# Patient Record
Sex: FEMALE | Race: WHITE | Employment: OTHER | URBAN - NONMETROPOLITAN AREA
[De-identification: names, ages, dates, MRNs, and addresses within clinical notes are randomized per-mention and may not be internally consistent; named-entity substitution may affect disease eponyms.]

---

## 2022-06-09 RX ORDER — LISINOPRIL 20 MG/1
20 TABLET ORAL DAILY
COMMUNITY

## 2022-06-09 RX ORDER — VITAMIN E 268 MG
400 CAPSULE ORAL DAILY
COMMUNITY

## 2022-06-09 RX ORDER — MULTIVIT WITH MINERALS/LUTEIN
1000 TABLET ORAL DAILY
COMMUNITY

## 2022-06-09 RX ORDER — ACETAMINOPHEN 160 MG
TABLET,DISINTEGRATING ORAL DAILY
COMMUNITY

## 2022-06-09 RX ORDER — UREA 10 %
1 LOTION (ML) TOPICAL NIGHTLY PRN
COMMUNITY

## 2022-06-09 RX ORDER — PRASTERONE (DHEA) 50 MG
CAPSULE ORAL EVERY OTHER DAY
COMMUNITY

## 2022-06-09 RX ORDER — DEANOL BITARTRATE 100 %
POWDER (GRAM) MISCELLANEOUS EVERY OTHER DAY
COMMUNITY

## 2022-06-09 RX ORDER — HYDROCHLOROTHIAZIDE 25 MG/1
25 TABLET ORAL DAILY
COMMUNITY

## 2022-06-09 RX ORDER — ALPHA LIPOIC ACID 300 MG
CAPSULE ORAL EVERY OTHER DAY
COMMUNITY

## 2022-06-09 NOTE — PROGRESS NOTES
States she will arrange for someone to be with her after surgery  NPO after midnight  [employer] and drivers license  Wear comfortable clean clothing  Do not bring jewelry  Shower night before and morning of surgery with a liquid antibacterial soap  Bring list of medications with dosage and how often taken  Follow all instructions given by your physician   needed at discharge  Please limit to 2 visitors for surgery  You must have a responsible adult with you day of surgery and for 24 hours after surgery  Please bring and wear mask  Call [phone] for any questions

## 2022-06-09 NOTE — PROGRESS NOTES
In preparation for their surgical procedure above patient was screened for Obstructive Sleep Apnea (GREY) using the STOP-Bang Questionnaire by the Pre-Admission Testing department. This is a pre-surgical screening tool for patient safety and serves as a recommendation, this WILL NOT cause cancellation of surgery. STOP-Bang Questionnaire  * Do you currently see a pulmonologist?  No     If yes STOP, do not complete. Patient follows with Dr.     1.  Do you snore loudly (able to be heard in the next room)? No    2. Do you often feel tired or sleepy during the daytime? No       3. Has anyone ever told you that you stop breathing during your sleep? No    4. Do you have or are you being treated for high blood pressure? Yes      5. BMI more than 35? BMI (Calculated): 26.7        No    6. Age over 48 years? 80 y.o. Yes    7. Neck Circumference greater than 17 inches for male or 16 inches for female? Measured           (visits only)            Not Applicable    8. Gender Male? No      TOTAL SCORE: 2    GREY - Low Risk : Yes to 0 - 2 questions  GREY - Intermediate Risk : Yes to 3 - 4 questions  GREY - High Risk : Yes to 5 - 8 questions    Adapted from:   STOP Questionnaire: A Tool to Screen Patients for Obstructive Sleep Apnea   TRISTAN AquinoC.P.C., Deny Storm M.B.B.S., Leslie Monroy M.D., Matthias Garcia. Katharina Acevedo, Ph.D., MARYANNE Multani.B.B.S., Maryjo Moreno M.Sc., Ignacia Jenkins M.D., Elana Beasley. LAURO Tai.P.C.    Anesthesiology 2008; 659:779-20 Copyright 2008, the [address] of Anesthesi[employer], [facility] 37.   ----------------------------------------------------------------------------------------------------------------

## 2022-06-16 ENCOUNTER — ANESTHESIA (OUTPATIENT)
Dept: OPERATING ROOM | Age: 82
End: 2022-06-16
Payer: MEDICARE

## 2022-06-16 ENCOUNTER — HOSPITAL ENCOUNTER (OUTPATIENT)
Age: 82
Setting detail: OUTPATIENT SURGERY
Discharge: HOME | End: 2022-06-16
Attending: SPECIALIST | Admitting: SPECIALIST
Payer: MEDICARE

## 2022-06-16 ENCOUNTER — ANESTHESIA EVENT (OUTPATIENT)
Dept: OPERATING ROOM | Age: 82
End: 2022-06-16
Payer: MEDICARE

## 2022-06-16 VITALS
BODY MASS INDEX: 25.66 KG/M2 | HEIGHT: 65 IN | SYSTOLIC BLOOD PRESSURE: 115 MMHG | DIASTOLIC BLOOD PRESSURE: 58 MMHG | WEIGHT: 154 LBS | RESPIRATION RATE: 16 BRPM | TEMPERATURE: 96.9 F | HEART RATE: 87 BPM | OXYGEN SATURATION: 95 %

## 2022-06-16 PROCEDURE — 2709999900 HC NON-CHARGEABLE SUPPLY: Performed by: SPECIALIST

## 2022-06-16 PROCEDURE — 2500000003 HC RX 250 WO HCPCS: Performed by: NURSE ANESTHETIST, CERTIFIED REGISTERED

## 2022-06-16 PROCEDURE — 2500000003 HC RX 250 WO HCPCS: Performed by: SPECIALIST

## 2022-06-16 PROCEDURE — 3600000002 HC SURGERY LEVEL 2 BASE: Performed by: SPECIALIST

## 2022-06-16 PROCEDURE — 6360000002 HC RX W HCPCS: Performed by: NURSE ANESTHETIST, CERTIFIED REGISTERED

## 2022-06-16 PROCEDURE — 3700000000 HC ANESTHESIA ATTENDED CARE: Performed by: SPECIALIST

## 2022-06-16 PROCEDURE — 3600000012 HC SURGERY LEVEL 2 ADDTL 15MIN: Performed by: SPECIALIST

## 2022-06-16 PROCEDURE — 7100000010 HC PHASE II RECOVERY - FIRST 15 MIN: Performed by: SPECIALIST

## 2022-06-16 PROCEDURE — 7100000011 HC PHASE II RECOVERY - ADDTL 15 MIN: Performed by: SPECIALIST

## 2022-06-16 PROCEDURE — 3700000001 HC ADD 15 MINUTES (ANESTHESIA): Performed by: SPECIALIST

## 2022-06-16 PROCEDURE — 2580000003 HC RX 258: Performed by: NURSE ANESTHETIST, CERTIFIED REGISTERED

## 2022-06-16 RX ORDER — DEXAMETHASONE SODIUM PHOSPHATE 10 MG/ML
INJECTION, EMULSION INTRAMUSCULAR; INTRAVENOUS PRN
Status: DISCONTINUED | OUTPATIENT
Start: 2022-06-16 | End: 2022-06-16 | Stop reason: SDUPTHER

## 2022-06-16 RX ORDER — LIDOCAINE HYDROCHLORIDE 20 MG/ML
INJECTION, SOLUTION EPIDURAL; INFILTRATION; INTRACAUDAL; PERINEURAL PRN
Status: DISCONTINUED | OUTPATIENT
Start: 2022-06-16 | End: 2022-06-16 | Stop reason: SDUPTHER

## 2022-06-16 RX ORDER — PROPOFOL 10 MG/ML
INJECTION, EMULSION INTRAVENOUS PRN
Status: DISCONTINUED | OUTPATIENT
Start: 2022-06-16 | End: 2022-06-16 | Stop reason: SDUPTHER

## 2022-06-16 RX ORDER — ONDANSETRON 2 MG/ML
INJECTION INTRAMUSCULAR; INTRAVENOUS PRN
Status: DISCONTINUED | OUTPATIENT
Start: 2022-06-16 | End: 2022-06-16 | Stop reason: SDUPTHER

## 2022-06-16 RX ORDER — SODIUM CHLORIDE 9 MG/ML
INJECTION, SOLUTION INTRAVENOUS CONTINUOUS PRN
Status: DISCONTINUED | OUTPATIENT
Start: 2022-06-16 | End: 2022-06-16 | Stop reason: SDUPTHER

## 2022-06-16 RX ORDER — LIDOCAINE HYDROCHLORIDE AND EPINEPHRINE 10; 10 MG/ML; UG/ML
INJECTION, SOLUTION INFILTRATION; PERINEURAL PRN
Status: DISCONTINUED | OUTPATIENT
Start: 2022-06-16 | End: 2022-06-16 | Stop reason: ALTCHOICE

## 2022-06-16 RX ORDER — CEFAZOLIN SODIUM 1 G/3ML
INJECTION, POWDER, FOR SOLUTION INTRAMUSCULAR; INTRAVENOUS PRN
Status: DISCONTINUED | OUTPATIENT
Start: 2022-06-16 | End: 2022-06-16 | Stop reason: SDUPTHER

## 2022-06-16 RX ORDER — FENTANYL CITRATE 50 UG/ML
INJECTION, SOLUTION INTRAMUSCULAR; INTRAVENOUS PRN
Status: DISCONTINUED | OUTPATIENT
Start: 2022-06-16 | End: 2022-06-16 | Stop reason: SDUPTHER

## 2022-06-16 RX ADMIN — PROPOFOL 50 MG: 10 INJECTION, EMULSION INTRAVENOUS at 12:41

## 2022-06-16 RX ADMIN — DEXAMETHASONE SODIUM PHOSPHATE 8 MG: 10 INJECTION, EMULSION INTRAMUSCULAR; INTRAVENOUS at 12:12

## 2022-06-16 RX ADMIN — PROPOFOL 30 MG: 10 INJECTION, EMULSION INTRAVENOUS at 12:25

## 2022-06-16 RX ADMIN — SODIUM CHLORIDE: 9 INJECTION, SOLUTION INTRAVENOUS at 12:08

## 2022-06-16 RX ADMIN — PROPOFOL 30 MG: 10 INJECTION, EMULSION INTRAVENOUS at 12:35

## 2022-06-16 RX ADMIN — CEFAZOLIN 2000 MG: 1 INJECTION, POWDER, FOR SOLUTION INTRAMUSCULAR; INTRAVENOUS at 12:13

## 2022-06-16 RX ADMIN — FENTANYL CITRATE 100 MCG: 50 INJECTION, SOLUTION INTRAMUSCULAR; INTRAVENOUS at 12:10

## 2022-06-16 RX ADMIN — PROPOFOL 30 MG: 10 INJECTION, EMULSION INTRAVENOUS at 12:30

## 2022-06-16 RX ADMIN — PROPOFOL 50 MG: 10 INJECTION, EMULSION INTRAVENOUS at 12:49

## 2022-06-16 RX ADMIN — PROPOFOL 60 MG: 10 INJECTION, EMULSION INTRAVENOUS at 12:53

## 2022-06-16 RX ADMIN — PROPOFOL 50 MG: 10 INJECTION, EMULSION INTRAVENOUS at 12:16

## 2022-06-16 RX ADMIN — LIDOCAINE HYDROCHLORIDE 40 MG: 20 INJECTION, SOLUTION EPIDURAL; INFILTRATION; INTRACAUDAL; PERINEURAL at 12:15

## 2022-06-16 RX ADMIN — PROPOFOL 50 MG: 10 INJECTION, EMULSION INTRAVENOUS at 12:45

## 2022-06-16 RX ADMIN — PROPOFOL 50 MG: 10 INJECTION, EMULSION INTRAVENOUS at 12:58

## 2022-06-16 RX ADMIN — ONDANSETRON 4 MG: 2 INJECTION INTRAMUSCULAR; INTRAVENOUS at 12:21

## 2022-06-16 ASSESSMENT — PAIN - FUNCTIONAL ASSESSMENT: PAIN_FUNCTIONAL_ASSESSMENT: 0-10

## 2022-06-16 NOTE — ANESTHESIA PRE PROCEDURE
Department of Anesthesiology  Preprocedure Note       Name:  Alejandro Call   Age:  80 y.o.  :  1940                                          MRN:  [id number]         Date:  2022      Surgeon: Cornelius Baptiste):  Bailee Torres MD    Procedure: Procedure(s):  MOHS DEFECT REPAIR [facility]    Medications prior to admission:   Prior to Admission medications    Medication Sig Start Date End Date Taking?  Authorizing Provider   lisinopril (PRINIVIL;ZESTRIL) 20 MG tablet Take 20 mg by mouth daily   Yes Historical Provider, MD   hydroCHLOROthiazide (HYDRODIURIL) 25 MG tablet Take 25 mg by mouth daily   Yes Historical Provider, MD   Ascorbic Acid (VITAMIN C) 1000 MG tablet Take 1,000 mg by mouth daily   Yes Historical Provider, MD   vitamin E 400 UNIT capsule Take 400 Units by mouth daily   Yes Historical Provider, MD   Cholecalciferol (VITAMIN D3) 50 MCG ( UT) CAPS Take by mouth daily   Yes Historical Provider, MD   Multiple Vitamin (MULTIVITAMIN PO) Take by mouth daily   Yes Historical Provider, MD   milk thistle 175 MG tablet Take 175 mg by mouth daily   Yes Historical Provider, MD   Misc Natural Products (SARSAPARILLA ROOT PO) Take 450 mg by mouth daily   Yes Historical Provider, MD   Digestive Enzymes (MULTI-ENZYME PO) Take by mouth daily   Yes Historical Provider, MD   Cinnamon 500 MG TABS Take by mouth daily   Yes Historical Provider, MD   Turmeric 450 MG CAPS Take by mouth daily   Yes Historical Provider, MD   Multiple Vitamins-Minerals (LUTEIN-ZEAXANTHIN PO) Take by mouth daily Lutein 10 mg Zeaxanthin 4 mg   Yes Historical Provider, MD   Omega-3 Fatty Acids (OMEGA 3 PO) Take 1,360 mg by mouth 2 times daily   Yes Historical Provider, MD   CALCIUM-MAGNESIUM PO Take by mouth 2 times daily   Yes Historical Provider, MD   ECHINACEA PO Take 1,200 mg by mouth once a week   Yes Historical Provider, MD   melatonin 1 MG tablet Take 1 mg by mouth nightly as needed for Sleep   Yes Historical Provider, MD Carnitine-B5-B6 (L-CARNITINE 500 PO) Take by mouth every other day   Yes Historical Provider, MD   L-ARGININE-500 PO Take by mouth every other day   Yes Historical Provider, MD   Linoleic Acid Conjugated 1000 MG CAPS Take by mouth every other day   Yes Historical Provider, MD   Deanol Bitartrate (DMAE BITARTRATE) POWD Take by mouth every other day   Yes Historical Provider, MD   Alpha-Lipoic Acid 300 MG CAPS Take by mouth every other day   Yes Historical Provider, MD   Lycopene 5 MG CAPS Take by mouth every other day   Yes Historical Provider, MD   Ginkgo Biloba (GINKOBA PO) Take 60 mg by mouth every other day   Yes Historical Provider, MD   Liliya 500 MG CAPS Take by mouth every other day   Yes Historical Provider, MD       Current medications:    No current facility-administered medications for this encounter. Allergies:  No Known Allergies    Problem List:  There is no problem list on file for this patient.       Past Medical History:        Diagnosis Date    Cancer ([facility] [address]     skin    Hypertension        Past Surgical History:        Procedure Laterality Date    APPENDECTOMY      CYST REMOVAL      hip    SKIN CANCER EXCISION         Social History:    Social History     Tobacco Use    Smoking status: Current Every Day Smoker     Packs/day: 0.50     Types: Cigarettes    Smokeless tobacco: Never Used   Substance Use Topics    Alcohol use: Yes     Comment: occasional 1-2 drinks                                Ready to quit: Not Answered  Counseling given: Not Answered      Vital Signs (Current):   Vitals:    06/09/22 1049 06/16/22 1137   BP:  136/60   Pulse:  98   Resp:  16   Temp:  97.4 °F (36.3 °C)   TempSrc:  Temporal   SpO2:  94%   Weight: 155 lb (70.3 kg) 154 lb (69.9 kg)   Height: 5' 4\" (1.626 m) 5' 5\" (1.651 m)                                              BP Readings from Last 3 Encounters:   06/16/22 136/60       NPO Status: Time of last liquid consumption: 0000                        Time of last solid consumption: 2200                        Date of last liquid consumption: 06/16/22                        Date of last solid food consumption: 06/15/22    BMI:   Wt Readings from Last 3 Encounters:   06/16/22 154 lb (69.9 kg)     Body mass index is 25.63 kg/m². CBC: No results found for: WBC, RBC, HGB, HCT, MCV, RDW, PLT    CMP: No results found for: NA, K, CL, CO2, BUN, CREATININE, GFRAA, AGRATIO, LABGLOM, GLUCOSE, GLU, PROT, CALCIUM, BILITOT, ALKPHOS, AST, ALT    POC Tests: No results for input(s): POCGLU, POCNA, POCK, POCCL, POCBUN, POCHEMO, POCHCT in the last 72 hours. Coags: No results found for: PROTIME, INR, APTT    HCG (If Applicable): No results found for: PREGTESTUR, PREGSERUM, HCG, HCGQUANT     ABGs: No results found for: PHART, PO2ART, WOO6FVP, NEZ3NPA, BEART, Q2QJNJTY     Type & Screen (If Applicable):  No results found for: LABABO, LABRH    Drug/Infectious Status (If Applicable):  No results found for: HIV, HEPCAB    COVID-19 Screening (If Applicable): No results found for: COVID19        Anesthesia Evaluation    Airway: Mallampati: II          Dental:          Pulmonary:                              Cardiovascular:    (+) hypertension:,                   Neuro/Psych:               GI/Hepatic/Renal:             Endo/Other:                     Abdominal:             Vascular: Other Findings:           Anesthesia Plan      MAC     ASA 4             Anesthetic plan and risks discussed with patient.                         Calixto Hammonds MD   6/16/2022

## 2022-06-16 NOTE — PROGRESS NOTES
1302 To recovery via chair. Spont resp. VSS. Report received from surgical rn. IV infusing Rosario Coronel. Incision on upper right lip area clean and intact; open to air. Pt denies pain or nausea. Snack and drink given. Call bell in reach. Family member in room  [phone] Discharge instructions given to pt and cousin with each voicing understanding. IV discontinued. Up to dress self  [phone] to go directly to Dr Julia Mayen office to  pain medication prescription.    2847 Discharge to home in stable ambulatory condition with cousin

## 2022-06-16 NOTE — PROGRESS NOTES
Pt. Admitted in stable condition. Consent signed. VS obtained and WNL. INT inserted without complications. Pt. Denies all other needs. Warm blanket provided. Call light left within reach.

## 2022-06-16 NOTE — DISCHARGE INSTRUCTIONS
POST OPERATIVE INSTRUCTION SHEET  SKIN TUMOR/LESION REMOVAL          Activity:        COLD COMPRESSES. 20 minutes on at a time. Do not apply ice directly to the skin  · No strenuous activity for 48 hours  · No activity that stresses the suture closure/incision  · Regular diet; Unless operation of lip- then clear liquids for 48 hours (Sip from a cup: do not use a straw)  · ABSOLUTELY NO NICOTINE OF ANY TYPE    Wound Care:  · If the incision is open to air, you should gently wash with soap and water using fingertips   · If Dermabond used, you may shower 24 hours post operation, but do not scrub, rub or pick at adhesive glue  · Keep all incisions clean. Limitations:  · No swimming, hot tub, sauna or soaking in a bathtub    Prescriptions:  · Take exactly as prescribed    Follow-Up:  Call office for an appointment     Notify our office if you experience any of the following:   Develop a fever (temperature is greater than 100.5F)   Develop redness greater than 1 cm around incision or red streaks up         extremity   Have any excess bleeding/ increased drainage or swelling at the             incision site    *Note:  Your pathology results will be reviewed with you at your scheduled follow-up appointment.

## 2022-06-17 NOTE — H&P
[facility]  History and Physical Update    Pt Name: Gurdeep Maurer  MRN: [id number]  YOB: 1940  Date of evaluation: 6/17/2022    I have examined the patient and reviewed the H&P/Consult and there are no changes to the patient or plans.       Jhonatan Moreno MD  Electronically signed 6/17/2022 at 6:59 AM

## 2022-06-17 NOTE — OP NOTE
Operative Note    Patient name: Gurdeep Maurer             Medical Record Number: [id number]    Primary Care Physician: Natasha Colon     1940    Date of Procedure: 2022    Pre-operative Diagnosis: 3cm2 complex defect of right upper lip s/p MOHS for basal cell carcinoma    Post-operative Diagnosis: Same    Procedure Performed: Repair of 3cm2 complex defect of right upper lip with Cheiloplasty (complex/greater than 1/2 vertical height) (CPT 74314)    Surgeons/Assistants: MD Sameera Fonseca DPMARYANNE    Estimated Blood Loss: 7ml     Complications: none immediately appreciated    Procedure: With the patient lying in the supine position and under adequate anesthesia per the anesthesia team, the area was anesthetized with a total of 7 ml of 1% Lidocaine 1:100,000 with epinephrine solution. The area was then prepped and draped in the standard surgical fashion. The complex 3cm2 defect that involves the right upper lips skin, wet and dry mucosa. This could not be closed primarily, therefore a full thickness resection was performed of the entire height of the lip. The labial artery branches were ligated. The full thickness complex cheiloplasty repair was performed with 4-0 Monocryl suture placed in interrupted buried fashion. The intraoral sutures were 4-0 chromic with final closure was completed using 5-0 fast absorbing & Histoacryl. The patient tolerated the procedure quite well and remained hemodynamically stable throughout the procedure and was quite comfortable throughout the operative course.     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]    Post-Op Diagnosis: Same       Procedure(s):  MOHS DEFECT REPAIR BCC RIGHT VERMILLION    Surgeon(s):  Teresa Ponce MD    Assistant:   * No surgical staff found *    Anesthesia: Monitor Anesthesia Care    Estimated Blood Loss (mL): Minimal    Complications: None    Specimens:   * No specimens in log *    Implants:  * No implants in log *      Drains: * No LDAs found *    Findings: 3cm2 complex defect of right upper lip s/p MOHS for basal cell carcinoma    Detailed Description of Procedure:   Repair of 3cm2 complex defect of right upper lip with Cheiloplasty (complex/greater than 1/2 vertical height) (CPT 30479)    Electronically signed by Teresa Ponce MD on 6/17/2022 at 11:04 AM

## 2022-06-18 NOTE — ANESTHESIA POSTPROCEDURE EVALUATION
Department of Anesthesiology  Postprocedure Note    Patient: Stella Koehler  MRN: [id number]  YOB: 1940  Date of evaluation: 6/18/2022  Time:  12:35 AM     Procedure Summary     Date: 06/16/22 Room / Location: [address] [place] [address] 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 Nazanin Phase I: Nazanin Score: 10    Nazanin Phase II: Nazanin Score: 10    Last vitals: Reviewed and per EMR flowsheets.        Anesthesia Post Evaluation    Complications: no

## 2022-06-20 NOTE — H&P
[address] [address]  History and Physical Update    Pt Name: Florence Hays  MRN: [id number]  YOB: 1940  Date of evaluation: 6/20/2022    I have examined the patient and reviewed the H&P/Consult and there are no changes to the patient or plans.       Aguilar Oscar MD  Electronically signed 6/20/2022 at 6:54 AM

## 2025-03-11 ENCOUNTER — TELEPHONE (OUTPATIENT)
Dept: CARDIOLOGY CLINIC | Age: 85
End: 2025-03-11

## 2025-03-11 NOTE — TELEPHONE ENCOUNTER
Received NP appt request from Dr. Way's office for TAVR workup.   She is currently admitted to [facility] and stated she would like to be discharged and finish her follow up appts and then will CB to make appt with Dr. Rodriguez.   Referral packet attached to encounter.

## 2025-03-14 NOTE — TELEPHONE ENCOUNTER
Spoke to patient, appointment scheduled.   Called Dr Michael's office, requesting full echo report.

## 2025-04-03 ENCOUNTER — TELEPHONE (OUTPATIENT)
Dept: CARDIOLOGY CLINIC | Age: 85
End: 2025-04-03

## 2025-04-03 NOTE — TELEPHONE ENCOUNTER
Patient see's Dr Rodriguez on: 4/10/25    Referring Provider: Mark Waterman MD    Primary Cardiologist: Mark Waterman MD    WSDF send to:     History: CAD, HTN, CHF, severe AS, GIB, Bilateral carotid artery stenosis, COPD, anemia, dysphagia, hypercholesteremia    Cath: *needs performed*    Echo:      Labs: 3/11/25        OAC vs antiplatelet inhibitor:    Do you have trouble swallowing?    Any issues with anesthesia or sedation?    Marlene Ray is being referred for Left Atrial Appendage Closure with WATCHMAN device for management of stroke risk resulting from non-valvular atrial fibrillation. Based on their past history, it has been determined that they are poor candidates for long-term oral-anticoagulation, however may be tolerant of short term treatment with anti-thrombotic therapy as necessary.      Patient is high risk for stroke or systemic thromboembolism. Patient [id number]  is calculated:      Risk   Factors  Component Value   C CHF Yes 1   H HTN Yes 1   A2 Age >= 75 Yes,  (85 y.o.) 2   D DM No 0   S2 Prior Stroke/TIA No 0   V Vascular Disease Yes 1   A Age 65-74 No,  (85 y.o.) 0   Sc Sex female 1    [id number]  Score  6     Vascular: CAD    Specifically regarding risk of anticoagulation they have demonstrated:  History of bleeding (eg. Intracerebral, subdural, GI, retro-peritoneal)  Intolerance oral anticoagulation  Increased bleeding risk (e.g. Thrombocytopenia, anemia)  High risk of recurrent falls  Occupation related high bleeding risk  Need for prolonged dual anti platelet therapy  Severe renal failure  Poor compliance with anticoagulant therapy

## 2025-04-10 ENCOUNTER — TELEPHONE (OUTPATIENT)
Dept: CARDIOLOGY CLINIC | Age: 85
End: 2025-04-10

## 2025-04-10 ENCOUNTER — OFFICE VISIT (OUTPATIENT)
Dept: CARDIOLOGY CLINIC | Age: 85
End: 2025-04-10
Payer: MEDICARE

## 2025-04-10 VITALS
HEIGHT: 64 IN | HEART RATE: 65 BPM | BODY MASS INDEX: 22.2 KG/M2 | SYSTOLIC BLOOD PRESSURE: 144 MMHG | DIASTOLIC BLOOD PRESSURE: 72 MMHG | WEIGHT: 130 LBS

## 2025-04-10 DIAGNOSIS — I25.10 CORONARY ARTERY DISEASE INVOLVING NATIVE CORONARY ARTERY OF NATIVE HEART, UNSPECIFIED WHETHER ANGINA PRESENT: ICD-10-CM

## 2025-04-10 DIAGNOSIS — I65.23 BILATERAL CAROTID ARTERY STENOSIS: ICD-10-CM

## 2025-04-10 DIAGNOSIS — I35.0 SEVERE AORTIC STENOSIS: Primary | ICD-10-CM

## 2025-04-10 DIAGNOSIS — I48.0 PAROXYSMAL ATRIAL FIBRILLATION (HCC): ICD-10-CM

## 2025-04-10 PROCEDURE — 99205 OFFICE O/P NEW HI 60 MIN: CPT | Performed by: INTERNAL MEDICINE

## 2025-04-10 PROCEDURE — 1159F MED LIST DOCD IN RCRD: CPT | Performed by: INTERNAL MEDICINE

## 2025-04-10 PROCEDURE — 1123F ACP DISCUSS/DSCN MKR DOCD: CPT | Performed by: INTERNAL MEDICINE

## 2025-04-10 RX ORDER — ASPIRIN 81 MG/1
81 TABLET, CHEWABLE ORAL DAILY
COMMUNITY
Start: 2025-03-06

## 2025-04-10 RX ORDER — LACTOBACILLUS ACIDOPHILUS 500MM CELL
1 CAPSULE ORAL DAILY
COMMUNITY
Start: 2025-03-11 | End: 2025-03-25

## 2025-04-10 RX ORDER — MAGNESIUM GLYCINATE 100 MG
2 CAPSULE ORAL 2 TIMES DAILY
COMMUNITY
Start: 2025-03-13

## 2025-04-10 RX ORDER — METOPROLOL SUCCINATE 50 MG/1
50 TABLET, EXTENDED RELEASE ORAL DAILY
COMMUNITY
Start: 2025-03-06 | End: 2025-04-11

## 2025-04-10 RX ORDER — POTASSIUM CITRATE 1080 MG/1
TABLET, EXTENDED RELEASE ORAL
COMMUNITY

## 2025-04-10 RX ORDER — AMLODIPINE BESYLATE 2.5 MG/1
2.5 TABLET ORAL DAILY
COMMUNITY

## 2025-04-10 RX ORDER — FERROUS SULFATE 325(65) MG
325 TABLET ORAL DAILY
COMMUNITY
Start: 2025-03-27

## 2025-04-10 RX ORDER — FUROSEMIDE 40 MG/1
40 TABLET ORAL DAILY
COMMUNITY

## 2025-04-10 RX ORDER — NICOTINE 21 MG/24HR
1 PATCH, TRANSDERMAL 24 HOURS TRANSDERMAL DAILY
Qty: 42 PATCH | Refills: 0 | Status: SHIPPED | OUTPATIENT
Start: 2025-04-10 | End: 2025-05-22

## 2025-04-10 RX ORDER — ATORVASTATIN CALCIUM 80 MG/1
80 TABLET, FILM COATED ORAL EVERY EVENING
COMMUNITY
Start: 2025-03-06

## 2025-04-10 NOTE — PROGRESS NOTES
and Watchman procedures with the patient.       Discussed symptoms needing emergency care or those which require further medical attention.   Discussed diet/exercise/BP/weight loss/health lifestyle choices/lipids; the patient understands the goals and will try to comply.        Disposition:  TAVR then WATCHMAN             The patient (or guardian, if applicable) and other individuals in attendance with the patient were advised that Artificial Intelligence will be utilized during this visit to record, process the conversation to generate a clinical note, and support improvement of the AI technology. The patient (or guardian, if applicable) and other individuals in attendance at the appointment consented to the use of AI, including the recording.      Electronically signed by Raffi Rodriguez MD   4/10/2025 at 8:12 AM EDT

## 2025-04-10 NOTE — TELEPHONE ENCOUNTER
Orders received from Dr. Rodriguez for CBC and BMP.    Orders received from Dr. Rodriguez to obtain a CV/CT Surgery appointment, CTAs of the neck, chest and abdomen and pelvis with TAVr protocol, Cardiac Catheterization, Dental Clearance and CHF appointment for optimization.    CT/CV Surgery appointment scheduled on 4/15/25  CTA scheduled on ___ at ___.   Cardiac Catheterization scheduled with Dr. Rodriguez on ___ at ___ with an arrival time of ___.    Patient has had cath back in 2022.  Per Dr. Rodriguez image review shows multivessel CAD, pt will likely need staged PCI.  Will schedule R/L HC as first case on Monday interventional week.  Will schedule when cleared by dentist.      CHF appointment scheduled on ___ at ___  Dental Appointment scheduled on 4/15 with Dr[facility] Lu at [facility].  Dental clearance has been faxed to his office.     PreTAVR [id number] completed and scanned into chart.     Labs pending.    Dr. Rodriguez, please agree.     [place], can you please schedule CTAs?

## 2025-04-10 NOTE — PATIENT INSTRUCTIONS
Your staff today were Sharona  Your provider today was Dr. Rodriguez  Phone number: [phone]      You may receive a survey regarding the care you received during your visit.  Your input is valuable to us.  We encourage you to complete and return your survey.  We hope you will choose us in the future for your healthcare needs.

## 2025-04-15 ENCOUNTER — OFFICE VISIT (OUTPATIENT)
Dept: CARDIOTHORACIC SURGERY | Age: 85
End: 2025-04-15
Payer: MEDICARE

## 2025-04-15 VITALS
HEIGHT: 64 IN | HEART RATE: 68 BPM | BODY MASS INDEX: 22.02 KG/M2 | SYSTOLIC BLOOD PRESSURE: 133 MMHG | DIASTOLIC BLOOD PRESSURE: 69 MMHG | WEIGHT: 129 LBS | OXYGEN SATURATION: 98 %

## 2025-04-15 DIAGNOSIS — I35.0 AORTIC VALVE STENOSIS, ETIOLOGY OF CARDIAC VALVE DISEASE UNSPECIFIED: Primary | ICD-10-CM

## 2025-04-15 PROCEDURE — 1159F MED LIST DOCD IN RCRD: CPT | Performed by: PHYSICIAN ASSISTANT

## 2025-04-15 PROCEDURE — 1123F ACP DISCUSS/DSCN MKR DOCD: CPT | Performed by: PHYSICIAN ASSISTANT

## 2025-04-15 PROCEDURE — 99205 OFFICE O/P NEW HI 60 MIN: CPT | Performed by: PHYSICIAN ASSISTANT

## 2025-04-15 PROCEDURE — 1160F RVW MEDS BY RX/DR IN RCRD: CPT | Performed by: PHYSICIAN ASSISTANT

## 2025-04-15 NOTE — TELEPHONE ENCOUNTER
Dental clearance obtained and scanned into chart.     [place], can you please schedule R/L HC with Dr. Rodriguez on 5/12?  Scheduled already blocked.    Pt will need a CHF appt to follow scheduled as well.

## 2025-04-15 NOTE — PROGRESS NOTES
NEW PATIENT OFFICE VISIT CT/CV SURGERY    Patient's Name/Date of Birth: Marlene Ray / 1940 (85 y.o.)    PCP: Emanuel Michael DO    Date: April 15, 2025     CC:     Chief Complaint   Patient presents with    Consultation     TAVR Eval     HPI:    Ms. Ray is a 85 year old with hx of severe AS who presents to office with complaints of fatigue and WHITNEY with mild exertion for the past several months with increased progression over the past few weeks.  She recently saw Dr. Rodriguez for her leaky valve and was recommended for TAVR. She has recently experienced extreme fatigue and one instance of syncope. She had a hospitalization in 3/25 for fluid overload and swelling in legs and hands. She has a history of cigarette use, smoking 4 cigarettes daily. She's also had prior hospitalization for GI bleed.     Vital Signs: /69   Pulse 68   Ht 1.626 m (5' 4\")   Wt 58.5 kg (129 lb)   SpO2 98%   BMI 22.14 kg/m²    No data recorded.    Labs:   Labs: 3/11/25         Imaging:  [facility] 9/13/22:  - multivessel CAD  = LMCA mid-segment 50% stenosis  = LAD ostial 50% stenosis, mid-segment 70%, small caliber distally  = D1 ostial 90% stenosis   = LCX proximal 75% stenosis  = RCA ostial 80-90% stenosis  - moderate aortic valve stenosis    Echo:          Expand All Collapse All          [facility]  [facility]  [address] [place] [address]  Dept: [phone]  Dept Fax: [phone]  Loc: [phone]     Visit Date: 4/10/2025     Ms. Ray is a 85 y.o. female  who presented for:      Chief Complaint   Patient presents with    New Patient    Valvular Heart Disease         HPI:      History of Present Illness  The patient is an 85-year-old female who presents for a leaky valve. She is accompanied by her daughter.     A diseased valve has been diagnosed and monitored for several years. Recently, extreme fatigue and one instance of syncope were experienced.

## 2025-04-15 NOTE — TELEPHONE ENCOUNTER
L/R Heart Cath scheduled 5/12/2025 at 9:00 am.  Arrival time of 7:00 am.  Spoke to Rema.  Placed on provider's schedule.  Order faxed.

## 2025-04-15 NOTE — PATIENT INSTRUCTIONS
You may receive a survey regarding the care you received during your visit.  Your input is valuable to us.  We encourage you to complete and return your survey.  We hope you will choose us in the future for your healthcare needs.    Thank you for choosing [facility]!    Your Medical Assistant Today:  Lety JOINER   Your Provider for Today: Edward Cline PA-C   Your Structural Heart RN: Lala BELLO  Ph. [phone]    Have a Great Day!  Happy Easter!

## 2025-04-15 NOTE — TELEPHONE ENCOUNTER
Patient called back.  Reviewed date/time/instructions.  Advised her I will mail a copy of instructions to her and email a copy to her daughter.

## 2025-04-16 PROBLEM — I35.0 SEVERE AORTIC STENOSIS: Status: ACTIVE | Noted: 2025-04-10

## 2025-04-28 ENCOUNTER — HOSPITAL ENCOUNTER (OUTPATIENT)
Dept: CT IMAGING | Age: 85
Discharge: HOME | End: 2025-04-28
Payer: MEDICARE

## 2025-04-28 DIAGNOSIS — I35.0 SEVERE AORTIC STENOSIS: ICD-10-CM

## 2025-04-28 PROCEDURE — 70498 CT ANGIOGRAPHY NECK: CPT

## 2025-04-28 PROCEDURE — 74174 CTA ABD&PLVS W/CONTRAST: CPT

## 2025-04-28 PROCEDURE — 71275 CT ANGIOGRAPHY CHEST: CPT

## 2025-04-28 PROCEDURE — 6360000004 HC RX CONTRAST MEDICATION: Performed by: INTERNAL MEDICINE

## 2025-04-28 RX ORDER — IOPAMIDOL 755 MG/ML
125 INJECTION, SOLUTION INTRAVASCULAR
Status: COMPLETED | OUTPATIENT
Start: 2025-04-28 | End: 2025-04-28

## 2025-04-28 RX ADMIN — IOPAMIDOL 125 ML: 755 INJECTION, SOLUTION INTRAVENOUS at 12:55

## 2025-04-29 ENCOUNTER — TELEPHONE (OUTPATIENT)
Dept: CARDIOLOGY CLINIC | Age: 85
End: 2025-04-29

## 2025-05-05 NOTE — TELEPHONE ENCOUNTER
Patient has been seen by [facility] in the past.  Medical records requested 4/29 and received today.  Spoke with patient, she has not been seen by [facility] since 2022, last carotid US 2018.  She is agreeable to referral to [facility] for evaluation of bilateral carotid artery stenosis prior to TAVR.     Referral placed and faxed to [facility].

## 2025-05-09 ENCOUNTER — PREP FOR PROCEDURE (OUTPATIENT)
Dept: CARDIOLOGY | Age: 85
End: 2025-05-09

## 2025-05-09 RX ORDER — SODIUM CHLORIDE 9 MG/ML
INJECTION, SOLUTION INTRAVENOUS CONTINUOUS
Status: CANCELLED | OUTPATIENT
Start: 2025-05-09

## 2025-05-09 RX ORDER — ASPIRIN 325 MG
325 TABLET ORAL ONCE
Status: CANCELLED | OUTPATIENT
Start: 2025-05-09 | End: 2025-05-09

## 2025-05-09 RX ORDER — DIPHENHYDRAMINE HYDROCHLORIDE 50 MG/ML
50 INJECTION, SOLUTION INTRAMUSCULAR; INTRAVENOUS ONCE
Status: CANCELLED | OUTPATIENT
Start: 2025-05-09 | End: 2025-05-09

## 2025-05-09 RX ORDER — HYDROCORTISONE SODIUM SUCCINATE 100 MG/2ML
200 INJECTION INTRAMUSCULAR; INTRAVENOUS ONCE
Status: CANCELLED | OUTPATIENT
Start: 2025-05-09 | End: 2025-05-09

## 2025-05-09 RX ORDER — SODIUM CHLORIDE 0.9 % (FLUSH) 0.9 %
5-40 SYRINGE (ML) INJECTION PRN
Status: CANCELLED | OUTPATIENT
Start: 2025-05-09

## 2025-05-09 RX ORDER — SODIUM CHLORIDE 0.9 % (FLUSH) 0.9 %
5-40 SYRINGE (ML) INJECTION EVERY 12 HOURS SCHEDULED
Status: CANCELLED | OUTPATIENT
Start: 2025-05-09

## 2025-05-09 RX ORDER — NITROGLYCERIN 0.4 MG/1
0.4 TABLET SUBLINGUAL EVERY 5 MIN PRN
Status: CANCELLED | OUTPATIENT
Start: 2025-05-09

## 2025-05-12 ENCOUNTER — HOSPITAL ENCOUNTER (OUTPATIENT)
Age: 85
Discharge: HOME | End: 2025-05-13
Attending: INTERNAL MEDICINE | Admitting: INTERNAL MEDICINE
Payer: MEDICARE

## 2025-05-12 ENCOUNTER — TELEPHONE (OUTPATIENT)
Dept: CARDIOLOGY CLINIC | Age: 85
End: 2025-05-12

## 2025-05-12 DIAGNOSIS — I35.0 SEVERE AORTIC STENOSIS: ICD-10-CM

## 2025-05-12 PROBLEM — Z95.820 STATUS POST ANGIOPLASTY WITH STENT: Status: ACTIVE | Noted: 2025-05-12

## 2025-05-12 LAB
ABO GROUP BLD: NORMAL
ACTIVATED CLOTTING TIME: 233 SECONDS (ref 1–150)
ANION GAP SERPL CALC-SCNC: 12 MEQ/L (ref 8–16)
APTT PPP: 31.5 SECONDS (ref 22–38)
BDY SITE: ABNORMAL
BUN SERPL-MCNC: 22 MG/DL (ref 8–23)
CALCIUM SERPL-MCNC: 9.4 MG/DL (ref 8.8–10.2)
CHLORIDE SERPL-SCNC: 102 MEQ/L (ref 98–111)
CO2 SERPL-SCNC: 24 MEQ/L (ref 22–29)
COLLECTED BY:: ABNORMAL
CREAT SERPL-MCNC: 1 MG/DL (ref 0.5–0.9)
DEPRECATED RDW RBC AUTO: 59.5 FL (ref 35–45)
ECHO BSA: 1.63 M2
EKG ATRIAL RATE: 60 BPM
EKG ATRIAL RATE: 68 BPM
EKG P AXIS: 54 DEGREES
EKG P AXIS: 73 DEGREES
EKG P-R INTERVAL: 166 MS
EKG P-R INTERVAL: 184 MS
EKG Q-T INTERVAL: 392 MS
EKG Q-T INTERVAL: 410 MS
EKG QRS DURATION: 76 MS
EKG QRS DURATION: 86 MS
EKG QTC CALCULATION (BAZETT): 410 MS
EKG QTC CALCULATION (BAZETT): 416 MS
EKG R AXIS: -11 DEGREES
EKG R AXIS: -9 DEGREES
EKG T AXIS: 47 DEGREES
EKG T AXIS: 57 DEGREES
EKG VENTRICULAR RATE: 60 BPM
EKG VENTRICULAR RATE: 68 BPM
ERYTHROCYTE [DISTWIDTH] IN BLOOD BY AUTOMATED COUNT: 17.2 % (ref 11.5–14.5)
GFR SERPL CREATININE-BSD FRML MDRD: 55 ML/MIN/1.73M2
GLUCOSE SERPL-MCNC: 115 MG/DL (ref 74–109)
HCT VFR BLD AUTO: 37.9 % (ref 37–47)
HGB BLD-MCNC: 12.4 GM/DL (ref 12–16)
IAT IGG-SP REAG SERPL QL: NORMAL
INR PPP: 0.95 (ref 0.85–1.13)
MCH RBC QN AUTO: 30.7 PG (ref 26–33)
MCHC RBC AUTO-ENTMCNC: 32.7 GM/DL (ref 32.2–35.5)
MCV RBC AUTO: 93.8 FL (ref 81–99)
PLATELET # BLD AUTO: 253 THOU/MM3 (ref 130–400)
PMV BLD AUTO: 9.9 FL (ref 9.4–12.4)
POTASSIUM SERPL-SCNC: 4.1 MEQ/L (ref 3.5–5.2)
RBC # BLD AUTO: 4.04 MILL/MM3 (ref 4.2–5.4)
RH BLD: NORMAL
SAO2 % BLD: 68 % (ref 94–97)
SAO2 % BLD: 91 % (ref 94–97)
SAO2 % BLD: 92 % (ref 94–97)
SODIUM SERPL-SCNC: 138 MEQ/L (ref 135–145)
WBC # BLD AUTO: 7.8 THOU/MM3 (ref 4.8–10.8)

## 2025-05-12 PROCEDURE — C1874 STENT, COATED/COV W/DEL SYS: HCPCS | Performed by: INTERNAL MEDICINE

## 2025-05-12 PROCEDURE — C1760 CLOSURE DEV, VASC: HCPCS | Performed by: INTERNAL MEDICINE

## 2025-05-12 PROCEDURE — 93005 ELECTROCARDIOGRAM TRACING: CPT | Performed by: INTERNAL MEDICINE

## 2025-05-12 PROCEDURE — 82810 BLOOD GASES O2 SAT ONLY: CPT

## 2025-05-12 PROCEDURE — 92972 PERQ TRLUML CORONRY LITHOTRP: CPT | Performed by: INTERNAL MEDICINE

## 2025-05-12 PROCEDURE — 85610 PROTHROMBIN TIME: CPT

## 2025-05-12 PROCEDURE — 93454 CORONARY ARTERY ANGIO S&I: CPT | Performed by: INTERNAL MEDICINE

## 2025-05-12 PROCEDURE — 6360000004 HC RX CONTRAST MEDICATION: Performed by: INTERNAL MEDICINE

## 2025-05-12 PROCEDURE — 80048 BASIC METABOLIC PNL TOTAL CA: CPT

## 2025-05-12 PROCEDURE — 99152 MOD SED SAME PHYS/QHP 5/>YRS: CPT | Performed by: INTERNAL MEDICINE

## 2025-05-12 PROCEDURE — 99153 MOD SED SAME PHYS/QHP EA: CPT | Performed by: INTERNAL MEDICINE

## 2025-05-12 PROCEDURE — C9600 PERC DRUG-EL COR STENT SING: HCPCS | Performed by: INTERNAL MEDICINE

## 2025-05-12 PROCEDURE — 36415 COLL VENOUS BLD VENIPUNCTURE: CPT

## 2025-05-12 PROCEDURE — 6360000002 HC RX W HCPCS: Performed by: INTERNAL MEDICINE

## 2025-05-12 PROCEDURE — 2580000003 HC RX 258: Performed by: STUDENT IN AN ORGANIZED HEALTH CARE EDUCATION/TRAINING PROGRAM

## 2025-05-12 PROCEDURE — 7100000010 HC PHASE II RECOVERY - FIRST 15 MIN: Performed by: INTERNAL MEDICINE

## 2025-05-12 PROCEDURE — 6370000000 HC RX 637 (ALT 250 FOR IP): Performed by: INTERNAL MEDICINE

## 2025-05-12 PROCEDURE — C1894 INTRO/SHEATH, NON-LASER: HCPCS | Performed by: INTERNAL MEDICINE

## 2025-05-12 PROCEDURE — C1761 HC CATH TRANSLUM INTRAVASCULAR LITHOTRIPSY CORONARY: HCPCS | Performed by: INTERNAL MEDICINE

## 2025-05-12 PROCEDURE — 7100000011 HC PHASE II RECOVERY - ADDTL 15 MIN: Performed by: INTERNAL MEDICINE

## 2025-05-12 PROCEDURE — C1725 CATH, TRANSLUMIN NON-LASER: HCPCS | Performed by: INTERNAL MEDICINE

## 2025-05-12 PROCEDURE — 93010 ELECTROCARDIOGRAM REPORT: CPT | Performed by: INTERNAL MEDICINE

## 2025-05-12 PROCEDURE — 85347 COAGULATION TIME ACTIVATED: CPT

## 2025-05-12 PROCEDURE — 92928 PRQ TCAT PLMT NTRAC ST 1 LES: CPT | Performed by: INTERNAL MEDICINE

## 2025-05-12 PROCEDURE — G0269 OCCLUSIVE DEVICE IN VEIN ART: HCPCS | Performed by: INTERNAL MEDICINE

## 2025-05-12 PROCEDURE — 86901 BLOOD TYPING SEROLOGIC RH(D): CPT

## 2025-05-12 PROCEDURE — 2709999900 HC NON-CHARGEABLE SUPPLY: Performed by: INTERNAL MEDICINE

## 2025-05-12 PROCEDURE — 86900 BLOOD TYPING SEROLOGIC ABO: CPT

## 2025-05-12 PROCEDURE — C1887 CATHETER, GUIDING: HCPCS | Performed by: INTERNAL MEDICINE

## 2025-05-12 PROCEDURE — 2500000003 HC RX 250 WO HCPCS: Performed by: INTERNAL MEDICINE

## 2025-05-12 PROCEDURE — 85730 THROMBOPLASTIN TIME PARTIAL: CPT

## 2025-05-12 PROCEDURE — 85027 COMPLETE CBC AUTOMATED: CPT

## 2025-05-12 PROCEDURE — C1769 GUIDE WIRE: HCPCS | Performed by: INTERNAL MEDICINE

## 2025-05-12 PROCEDURE — 93005 ELECTROCARDIOGRAM TRACING: CPT | Performed by: STUDENT IN AN ORGANIZED HEALTH CARE EDUCATION/TRAINING PROGRAM

## 2025-05-12 PROCEDURE — 93456 R HRT CORONARY ARTERY ANGIO: CPT | Performed by: INTERNAL MEDICINE

## 2025-05-12 PROCEDURE — 86885 COOMBS TEST INDIRECT QUAL: CPT

## 2025-05-12 DEVICE — STENT ONYXNG35018UX ONYX 3.50X18RX
Type: IMPLANTABLE DEVICE | Status: FUNCTIONAL
Brand: ONYX FRONTIER™

## 2025-05-12 RX ORDER — ATORVASTATIN CALCIUM 80 MG/1
80 TABLET, FILM COATED ORAL NIGHTLY
Status: DISCONTINUED | OUTPATIENT
Start: 2025-05-12 | End: 2025-05-13 | Stop reason: HOSPADM

## 2025-05-12 RX ORDER — SODIUM CHLORIDE 0.9 % (FLUSH) 0.9 %
5-40 SYRINGE (ML) INJECTION EVERY 12 HOURS SCHEDULED
Status: DISCONTINUED | OUTPATIENT
Start: 2025-05-12 | End: 2025-05-13 | Stop reason: HOSPADM

## 2025-05-12 RX ORDER — AMLODIPINE BESYLATE 2.5 MG/1
2.5 TABLET ORAL DAILY
Status: DISCONTINUED | OUTPATIENT
Start: 2025-05-12 | End: 2025-05-13 | Stop reason: HOSPADM

## 2025-05-12 RX ORDER — ASPIRIN 81 MG/1
81 TABLET, CHEWABLE ORAL DAILY
Status: DISCONTINUED | OUTPATIENT
Start: 2025-05-13 | End: 2025-05-13 | Stop reason: HOSPADM

## 2025-05-12 RX ORDER — CLOPIDOGREL BISULFATE 75 MG/1
75 TABLET ORAL DAILY
Status: DISCONTINUED | OUTPATIENT
Start: 2025-05-13 | End: 2025-05-13 | Stop reason: HOSPADM

## 2025-05-12 RX ORDER — SODIUM CHLORIDE 0.9 % (FLUSH) 0.9 %
5-40 SYRINGE (ML) INJECTION PRN
Status: DISCONTINUED | OUTPATIENT
Start: 2025-05-12 | End: 2025-05-12 | Stop reason: HOSPADM

## 2025-05-12 RX ORDER — SODIUM CHLORIDE 0.9 % (FLUSH) 0.9 %
5-40 SYRINGE (ML) INJECTION PRN
Status: DISCONTINUED | OUTPATIENT
Start: 2025-05-12 | End: 2025-05-13 | Stop reason: HOSPADM

## 2025-05-12 RX ORDER — SODIUM CHLORIDE 9 MG/ML
INJECTION, SOLUTION INTRAVENOUS CONTINUOUS
Status: DISCONTINUED | OUTPATIENT
Start: 2025-05-12 | End: 2025-05-12 | Stop reason: HOSPADM

## 2025-05-12 RX ORDER — HEPARIN SODIUM 1000 [USP'U]/ML
INJECTION, SOLUTION INTRAVENOUS; SUBCUTANEOUS PRN
Status: DISCONTINUED | OUTPATIENT
Start: 2025-05-12 | End: 2025-05-12 | Stop reason: HOSPADM

## 2025-05-12 RX ORDER — LISINOPRIL 20 MG/1
20 TABLET ORAL DAILY
Status: DISCONTINUED | OUTPATIENT
Start: 2025-05-12 | End: 2025-05-13 | Stop reason: HOSPADM

## 2025-05-12 RX ORDER — HYDROCORTISONE SODIUM SUCCINATE 100 MG/2ML
200 INJECTION INTRAMUSCULAR; INTRAVENOUS ONCE
Status: DISCONTINUED | OUTPATIENT
Start: 2025-05-12 | End: 2025-05-12

## 2025-05-12 RX ORDER — DIPHENHYDRAMINE HYDROCHLORIDE 50 MG/ML
50 INJECTION, SOLUTION INTRAMUSCULAR; INTRAVENOUS ONCE
Status: DISCONTINUED | OUTPATIENT
Start: 2025-05-12 | End: 2025-05-12

## 2025-05-12 RX ORDER — ACETAMINOPHEN 325 MG/1
650 TABLET ORAL EVERY 4 HOURS PRN
Status: DISCONTINUED | OUTPATIENT
Start: 2025-05-12 | End: 2025-05-13 | Stop reason: HOSPADM

## 2025-05-12 RX ORDER — ATORVASTATIN CALCIUM 80 MG/1
80 TABLET, FILM COATED ORAL EVERY EVENING
Status: DISCONTINUED | OUTPATIENT
Start: 2025-05-12 | End: 2025-05-12 | Stop reason: SDUPTHER

## 2025-05-12 RX ORDER — VITAMIN E 268 MG
400 CAPSULE ORAL DAILY
Status: DISCONTINUED | OUTPATIENT
Start: 2025-05-12 | End: 2025-05-13 | Stop reason: HOSPADM

## 2025-05-12 RX ORDER — FUROSEMIDE 20 MG/1
20 TABLET ORAL 2 TIMES DAILY
Status: DISCONTINUED | OUTPATIENT
Start: 2025-05-12 | End: 2025-05-13 | Stop reason: HOSPADM

## 2025-05-12 RX ORDER — METOPROLOL SUCCINATE 50 MG/1
50 TABLET, EXTENDED RELEASE ORAL DAILY
Status: DISCONTINUED | OUTPATIENT
Start: 2025-05-12 | End: 2025-05-13 | Stop reason: HOSPADM

## 2025-05-12 RX ORDER — ASPIRIN 325 MG
325 TABLET ORAL ONCE
Status: DISCONTINUED | OUTPATIENT
Start: 2025-05-12 | End: 2025-05-12

## 2025-05-12 RX ORDER — MAGNESIUM GLYCINATE 100 MG
2 CAPSULE ORAL 2 TIMES DAILY
Status: DISCONTINUED | OUTPATIENT
Start: 2025-05-12 | End: 2025-05-12 | Stop reason: RX

## 2025-05-12 RX ORDER — NICOTINE 21 MG/24HR
1 PATCH, TRANSDERMAL 24 HOURS TRANSDERMAL DAILY
Status: DISCONTINUED | OUTPATIENT
Start: 2025-05-12 | End: 2025-05-13 | Stop reason: HOSPADM

## 2025-05-12 RX ORDER — ASCORBIC ACID 500 MG
1000 TABLET ORAL DAILY
Status: DISCONTINUED | OUTPATIENT
Start: 2025-05-12 | End: 2025-05-13 | Stop reason: HOSPADM

## 2025-05-12 RX ORDER — FENTANYL CITRATE 50 UG/ML
INJECTION, SOLUTION INTRAMUSCULAR; INTRAVENOUS PRN
Status: DISCONTINUED | OUTPATIENT
Start: 2025-05-12 | End: 2025-05-12 | Stop reason: HOSPADM

## 2025-05-12 RX ORDER — POTASSIUM CITRATE 1080 MG/1
10 TABLET, EXTENDED RELEASE ORAL 2 TIMES DAILY
Status: DISCONTINUED | OUTPATIENT
Start: 2025-05-12 | End: 2025-05-13 | Stop reason: HOSPADM

## 2025-05-12 RX ORDER — FERROUS SULFATE 325(65) MG
325 TABLET ORAL DAILY
Status: DISCONTINUED | OUTPATIENT
Start: 2025-05-12 | End: 2025-05-13 | Stop reason: HOSPADM

## 2025-05-12 RX ORDER — SODIUM CHLORIDE 0.9 % (FLUSH) 0.9 %
5-40 SYRINGE (ML) INJECTION EVERY 12 HOURS SCHEDULED
Status: DISCONTINUED | OUTPATIENT
Start: 2025-05-12 | End: 2025-05-12 | Stop reason: HOSPADM

## 2025-05-12 RX ORDER — IOPAMIDOL 755 MG/ML
INJECTION, SOLUTION INTRAVASCULAR PRN
Status: DISCONTINUED | OUTPATIENT
Start: 2025-05-12 | End: 2025-05-12 | Stop reason: HOSPADM

## 2025-05-12 RX ORDER — NITROGLYCERIN 0.4 MG/1
0.4 TABLET SUBLINGUAL EVERY 5 MIN PRN
Status: DISCONTINUED | OUTPATIENT
Start: 2025-05-12 | End: 2025-05-12 | Stop reason: HOSPADM

## 2025-05-12 RX ORDER — CLOPIDOGREL 300 MG/1
TABLET, FILM COATED ORAL PRN
Status: DISCONTINUED | OUTPATIENT
Start: 2025-05-12 | End: 2025-05-12 | Stop reason: HOSPADM

## 2025-05-12 RX ORDER — SODIUM CHLORIDE 9 MG/ML
INJECTION, SOLUTION INTRAVENOUS PRN
Status: DISCONTINUED | OUTPATIENT
Start: 2025-05-12 | End: 2025-05-13 | Stop reason: HOSPADM

## 2025-05-12 RX ORDER — MIDAZOLAM HYDROCHLORIDE 1 MG/ML
INJECTION, SOLUTION INTRAMUSCULAR; INTRAVENOUS PRN
Status: DISCONTINUED | OUTPATIENT
Start: 2025-05-12 | End: 2025-05-12 | Stop reason: HOSPADM

## 2025-05-12 RX ADMIN — ATORVASTATIN CALCIUM 80 MG: 80 TABLET, FILM COATED ORAL at 20:01

## 2025-05-12 RX ADMIN — SODIUM CHLORIDE, PRESERVATIVE FREE 10 ML: 5 INJECTION INTRAVENOUS at 20:02

## 2025-05-12 RX ADMIN — POTASSIUM CITRATE 10 MEQ: 10 TABLET ORAL at 20:01

## 2025-05-12 RX ADMIN — LISINOPRIL 20 MG: 20 TABLET ORAL at 13:20

## 2025-05-12 RX ADMIN — Medication 3 MG: at 20:01

## 2025-05-12 RX ADMIN — AMLODIPINE BESYLATE 2.5 MG: 2.5 TABLET ORAL at 13:20

## 2025-05-12 RX ADMIN — SODIUM CHLORIDE: 0.9 INJECTION, SOLUTION INTRAVENOUS at 07:44

## 2025-05-12 ASSESSMENT — PAIN SCALES - GENERAL: PAINLEVEL_OUTOF10: 0

## 2025-05-12 NOTE — PROGRESS NOTES
1100 Care taken over from cath lab, right groin stable . Patient reinstructed on bedrest, instructed to keep legs straight, not to cross legs, not to lift head off of pillow, not to laugh hard, if coughs to guard site with hand, voices understanding.

## 2025-05-12 NOTE — PLAN OF CARE
Problem: Cardiovascular - Adult  Goal: Maintains optimal cardiac output and hemodynamic stability  5/12/2025 1434 by Karen Adhikari RN  Outcome: Progressing  5/12/2025 0953 by Debbie Carias RN  Outcome: Progressing  Flowsheets (Taken 5/12/2025 0720)  Maintains optimal cardiac output and hemodynamic stability:   Monitor blood pressure and heart rate   Monitor urine output and notify Licensed Independent Practitioner for values outside of normal range     Problem: Discharge Planning  Goal: Discharge to home or other facility with appropriate resources  5/12/2025 1434 by Karen Adhikari RN  Outcome: Progressing  5/12/2025 0953 by Debbie Carias RN  Outcome: Progressing  Flowsheets (Taken 5/12/2025 0720)  Discharge to home or other facility with appropriate resources:   Identify barriers to discharge with patient and caregiver   Arrange for needed discharge resources and transportation as appropriate     Problem: Pain  Goal: Verbalizes/displays adequate comfort level or baseline comfort level  5/12/2025 1434 by Karen Adhikari RN  Outcome: Progressing  5/12/2025 0953 by Debbie Carias RN  Outcome: Progressing     Problem: ABCDS Injury Assessment  Goal: Absence of physical injury  5/12/2025 1434 by Karen Adhikari RN  Outcome: Progressing  5/12/2025 0953 by Debbie Carias RN  Outcome: Progressing

## 2025-05-12 NOTE — TELEPHONE ENCOUNTER
Patient called the office stating she received an insurance denial letter in the mail regarding TAVR CTAs.  Advised patient prior authorizations are not completed in the office for the testing that was performed.  In attempting to check authorization submission, patient hung up.    There is no denial scanned in under Media tab.    Under Referral tab, Eleuterio Cerrato documented the following:  CTA Abd/Pelvis - Approved - Auth Number [id number]  CTA Chest - Approved - Auth Number [id number]  CTA Neck - Denied    The only documentation uploaded by Eleuterio Cerrato was Dr. Rodriguez's office note and labs drawn on 4/10/2025.    *Eleuterio documented on 5/6/2025: Called MDO to give the P2P information but the call was forwarded to voicemail and gave the information in voicemail    *Eleuterio documented on 5/7/2025: sent the denied letter through fax    *Eleuterio documented on 5/8/2025: Checked in Portal, Auth is denied and p2p given    None of the above information regarding authorization denial was relayed to the [facility].    Attempted to reach Medical Director with [employer] - automated system stated to reach out to patient's insurance company.    Called and spoke to Keo with [employer], who stated appeal and peer to peer is no longer available.    Spoke to  - she is to reach out to [employer] team.

## 2025-05-12 NOTE — BRIEF OP NOTE
[facility]  Sedation/Analgesia Post Sedation Record    Pt Name: Marlene Ray  Account number: [id number]  MRN: [id number]  YOB: 1940  Procedure Performed By: Raffi Rodriguez MD MD FACC, FSCAI, RPVI  Primary Care Physician: Emanuel Michael,   Date: 5/12/2025    POST-PROCEDURE    Physicians/Assistants: Raffi Rodriguez MD, GHAZAL, [facility], RPVI    Procedure Performed: PCI    Sedation/Anesthesia: Versed/ Fentanyl and 2% xylocaine local anesthesia.      Estimated Blood Loss: < 50 ml.     Specimens Removed: None         Disposition of Specimen: N/A        Complications: No Immediate Complications.       Post-procedure Diagnosis/Findings:       PCI of prox LCX x 1 ALEXANDRA with 3.0 Shockwave  DAPT  GDMT for CAD    Ostial RCA 90% pending        Electronically signed by Raffi Rodriguez MD on 5/12/25 at 10:44 AM EDT   Interventional Cardiology

## 2025-05-12 NOTE — PLAN OF CARE
Patient admitted to room 8A21 from .  Patient oriented to room, call light, bed rails, phone, lights and bathroom.  Patient instructed about the schedule of the day including: vital sign frequency, lab draws, possible tests, frequency of MD and staff rounds, including RN/MD rounding together at bedside, daily weights, and I &O's.  Patient instructed about prescribed diet, how to use 8MENU, and television. bed alarm in place, patient aware of placement and reason. Telemetry box 8A29 in place, patient aware of placement and reason.  Bed locked, in lowest position, side rails up 2/4, call light within reach. Rt femoral site CDI, soft, no signs of bleeding or hematoma.

## 2025-05-12 NOTE — PROGRESS NOTES
0658 Patient admitted to 16  ambulatory for Heart Cath.  Patient NPO. Patient accompanied by daughter.  Vital signs obtained.   Assessment and data collection intiated.   Oriented to room.  Policies and procedures for  explained.   All questions answered with no further questions at this time.   Fall prevention and safety precautions discussed with patient.

## 2025-05-12 NOTE — PROGRESS NOTES
Inpatient Cardiac Rehabilitation Consult    Received consult for Phase II Cardiac Rehabilitation.  Patient needs cardiac rehab due to PCI on 5/12/25.  Importance of Cardiac Rehab discussed with patient.  Reviewed cardiac rehab class times.  Patient questions answered.  Marlene is planning for a TAVR procedure.  Once she has had that, we will send referral to [facility].  Cardiac Rehab brochure given.

## 2025-05-12 NOTE — PLAN OF CARE
Problem: Cardiovascular - Adult  Goal: Maintains optimal cardiac output and hemodynamic stability  Outcome: Progressing  Flowsheets (Taken 5/12/2025 0720)  Maintains optimal cardiac output and hemodynamic stability:   Monitor blood pressure and heart rate   Monitor urine output and notify Licensed Independent Practitioner for values outside of normal range     Problem: Discharge Planning  Goal: Discharge to home or other facility with appropriate resources  Outcome: Progressing  Flowsheets (Taken 5/12/2025 0720)  Discharge to home or other facility with appropriate resources:   Identify barriers to discharge with patient and caregiver   Arrange for needed discharge resources and transportation as appropriate     Problem: Pain  Goal: Verbalizes/displays adequate comfort level or baseline comfort level  Outcome: Progressing     Problem: ABCDS Injury Assessment  Goal: Absence of physical injury  Outcome: Progressing   Care plan reviewed with patient.  Patient verbalizes understanding of the plan of care and contributes to goal setting.

## 2025-05-12 NOTE — H&P
questions and wished to proceed; the consent form was signed.        MEDICAL HISTORY   has a past medical history of A-fib (HCC), Cancer (HCC), and Hypertension.    SURGICAL HISTORY   has a past surgical history that includes Skin cancer excision; Appendectomy; cyst removal; and Mohs surgery (Right, 6/16/2022).  Additional information:       ALLERGIES   Allergies as of 05/08/2025 - Fully Reviewed 04/15/2025   Allergen Reaction Noted    Hydrocodone Swelling 03/05/2025    Hydrocodone-acetaminophen Dizziness or Vertigo and Other (See Comments) 08/07/2015     Additional information:       MEDICATIONS     Current Facility-Administered Medications:     0.9 % sodium chloride infusion, , IntraVENous, Continuous, Mortimer, Connor, PA-C    sodium chloride flush 0.9 % injection 5-40 mL, 5-40 mL, IntraVENous, 2 times per day, Mortimer, Connor, PA-C    sodium chloride flush 0.9 % injection 5-40 mL, 5-40 mL, IntraVENous, PRN, Mortimer, Connor, PA-C    0.9 % sodium chloride infusion, , IntraVENous, Continuous, Mortimer, Connor, PA-C, Last Rate: 75 mL/hr at 05/12/25 0744, New Bag at 05/12/25 0744    aspirin tablet 325 mg, 325 mg, Oral, Once, Mortimer, Connor, PA-C    nitroGLYCERIN (NITROSTAT) SL tablet 0.4 mg, 0.4 mg, SubLINGual, Q5 Min PRN, Mortimer, Connor, PA-C  Prior to Admission medications    Medication Sig Start Date End Date Taking? Authorizing Provider   amLODIPine (NORVASC) 2.5 MG tablet Take 1 tablet by mouth daily    Rodrigo Marcial MD   aspirin 81 MG chewable tablet Take 1 tablet by mouth daily 3/6/25   Rodrigo Marcial MD   atorvastatin (LIPITOR) 80 MG tablet Take 1 tablet by mouth every evening 3/6/25   Rodrigo Marcial MD   ferrous sulfate (IRON 325) 325 (65 Fe) MG tablet Take 1 tablet by mouth daily 3/27/25   Rodrigo Marcial MD   Magnesium Glycinate 100 MG CAPS Take 2 capsules by mouth 2 times daily 3/13/25   Rodrigo Marcial MD   metoprolol succinate (TOPROL XL) 50 MG extended release

## 2025-05-13 VITALS
WEIGHT: 129.63 LBS | HEIGHT: 64 IN | RESPIRATION RATE: 20 BRPM | SYSTOLIC BLOOD PRESSURE: 144 MMHG | BODY MASS INDEX: 22.13 KG/M2 | HEART RATE: 76 BPM | TEMPERATURE: 97.9 F | DIASTOLIC BLOOD PRESSURE: 58 MMHG | OXYGEN SATURATION: 98 %

## 2025-05-13 LAB
ANION GAP SERPL CALC-SCNC: 10 MEQ/L (ref 8–16)
BUN SERPL-MCNC: 20 MG/DL (ref 8–23)
CALCIUM SERPL-MCNC: 9 MG/DL (ref 8.8–10.2)
CHLORIDE SERPL-SCNC: 106 MEQ/L (ref 98–111)
CHOLEST SERPL-MCNC: 149 MG/DL (ref 100–199)
CO2 SERPL-SCNC: 24 MEQ/L (ref 22–29)
CREAT SERPL-MCNC: 1 MG/DL (ref 0.5–0.9)
DEPRECATED RDW RBC AUTO: 61.5 FL (ref 35–45)
ERYTHROCYTE [DISTWIDTH] IN BLOOD BY AUTOMATED COUNT: 17.4 % (ref 11.5–14.5)
GFR SERPL CREATININE-BSD FRML MDRD: 55 ML/MIN/1.73M2
GLUCOSE SERPL-MCNC: 103 MG/DL (ref 74–109)
HCT VFR BLD AUTO: 35 % (ref 37–47)
HDLC SERPL-MCNC: 73 MG/DL
HGB BLD-MCNC: 11.2 GM/DL (ref 12–16)
LDLC SERPL CALC-MCNC: 59 MG/DL
MCH RBC QN AUTO: 30.4 PG (ref 26–33)
MCHC RBC AUTO-ENTMCNC: 32 GM/DL (ref 32.2–35.5)
MCV RBC AUTO: 94.9 FL (ref 81–99)
PLATELET # BLD AUTO: 213 THOU/MM3 (ref 130–400)
PMV BLD AUTO: 9.8 FL (ref 9.4–12.4)
POTASSIUM SERPL-SCNC: 4.5 MEQ/L (ref 3.5–5.2)
RBC # BLD AUTO: 3.69 MILL/MM3 (ref 4.2–5.4)
SODIUM SERPL-SCNC: 140 MEQ/L (ref 135–145)
TRIGL SERPL-MCNC: 87 MG/DL (ref 0–199)
WBC # BLD AUTO: 7.2 THOU/MM3 (ref 4.8–10.8)

## 2025-05-13 PROCEDURE — 36415 COLL VENOUS BLD VENIPUNCTURE: CPT

## 2025-05-13 PROCEDURE — 99232 SBSQ HOSP IP/OBS MODERATE 35: CPT | Performed by: STUDENT IN AN ORGANIZED HEALTH CARE EDUCATION/TRAINING PROGRAM

## 2025-05-13 PROCEDURE — 80061 LIPID PANEL: CPT

## 2025-05-13 PROCEDURE — 85027 COMPLETE CBC AUTOMATED: CPT

## 2025-05-13 PROCEDURE — 6370000000 HC RX 637 (ALT 250 FOR IP): Performed by: INTERNAL MEDICINE

## 2025-05-13 PROCEDURE — 80048 BASIC METABOLIC PNL TOTAL CA: CPT

## 2025-05-13 RX ORDER — CLOPIDOGREL BISULFATE 75 MG/1
75 TABLET ORAL DAILY
Qty: 30 TABLET | Refills: 3 | Status: SHIPPED | OUTPATIENT
Start: 2025-05-14

## 2025-05-13 RX ADMIN — AMLODIPINE BESYLATE 2.5 MG: 2.5 TABLET ORAL at 07:51

## 2025-05-13 RX ADMIN — ASPIRIN 81 MG: 81 TABLET, CHEWABLE ORAL at 07:52

## 2025-05-13 RX ADMIN — CLOPIDOGREL BISULFATE 75 MG: 75 TABLET, FILM COATED ORAL at 07:52

## 2025-05-13 RX ADMIN — LISINOPRIL 20 MG: 20 TABLET ORAL at 07:51

## 2025-05-13 RX ADMIN — FUROSEMIDE 20 MG: 20 TABLET ORAL at 07:51

## 2025-05-13 NOTE — DISCHARGE INSTRUCTIONS
Do not stop taking clopidogrel or aspirin without talking to your doctor.       Discharge Instructions for Femoral Heart Catheterization  Take it easy for 3-4 days, limit vigorous activity (contact sports) for 2 weeks  No driving for 2 days  No lifting over 5 lbs for 1 week, this is a half gallon of milk  May shower after 24 hours  May remove dressing and apply band-aid after 24 hours.   Apply a clean band-aid daily for 5 days over the incision.  No creams, ointments, or powders near site for 2 weeks.  No hot tub, swimming or tub bath for 1 week  Gently clean your site daily using soap and water while standing in the shower. Dry thoroughly.  10. Monitor site for infection- redness, increased pain, hardness or drainage at site, elevated temperature, poor healing of incision, fever, chills.   11. If bleeding from incision, apply pressure and call 911 immediately.

## 2025-05-13 NOTE — PLAN OF CARE
Problem: Cardiovascular - Adult  Goal: Maintains optimal cardiac output and hemodynamic stability  5/12/2025 2112 by Asha Wiley RN  Outcome: Progressing  Flowsheets (Taken 5/12/2025 1945)  Maintains optimal cardiac output and hemodynamic stability: Monitor blood pressure and heart rate     Problem: Discharge Planning  Goal: Discharge to home or other facility with appropriate resources  5/12/2025 2112 by Asha Wiley RN  Outcome: Progressing  Flowsheets (Taken 5/12/2025 1945)  Discharge to home or other facility with appropriate resources: Identify barriers to discharge with patient and caregiver     Problem: Pain  Goal: Verbalizes/displays adequate comfort level or baseline comfort level  5/12/2025 2112 by Asha Wiley RN  Outcome: Progressing  Flowsheets (Taken 5/12/2025 1945)  Verbalizes/displays adequate comfort level or baseline comfort level: Encourage patient to monitor pain and request assistance     Problem: ABCDS Injury Assessment  Goal: Absence of physical injury  5/12/2025 2112 by Asha Wiley RN  Outcome: Progressing

## 2025-05-13 NOTE — PROGRESS NOTES
Cardiology Progress Note      Patient:  Marlene Ray  YOB: 1940  MRN: [id number]   Acct: [id number]  Admit Date:  5/12/2025  Primary Cardiologist: Raffi Rodriguez MD    Patient presented for OP elective LHC, s/p PCI to Lcx x 1 ALEXANDRA with shockwave, residual RCA    Subjective (Events in last 24 hours):   Pt awake, alert. NAD. Denies cp or sob, no swelling. D/w patient about importance of brilinta and asa for new heart stents. D/w patient about cardiac rehab will reach out. Recommend this. They understand to monitor and watch cath site for any new or worsening pain/swelling/etc and return to ED if experiencing any. They will f/u in 1-3 weeks in the office per scheduling.     Right FEMORAL-no ecchymosis, nontender, no edema, NVI  She does have irritation along an old scar just above the femoral site    Objective:   BP (!) 144/58   Pulse 76   Temp 97.9 °F (36.6 °C) (Oral)   Resp 20   Ht 1.626 m (5' 4\")   Wt 58.8 kg (129 lb 10.1 oz)   SpO2 98%   BMI 22.25 kg/m²      vss  TELEMETRY: nsr    Physical Exam:  General Appearance: alert and oriented to person, place and time, in no acute distress  Cardiovascular: normal rate, regular rhythm, normal S1 and S2, no murmurs, rubs, clicks, or gallops, distal pulses intact, no carotid bruits, no JVD  Pulmonary/Chest: clear to auscultation bilaterally- no wheezes, rales or rhonchi, normal air movement, no respiratory distress  Abdomen: soft, non-tender, non-distended, normal bowel sounds, no masses   Extremities: no cyanosis, clubbing or edema, pulse   Skin: warm and dry, no rash or erythema  Neurological: alert, oriented, normal speech, no focal findings or movement disorder noted    Medications:    sodium chloride flush  5-40 mL IntraVENous 2 times per day    atorvastatin  80 mg Oral Nightly    aspirin  81 mg Oral Daily    clopidogrel  75 mg Oral Daily    lisinopril  20 mg Oral Daily    vitamin C  1,000 mg Oral Daily    vitamin E  400 Units Oral Daily

## 2025-05-13 NOTE — PROGRESS NOTES
Discharge teaching and instructions for diagnosis/procedure of left heart cath completed with patient using teachback method. AVS reviewed. Printed prescriptions given to patient. Patient voiced understanding regarding prescriptions, follow up appointments, and care of self at home. Discharged ambulatory to  home with support per family    Heart attack teaching covered with patient and/or family or significant other:  Signs and symptoms of a heart attack.  When to call 911 and the importance of calling 911.  Personal risk factors and ways to lower their risk.  4.   Importance of quitting smoking if applicable.     Heart attack booklet given to the patient and/or family or significant other. Reviewed:  How to take Nitroglycerin.  The importance of participating in Cardiac Rehab and hours of operation.   Heart Healthy Diet.  Risk factor modification.(Overweight, Obesity, Diabetes, Hypertension, Smoking, High Cholesterol, Stress)  Discharge instructions for Cath/Intervention procedure site if applicable.             36 yo  at 39w6d by LMP consistent with 1st trimester sonogram here contractions that started at 2200, 10/10 intensity, q1-2min. Denies LOF and VB. Feels good FM. No complications. Last PMD visit was on 10/15/18, no VE was done. 38 yo  at 39w6d w/ 10/19/2018 by LMP consistent with 1st trimester sonogram here contractions that started at 2200, 10/10 intensity, q1-2min. Denies LOF and VB. Feels good FM. No complications. Last PMD visit was on 10/15/18, no VE was done.

## 2025-05-14 NOTE — TELEPHONE ENCOUNTER
Scheduled 5/30/2025 at 11:00 am, with Dr. Rodriguez.  Spoke to Isa.  Placed on provider's schedule.  Order faxed.

## 2025-05-14 NOTE — TELEPHONE ENCOUNTER
Orders received from Dr. Rodriguez for PCI to RCA.    [place], can you please schedule PCI with Dr. Rodriguez?

## 2025-05-16 ENCOUNTER — OFFICE VISIT (OUTPATIENT)
Dept: CARDIOLOGY CLINIC | Age: 85
End: 2025-05-16
Payer: MEDICARE

## 2025-05-16 VITALS
HEART RATE: 71 BPM | BODY MASS INDEX: 22.67 KG/M2 | DIASTOLIC BLOOD PRESSURE: 66 MMHG | WEIGHT: 132.8 LBS | OXYGEN SATURATION: 99 % | SYSTOLIC BLOOD PRESSURE: 136 MMHG | HEIGHT: 64 IN

## 2025-05-16 DIAGNOSIS — I25.10 CORONARY ARTERY DISEASE INVOLVING NATIVE CORONARY ARTERY OF NATIVE HEART WITHOUT ANGINA PECTORIS: ICD-10-CM

## 2025-05-16 DIAGNOSIS — Z72.0 TOBACCO ABUSE DISORDER: ICD-10-CM

## 2025-05-16 DIAGNOSIS — I65.23 CAROTID STENOSIS, ASYMPTOMATIC, BILATERAL: Chronic | ICD-10-CM

## 2025-05-16 DIAGNOSIS — Z95.820 STATUS POST ANGIOPLASTY WITH STENT: ICD-10-CM

## 2025-05-16 DIAGNOSIS — I35.0 SEVERE AORTIC STENOSIS: Primary | ICD-10-CM

## 2025-05-16 PROCEDURE — 1124F ACP DISCUSS-NO DSCNMKR DOCD: CPT | Performed by: NURSE PRACTITIONER

## 2025-05-16 PROCEDURE — 99214 OFFICE O/P EST MOD 30 MIN: CPT | Performed by: NURSE PRACTITIONER

## 2025-05-16 NOTE — PROGRESS NOTES
Marlene Ray (:  1940) is a 85 y.o. female, Established patient, here for  Pre-TAVR evaluation.       Check-Up    Primary Cardiologist: Raffi Rodriguez MD  Primary cardiologist Rayna at [place]     Assessment & Plan  Severe Symptomatic Aortic Stenosis, NYHA III   Severe AS  PAF, [id number] = 6   Tobacco use    1. Severe symptomatic aortic stenosis:  - Maintain an active lifestyle without overexertion leading to fatigue.  - Continue current medication regimen, including Plavix and baby aspirin, to prevent platelet aggregation.  - Monitor weight daily and contact the clinic if experiencing a sudden weight gain of 3 pounds overnight or 5 to 7 pounds within a week.  - Monitor blood pressure at home.  - Ensure adequate hydration by consuming approximately 64 ounces of fluid daily.  - Limit salt intake to prevent fluid retention and potential heart failure exacerbation.  - Neurointerventionalist evaluation for carotid artery disease scheduled for 2025.    2. Atrial fibrillation:  - Option of Watchman device previously discussed.  - Continue Plavix and baby aspirin regimen.    3. Coronary artery disease:  - Recently underwent stent placement in the left coronary artery.  - Scheduled for stent placement in the right coronary artery; date unconfirmed.    4. Hypertension:  - Blood pressure readings within normal limits today (136/66).    5. Carotid artery disease:  - Neurointerventionalist evaluation scheduled for 2025.    6. Tobacco abuse   - Tobacco abuse- still smoking 0.5 packs a day  Discussed the importance of smoking cessation and the patient was counseled on that for over 5 minutes. The patient expressed understanding of the importance of that and the implications of not doing so.    PLAN/patient instructions:   Continue current medications as prescribed.    Stay as active as you can. - just don't over exert yourself.     Eat heart healthy diet.     Follow-up with your PCP as

## 2025-05-16 NOTE — PATIENT INSTRUCTIONS
Continue current medications as prescribed.    Stay as active as you can. - just don't over exert yourself.     Eat heart healthy diet.     Follow-up with your PCP as scheduled.    Follow-up with XOCHILT Owen CNP in 3 months as scheduled or sooner if need.     Office hours:   Mon-Thurs 8-4:30  Friday 8-12  Phone: [phone]    Continue:  Continue current medications  Daily weights and record  Fluid restriction of 2 Liters per day  Monitor BP    Call the Heart Failure Clinic for any of the following symptoms:   Weight gain of 3 pounds in 1 day or 5 pounds in 1 week  Increased shortness of breath  Shortness of breath while laying down  Chest pain  Swelling in feet, ankles or legs  Bloating in abdomen  Fatigue

## 2025-05-20 ENCOUNTER — OFFICE VISIT (OUTPATIENT)
Dept: NEUROLOGY | Age: 85
End: 2025-05-20
Payer: MEDICARE

## 2025-05-20 VITALS
HEIGHT: 64 IN | WEIGHT: 133 LBS | BODY MASS INDEX: 22.71 KG/M2 | SYSTOLIC BLOOD PRESSURE: 129 MMHG | DIASTOLIC BLOOD PRESSURE: 60 MMHG | HEART RATE: 63 BPM

## 2025-05-20 DIAGNOSIS — I65.23 BILATERAL CAROTID ARTERY STENOSIS: Primary | ICD-10-CM

## 2025-05-20 PROCEDURE — 1123F ACP DISCUSS/DSCN MKR DOCD: CPT

## 2025-05-20 PROCEDURE — 1159F MED LIST DOCD IN RCRD: CPT

## 2025-05-20 PROCEDURE — 99205 OFFICE O/P NEW HI 60 MIN: CPT

## 2025-05-20 PROCEDURE — 1160F RVW MEDS BY RX/DR IN RCRD: CPT

## 2025-05-20 ASSESSMENT — ENCOUNTER SYMPTOMS
SHORTNESS OF BREATH: 1
TROUBLE SWALLOWING: 0
RHINORRHEA: 0
ABDOMINAL PAIN: 0

## 2025-05-20 NOTE — PROGRESS NOTES
Neurointerventional Office Note    Date:5/20/25    Patient Name:Marlene Ray     YOB: 1940     Age:85 y.o.    Reason for Consult:  Evaluate for bilateral carotid artery stenosis    Chief Complaint:   Chief Complaint   Patient presents with    New Patient     Bilateral Carotid Artery Stenosis  Referred by: Dr. Michael LINDA N @ [facility]       Subjective   Marlene Ray is a 85 y.o. female who presents to the interventional neurology clinic as a referral from Dr. Raffi Rodriguez for  bilateral carotid artery stenosis. Her medical history includes current cigarette smoker, aortic stenosis, coronary artery disease, and paroxysmal A-fib (not on OAC due to prior GI bleeding). CTA neck 4/28/25 demonstrated 71% stenosis of the right ICA and 75% stenosis of the left ICA.  Patient arrives with her daughter and ambulating independently. She reports that she has known about her carotid stenosis since 2012 and was previously being followed with carotid ultrasounds until her prior doctor retired. Based on chart review, previous carotid ultrasounds demonstrated 50-69% bilateral stenosis from 2012 - 2020. She follows with cardiology and has required cardiac stents and has an additional heart cath on 5/30/25. She requires a TAVR for severe aortic stenosis and reports that has also been conversations about a watchman device since she has been unable to tolerate anticoagulation in the past. Patient denies a hx of prior stroke or stroke like symptoms.  Based on her bilateral carotid stenosis, Dr. Bhatti discussed the treatment options including a diagnostic cerebral angiogram with possible carotid stenting. Based on a conversation on risks vs benefits patient and her daughter have elected to proceed with DSA with carotid stent. Patient denies weakness, numbness, vision deficits, difficulty speaking and headaches. She currently smokes 7 cigarettes per day.     Review of Systems   Review of Systems   Constitutional:  Positive for

## 2025-05-22 NOTE — TELEPHONE ENCOUNTER
Pt scheduled for cerebral angiogram with possible stent placement 5/23.    Pt is scheduled for PCI with Dr. Rodriguez on 5/30.     Dr. Bhatti,     May we proceed as scheduled with PCI and TAVR to follow at later date?

## 2025-05-23 ENCOUNTER — HOSPITAL ENCOUNTER (OUTPATIENT)
Age: 85
Discharge: HOME | End: 2025-05-23
Attending: PSYCHIATRY & NEUROLOGY | Admitting: PSYCHIATRY & NEUROLOGY
Payer: MEDICARE

## 2025-05-23 VITALS
WEIGHT: 130.95 LBS | TEMPERATURE: 98.1 F | SYSTOLIC BLOOD PRESSURE: 130 MMHG | RESPIRATION RATE: 16 BRPM | HEIGHT: 64 IN | HEART RATE: 60 BPM | BODY MASS INDEX: 22.36 KG/M2 | OXYGEN SATURATION: 99 % | DIASTOLIC BLOOD PRESSURE: 47 MMHG

## 2025-05-23 DIAGNOSIS — I65.29 CAROTID STENOSIS: ICD-10-CM

## 2025-05-23 PROBLEM — I65.23 CAROTID STENOSIS, ASYMPTOMATIC, BILATERAL: Status: ACTIVE | Noted: 2025-05-23

## 2025-05-23 LAB
ABO GROUP BLD: NORMAL
ACTIVATED CLOTTING TIME: 153 SECONDS (ref 1–150)
ACTIVATED CLOTTING TIME: 164 SECONDS (ref 1–150)
ACTIVATED CLOTTING TIME: 176 SECONDS (ref 1–150)
ANION GAP SERPL CALC-SCNC: 11 MEQ/L (ref 8–16)
APTT PPP: 29.5 SECONDS (ref 22–38)
BUN SERPL-MCNC: 26 MG/DL (ref 8–23)
CALCIUM SERPL-MCNC: 10 MG/DL (ref 8.8–10.2)
CHLORIDE SERPL-SCNC: 101 MEQ/L (ref 98–111)
CO2 SERPL-SCNC: 26 MEQ/L (ref 22–29)
CREAT SERPL-MCNC: 1 MG/DL (ref 0.5–0.9)
DEPRECATED RDW RBC AUTO: 58.9 FL (ref 35–45)
ECHO BSA: 1.64 M2
EKG ATRIAL RATE: 65 BPM
EKG P AXIS: 57 DEGREES
EKG P-R INTERVAL: 180 MS
EKG Q-T INTERVAL: 398 MS
EKG QRS DURATION: 78 MS
EKG QTC CALCULATION (BAZETT): 413 MS
EKG R AXIS: -11 DEGREES
EKG T AXIS: 76 DEGREES
EKG VENTRICULAR RATE: 65 BPM
ERYTHROCYTE [DISTWIDTH] IN BLOOD BY AUTOMATED COUNT: 17.3 % (ref 11.5–14.5)
GFR SERPL CREATININE-BSD FRML MDRD: 55 ML/MIN/1.73M2
GLUCOSE SERPL-MCNC: 105 MG/DL (ref 74–109)
HCT VFR BLD AUTO: 35.7 % (ref 37–47)
HGB BLD-MCNC: 11.7 GM/DL (ref 12–16)
IAT IGG-SP REAG SERPL QL: NORMAL
INR PPP: 0.97 (ref 0.85–1.13)
KCT BLD-ACNC: 210 SECONDS (ref 1–150)
MCH RBC QN AUTO: 30.5 PG (ref 26–33)
MCHC RBC AUTO-ENTMCNC: 32.8 GM/DL (ref 32.2–35.5)
MCV RBC AUTO: 93 FL (ref 81–99)
PLATELET # BLD AUTO: 253 THOU/MM3 (ref 130–400)
PMV BLD AUTO: 9.6 FL (ref 9.4–12.4)
POTASSIUM SERPL-SCNC: 4.9 MEQ/L (ref 3.5–5.2)
RBC # BLD AUTO: 3.84 MILL/MM3 (ref 4.2–5.4)
RH BLD: NORMAL
SODIUM SERPL-SCNC: 138 MEQ/L (ref 135–145)
WBC # BLD AUTO: 6 THOU/MM3 (ref 4.8–10.8)

## 2025-05-23 PROCEDURE — 85610 PROTHROMBIN TIME: CPT

## 2025-05-23 PROCEDURE — 85347 COAGULATION TIME ACTIVATED: CPT

## 2025-05-23 PROCEDURE — 85730 THROMBOPLASTIN TIME PARTIAL: CPT

## 2025-05-23 PROCEDURE — 93005 ELECTROCARDIOGRAM TRACING: CPT | Performed by: NURSE PRACTITIONER

## 2025-05-23 PROCEDURE — 6370000000 HC RX 637 (ALT 250 FOR IP): Performed by: NURSE PRACTITIONER

## 2025-05-23 PROCEDURE — 99232 SBSQ HOSP IP/OBS MODERATE 35: CPT | Performed by: NURSE PRACTITIONER

## 2025-05-23 PROCEDURE — 86885 COOMBS TEST INDIRECT QUAL: CPT

## 2025-05-23 PROCEDURE — 36223 PLACE CATH CAROTID/INOM ART: CPT

## 2025-05-23 PROCEDURE — 2500000003 HC RX 250 WO HCPCS: Performed by: NURSE PRACTITIONER

## 2025-05-23 PROCEDURE — 2580000003 HC RX 258: Performed by: NURSE PRACTITIONER

## 2025-05-23 PROCEDURE — 80048 BASIC METABOLIC PNL TOTAL CA: CPT

## 2025-05-23 PROCEDURE — 6360000004 HC RX CONTRAST MEDICATION: Performed by: PSYCHIATRY & NEUROLOGY

## 2025-05-23 PROCEDURE — 75710 ARTERY X-RAYS ARM/LEG: CPT

## 2025-05-23 PROCEDURE — 36221 PLACE CATH THORACIC AORTA: CPT

## 2025-05-23 PROCEDURE — 36415 COLL VENOUS BLD VENIPUNCTURE: CPT

## 2025-05-23 PROCEDURE — 36215 PLACE CATHETER IN ARTERY: CPT

## 2025-05-23 PROCEDURE — 93010 ELECTROCARDIOGRAM REPORT: CPT | Performed by: INTERNAL MEDICINE

## 2025-05-23 PROCEDURE — 86901 BLOOD TYPING SEROLOGIC RH(D): CPT

## 2025-05-23 PROCEDURE — 86900 BLOOD TYPING SEROLOGIC ABO: CPT

## 2025-05-23 PROCEDURE — 85027 COMPLETE CBC AUTOMATED: CPT

## 2025-05-23 PROCEDURE — 6360000002 HC RX W HCPCS: Performed by: PSYCHIATRY & NEUROLOGY

## 2025-05-23 RX ORDER — SODIUM CHLORIDE 0.9 % (FLUSH) 0.9 %
5-40 SYRINGE (ML) INJECTION PRN
Status: DISCONTINUED | OUTPATIENT
Start: 2025-05-23 | End: 2025-05-24 | Stop reason: HOSPADM

## 2025-05-23 RX ORDER — FENTANYL CITRATE 50 UG/ML
INJECTION, SOLUTION INTRAMUSCULAR; INTRAVENOUS PRN
Status: COMPLETED | OUTPATIENT
Start: 2025-05-23 | End: 2025-05-23

## 2025-05-23 RX ORDER — SODIUM CHLORIDE 9 MG/ML
INJECTION, SOLUTION INTRAVENOUS PRN
Status: DISCONTINUED | OUTPATIENT
Start: 2025-05-23 | End: 2025-05-24 | Stop reason: HOSPADM

## 2025-05-23 RX ORDER — SODIUM CHLORIDE 0.9 % (FLUSH) 0.9 %
5-40 SYRINGE (ML) INJECTION EVERY 12 HOURS SCHEDULED
Status: DISCONTINUED | OUTPATIENT
Start: 2025-05-23 | End: 2025-05-24 | Stop reason: HOSPADM

## 2025-05-23 RX ORDER — IOPAMIDOL 612 MG/ML
INJECTION, SOLUTION INTRAVASCULAR PRN
Status: COMPLETED | OUTPATIENT
Start: 2025-05-23 | End: 2025-05-23

## 2025-05-23 RX ORDER — SODIUM CHLORIDE 9 MG/ML
INJECTION, SOLUTION INTRAVENOUS CONTINUOUS
Status: DISCONTINUED | OUTPATIENT
Start: 2025-05-23 | End: 2025-05-24 | Stop reason: HOSPADM

## 2025-05-23 RX ORDER — MIDAZOLAM HYDROCHLORIDE 1 MG/ML
INJECTION, SOLUTION INTRAMUSCULAR; INTRAVENOUS PRN
Status: COMPLETED | OUTPATIENT
Start: 2025-05-23 | End: 2025-05-23

## 2025-05-23 RX ORDER — ONDANSETRON 2 MG/ML
4 INJECTION INTRAMUSCULAR; INTRAVENOUS EVERY 6 HOURS PRN
Status: DISCONTINUED | OUTPATIENT
Start: 2025-05-23 | End: 2025-05-24 | Stop reason: HOSPADM

## 2025-05-23 RX ORDER — HEPARIN SODIUM 1000 [USP'U]/ML
INJECTION, SOLUTION INTRAVENOUS; SUBCUTANEOUS PRN
Status: COMPLETED | OUTPATIENT
Start: 2025-05-23 | End: 2025-05-23

## 2025-05-23 RX ORDER — ACETAMINOPHEN 325 MG/1
650 TABLET ORAL EVERY 4 HOURS PRN
Status: DISCONTINUED | OUTPATIENT
Start: 2025-05-23 | End: 2025-05-24 | Stop reason: HOSPADM

## 2025-05-23 RX ADMIN — LIDOCAINE HYDROCHLORIDE 6 ML: 20 INJECTION, SOLUTION INFILTRATION; PERINEURAL at 09:36

## 2025-05-23 RX ADMIN — IOPAMIDOL 65 ML: 612 INJECTION, SOLUTION INTRAVENOUS at 11:02

## 2025-05-23 RX ADMIN — HEPARIN SODIUM 2000 UNITS: 1000 INJECTION INTRAVENOUS; SUBCUTANEOUS at 10:09

## 2025-05-23 RX ADMIN — MIDAZOLAM 1 MG: 1 INJECTION INTRAMUSCULAR; INTRAVENOUS at 09:30

## 2025-05-23 RX ADMIN — HEPARIN SODIUM 5000 UNITS: 1000 INJECTION INTRAVENOUS; SUBCUTANEOUS at 09:42

## 2025-05-23 RX ADMIN — ACETAMINOPHEN 650 MG: 325 TABLET ORAL at 16:00

## 2025-05-23 RX ADMIN — FENTANYL CITRATE 50 MCG: 50 INJECTION, SOLUTION INTRAMUSCULAR; INTRAVENOUS at 09:30

## 2025-05-23 RX ADMIN — ACETAMINOPHEN 650 MG: 325 TABLET ORAL at 20:25

## 2025-05-23 RX ADMIN — SODIUM CHLORIDE: 9 INJECTION, SOLUTION INTRAVENOUS at 08:30

## 2025-05-23 RX ADMIN — SODIUM CHLORIDE, PRESERVATIVE FREE 10 ML: 5 INJECTION INTRAVENOUS at 20:26

## 2025-05-23 ASSESSMENT — VISUAL ACUITY: VA_NORMAL: 1

## 2025-05-23 ASSESSMENT — PAIN DESCRIPTION - DESCRIPTORS
DESCRIPTORS: ACHING
DESCRIPTORS: DISCOMFORT

## 2025-05-23 ASSESSMENT — PAIN DESCRIPTION - LOCATION
LOCATION: OTHER (COMMENT)
LOCATION: HEAD

## 2025-05-23 ASSESSMENT — PAIN SCALES - GENERAL
PAINLEVEL_OUTOF10: 5
PAINLEVEL_OUTOF10: 5

## 2025-05-23 ASSESSMENT — PAIN DESCRIPTION - ORIENTATION
ORIENTATION: UPPER
ORIENTATION: LEFT

## 2025-05-23 ASSESSMENT — PAIN - FUNCTIONAL ASSESSMENT: PAIN_FUNCTIONAL_ASSESSMENT: ACTIVITIES ARE NOT PREVENTED

## 2025-05-23 ASSESSMENT — PAIN DESCRIPTION - FREQUENCY: FREQUENCY: INTERMITTENT

## 2025-05-23 ASSESSMENT — ENCOUNTER SYMPTOMS
SHORTNESS OF BREATH: 1
COUGH: 1

## 2025-05-23 NOTE — PROCEDURES
PROCEDURE NOTE  Date: 5/23/2025   Name: Marlene Ray  YOB: 1940    Procedures      PREOPERATIVE DIAGNOSIS: Bilateral ICA stenosis    POSTOPERATIVE DIAGNOSIS: Same    SURGEONS: Leandro Bhatti MD    ANESTHESIA: Lidocaine 1% for local anesthesia. Fentanyl administered IV in divided doses for moderate sedation/analgesia    MEDICATIONS: Heparin 4000 units/liter was used for saline flushes, Heparin 5000 units IV bolus    ESTIMATED BLOOD LOSS: 60 cc    COMPLICATIONS: None    HEMOSTASIS: Manual compression l and the arteriotomy site was covered with a sterile dressing    PROCEDURES PERFORMED:    1. Four vessel diagnostic cerebral angiogram    CATHETERIZATION OF THE FOLLOWING VESSELS:    1. Right common carotid artery  2. Right subclavian artery  3. Left common carotid artery  4. Left subclavian artery    ANGIOGRAPHY AND INTERPRETATION OF THE FOLLOWING IMAGES:    1. Right common carotid artery - cervical station - oblique and lateral angiograms  2. Right common carotid artery -craniocervical station - PA oblique and lateral angiograms  3. Right subclavian artery - cervical station -PA angiograms  4. Right innominate artery -cervical station -PA and lateral angiograms  5. Left common carotid artery - cervical station - oblique and lateral angiograms  6. Left subclavian artery -cervical station - PA angiogram  7. Limited right common femoral artery - AP angiogram    INDICATIONS: Ms. Ray is a 85-year-old right-handed woman with known bilateral carotid artery stenosis.  A catheter cerebral angiogram is now being perfromed for senting. The purpose, alternative modalities, risks, and benefits of the procedure were discussed with the patient and her children. The patient was on plavix 75 mg and aspirin daily for the last several months. The patient signed consent for the procedure.    DESCRIPTION OF THE PROCEDURE AND FINDINGS: The patient was brought to the angiography suite and placed supine on the table. Both  and the venous phase is unremarkable.    The catheter was then positioned in the right subclavian artery under direct fluoroscopic visualization where PA angiograms from the cervical station was obtained. The images demonstrate atherosclerotic plaquing throughout the innominate and the origin of the right subclavian artery with mild stenosis at the origin of the right subclavian artery.  There is also minimal stenosis at the origin of the dominant right vertebral artery.    The catheter was then positioned in the left common carotid artery under fluoroscopic guidance. PA and lateral angiograms from the cervical station were perfromed. These angiograms demonstrate left ICA atherosclerotic plaque from the distal left common carotid artery extending into the right internal carotid artery and causing complete occlusion of the left external carotid artery.  There is 52% stenosis of the left internal carotid artery as calculated per NASCET criteria.  The patient was then positioned for the cranial station.  PA and lateral angiograms were performed.  This angiogram demonstrated atherosclerosis throughout the left internal carotid artery higher cervical, petrous and cavernous segments with mild stenosis.    The catheter was then moved to the left subclavian artery under direct fluoroscopic visualization where PA and lateral angiograms from the cervical station were obtained. The images demonstrate moderate stenosis at the origin of the codominant left vertebral artery calculated to be 45%.     The catheter was removed from the system. An angiogram of the right common femoral and external iliac arteries was performed to confirm that the sheath was not in the superficial femoral branch. The artery was found to be atherosclerotic and small. The sheath was removed and exchanged for a 6 Burmese short sheath. There were no acute complications and the patient tolerated the procedure well.    Dr. Bhatti was present during the

## 2025-05-23 NOTE — PROCEDURES
PROCEDURE NOTE  Date: 5/23/2025   Name: Marlene Ray  YOB: 1940    Procedures Carotid Angiogram with possible carotid stenting    0915 Verify consent, H&P, and/or immediate pre-procedure note completed  0915 Staff involved in the case: See intra procedure log  0918 Patient received in cath lab for procedure family taken to waiting room. Neuro assessment completed, see flowsheets  (Neuro assessments suspended while in procedure, vital signs, and pulse oximeter every 5 minutes documented in flow sheets  0920 Pre-procedure peripheral pulse,  right pedal Doppler, right posterior tibial Doppler, left pedal Doppler, left posterior tibial Doppler  0918 Patient prepped for procedure  0928 Procedure started with Dr. Bhatti.Timeout performed and the following information was verified: correct patient, correct procedure, correct procedure site, correct position, correct laterality (if applicable), procedure site marked and visible (if applicable), and consents verified.  Allergies reviewed.  Pertinent diagnostic and radiologic results present and relevant images available (if applicable).  All special equipment or special requirements available as applicable. Prophylactic antibiotics given as applicable.  Fire risk safety assessment completed, shared with team and appropriate interventions implemented.  0936 Lidocaine time  0938 Access obtained at Right femoral artery via 6Fr. Sheath Shuttle sheath  0947 Angiogram of the right common carotid  0953 Angiogram of the left common carotid  0959 Angiogram of the left subclavian for a nonselective of the left vertebral  1005 Angiogram of the right internal carotid  1006 Embosheild prepped and inserted   1011 Dr. Bhatti reviewing imaging  1020 Embosheild removed, undeployed  1023 Sheath to be sutured and left in place due to heparin given during procedure. Will be transduced with pressure line.  1024 Procedure completed; patient tolerated well.  1030 Femoral site; area  soft to touch with no bleeding noted. Hemostasis achieved.  1031 Post-procedure peripheral pulses unchanged. Femoral dressing remains dry and intact with area soft. Neuro assessment completed, see flowsheets  Last set of vitals with O2, see flowsheets  1025 Case end time  1039 Patient on bed, patient exits procedural suite  1040 Patient take to 2E16    Xray time- 15.3minutes   278.9mGy  Sedation time- 55minutes (First dose until procedure end time)  Total contrast- 65ml's  DAP-39.9

## 2025-05-23 NOTE — DISCHARGE INSTRUCTIONS
Discharge instructions for Cath: Groin Approach  1.  Take it easy for 3-4 days.  2.  No driving for 2 days.  3.  No lifting of 5 lbs or more for 5 days. - this is a gallon of milk.  4.  Can shower after 24 hours.  5.  Remove dressing in shower after 24 hours.  6.  Apply a clean band aid for 5 days over the incision with antibiotic ointment to site, then leave open.  7.  No creams, ointments, or powders near site.  8.  No bath tubs, swimming, or hot tubs for one week.  9.  Take stairs with unaffected side first.  10.  Watch for signs of infection - redness, increased pain, elevated temperature.  11.  If bleeding from incision, apply pressure and call 911.     Groin Care Instructions           Normal Observation: You may or may not experience these.    Soreness or tenderness that may last a few weeks.    Possible bruising that could last a few weeks and up to one month.   Formation of a small lump (dime to quarter size) that should last only a few weeks.     Care of your incision  You may shower 24 hours after the procedure. Wet the dressing thoroughly and gently remove the bandage from the hospital during showering. It is easier to remove this way.             Gently clean your site daily using soap and water while standing in the shower. Dry thoroughly.   Do not apply powders or lotions to the site for 2 weeks.   Keep the site clean and dry to prevent infection.    Do not sit in a bathtub or a pool of water for 7 days.    Inspect the site daily.     Activity   You may resume normal activity in 2 days, including driving, letting pain be your     guide.   Limit lifting over 5 pounds (half gallon of milk) to one week or until site heals.  Limit vigorous activity (contact sports) to two weeks time.  You will be able to return to work in 1-3 days.     Call our office immediately if you experience any of the following…  Significant bleeding does not stop after 10 minutes of applying firm pressure directly over incision.

## 2025-05-23 NOTE — PROGRESS NOTES
Returned to 2E16.  Monitor attached showing SR.  Arterial sheath in place to right groin, flushes easily.  Hemostasis maintained, no bleeding, swelling, or edema noted.  0.9NSS infusing with approx 700 ml remaining

## 2025-05-23 NOTE — PLAN OF CARE
S/p diagnostic cerebral angiogram, 5/23/2025 with Dr. Bhatti revealed:  Right ICA calcified atherosclerotic plaque with 42% stenosis calculated by NASCET criteria   The upper cervical segment of the right internal carotid as well as the petrous and cavernous and supraclinoid segment show atherosclerotic plaquing throughout with luminal irregularities causing multiple locations of mild stenosis throughout   Atherosclerotic plaque at the distal left common carotid artery extending into the left internal carotid artery and causing complete occlusion of the left external carotid artery.   There is 52% stenosis of the left internal carotid artery as calculated per NASCET criteria   Moderate stenosis at the origin of the codominant left vertebral artery calculated to be 45%      Patient is doing well post procedure. She is alert and oriented, speech is clear. She reports that she was having mild headache but it is better after eating. She denied any vision deficits, paresthesias, slurred speech, dysphagia, weakness, groin or leg pain. Her right groin site it soft; dressing is Dry and intact. Pulses are intact. She is stable for discharge as long as she remains stable.     Continue home asa, plavix and statin   NO follow up with Interventional Neurology is necessary at this time     This case was discussed with Dr. Bhatti and he is in agreement with the assessment and plan.    Electronically signed by XOCHILT MICHELLE CNP on 5/23/25 at 6:53 PM EDT

## 2025-05-23 NOTE — PROGRESS NOTES
Arterial sheath removed from right femoral artery, manual pressure and Quick-clot applied.  Hemostasis maintained.  No bleeding, swelling, or edema noted.   1455-Manual pressure continued  1500-Manual pressure continue  1505-Manual pressure released, Quick-clot remains in place.  Guaze and tegaderm applied, no bleeding, swelling, or edema noted.

## 2025-05-23 NOTE — PROGRESS NOTES
..  Left a voicemail message. Procedure is scheduled for 5/30/2025, admission time is 0900   Please arrive 15 minutes early  [facility]  NPO after midnight  Bring drivers license and insurance information  Wear comfortable clean clothes  Shower morning of and night before with liquid antibacterial soap  Remove jewelry   May have to stay overnight if have PTCA/stent - bring an overnight bag.  Leave valuables at home such as cash or credit cards  Bring medications in original bottles - do not take diabetic meds days of procedure.  It is OK to take BP and heart meds.  If you take ASA, plavix, effient or brilinta - please take AM of procedure  If your are on anticoagulants such as coumadin/warfarin, eliquis, xarelto or pradaxa - hold as directed by your Dr  Made aware of visitors limit to 2 at a time  Follow all instructions given by your physician  Please notify doctor office if you need to cancel or reschedule your procedure   needed at discharge

## 2025-05-23 NOTE — H&P
Interventional Neurology H&P    Date:5/23/2025       Room:Room/bed info not found  Patient Name:Marlene Ray     YOB: 1940     Age:85 y.o.    Requesting Physician: Leandro Bhatti MD     Reason for Consult:  Evaluate for left internal carotid stenosis      Chief Complaint: No chief complaint on file.      Subjective     Marlene Ray is a 85 y.o. female with a history of cigarette smoker, Severe aortic stenosis, coronary artery disease, and paroxysmal A-fib (not on OAC due to prior GI bleeding) who presented to [facility] on 5/23/2025 to undergo an elective diagnostic cerebral angiogram with possible left internal carotid artery stenting. CTA neck 4/28/25 demonstrated 71% stenosis of the right ICA and 75% stenosis of the left ICA. She reports that she has known about her carotid stenosis since 2012 and was previously being followed with carotid ultrasounds until her prior doctor retired. Based on chart review, previous carotid ultrasounds demonstrated 50-69% bilateral stenosis from 2012 - 2020. She recently underwent a LHC on 5/12/2025 with stent placed in the left circumflex. She reports that she has been complaint with her aspirin and plavix and last took it this morning. She denied any chest pain, palpitations, orthopnea or edema, dizziness, lightheadedness, headaches, focal weakness, vision disturbances, dysarthria, paresthesias or ataxia.  She reports that she does have some mild dysphagia for which she just finished speech therapy.  She reports a chronic cough that is unchanged as well as some mild chronic exertional shortness of breath.  She denied any recent fever or chills. The risks and benefits of the procedure were explained to the patient. Risks include, but are not limited to, stroke, hematoma, hemorrhage, infection, nephrotoxicity, allergic reaction, bleeding, and dissection.  Written informed consent was obtained.  We will proceed with diagnostic cerebral angiogram.

## 2025-05-24 NOTE — PLAN OF CARE
Problem: Discharge Planning  Goal: Discharge to home or other facility with appropriate resources  5/23/2025 2255 by Dorcas Pardo RN  Outcome: Progressing  5/23/2025 2255 by Dorcas Pardo RN  Outcome: Progressing     Problem: Pain  Goal: Verbalizes/displays adequate comfort level or baseline comfort level  5/23/2025 2255 by Dorcas Pardo RN  Outcome: Progressing  5/23/2025 2255 by Dorcas Pardo RN  Outcome: Progressing     Problem: Hematologic - Adult  Goal: Maintains hematologic stability  Outcome: Progressing

## 2025-05-30 ENCOUNTER — APPOINTMENT (OUTPATIENT)
Dept: CT IMAGING | Age: 85
DRG: 981 | End: 2025-05-30
Attending: INTERNAL MEDICINE
Payer: MEDICARE

## 2025-05-30 ENCOUNTER — HOSPITAL ENCOUNTER (INPATIENT)
Age: 85
LOS: 1 days | Discharge: HOME | DRG: 981 | End: 2025-05-31
Attending: INTERNAL MEDICINE | Admitting: INTERNAL MEDICINE
Payer: MEDICARE

## 2025-05-30 DIAGNOSIS — I65.23 BILATERAL CAROTID ARTERY STENOSIS: ICD-10-CM

## 2025-05-30 DIAGNOSIS — I25.10 CAD (CORONARY ARTERY DISEASE): ICD-10-CM

## 2025-05-30 DIAGNOSIS — I35.0 SEVERE AORTIC STENOSIS: ICD-10-CM

## 2025-05-30 LAB
ABO GROUP BLD: NORMAL
ACTIVATED CLOTTING TIME: 273 SECONDS (ref 1–150)
ANION GAP SERPL CALC-SCNC: 9 MEQ/L (ref 8–16)
BUN SERPL-MCNC: 22 MG/DL (ref 8–23)
CALCIUM SERPL-MCNC: 9.9 MG/DL (ref 8.8–10.2)
CHLORIDE SERPL-SCNC: 103 MEQ/L (ref 98–111)
CO2 SERPL-SCNC: 26 MEQ/L (ref 22–29)
CREAT SERPL-MCNC: 1 MG/DL (ref 0.5–0.9)
DEPRECATED RDW RBC AUTO: 60.1 FL (ref 35–45)
EKG ATRIAL RATE: 62 BPM
EKG ATRIAL RATE: 79 BPM
EKG P AXIS: 61 DEGREES
EKG P AXIS: 63 DEGREES
EKG P-R INTERVAL: 164 MS
EKG P-R INTERVAL: 168 MS
EKG Q-T INTERVAL: 388 MS
EKG Q-T INTERVAL: 390 MS
EKG QRS DURATION: 78 MS
EKG QRS DURATION: 78 MS
EKG QTC CALCULATION (BAZETT): 395 MS
EKG QTC CALCULATION (BAZETT): 444 MS
EKG R AXIS: -16 DEGREES
EKG R AXIS: -24 DEGREES
EKG T AXIS: 77 DEGREES
EKG T AXIS: 82 DEGREES
EKG VENTRICULAR RATE: 62 BPM
EKG VENTRICULAR RATE: 79 BPM
ERYTHROCYTE [DISTWIDTH] IN BLOOD BY AUTOMATED COUNT: 17 % (ref 11.5–14.5)
GFR SERPL CREATININE-BSD FRML MDRD: 55 ML/MIN/1.73M2
GLUCOSE SERPL-MCNC: 103 MG/DL (ref 74–109)
HCT VFR BLD AUTO: 34.6 % (ref 37–47)
HGB BLD-MCNC: 11 GM/DL (ref 12–16)
IAT IGG-SP REAG SERPL QL: NORMAL
INR PPP: 1.02 (ref 0.85–1.13)
MCH RBC QN AUTO: 30.3 PG (ref 26–33)
MCHC RBC AUTO-ENTMCNC: 31.8 GM/DL (ref 32.2–35.5)
MCV RBC AUTO: 95.3 FL (ref 81–99)
PLATELET # BLD AUTO: 242 THOU/MM3 (ref 130–400)
PMV BLD AUTO: 9.8 FL (ref 9.4–12.4)
POTASSIUM SERPL-SCNC: 4.9 MEQ/L (ref 3.5–5.2)
RBC # BLD AUTO: 3.63 MILL/MM3 (ref 4.2–5.4)
RH BLD: NORMAL
SODIUM SERPL-SCNC: 138 MEQ/L (ref 135–145)
WBC # BLD AUTO: 6.4 THOU/MM3 (ref 4.8–10.8)

## 2025-05-30 PROCEDURE — 93005 ELECTROCARDIOGRAM TRACING: CPT | Performed by: INTERNAL MEDICINE

## 2025-05-30 PROCEDURE — C1894 INTRO/SHEATH, NON-LASER: HCPCS | Performed by: INTERNAL MEDICINE

## 2025-05-30 PROCEDURE — 2580000003 HC RX 258: Performed by: INTERNAL MEDICINE

## 2025-05-30 PROCEDURE — C1876 STENT, NON-COA/NON-COV W/DEL: HCPCS | Performed by: INTERNAL MEDICINE

## 2025-05-30 PROCEDURE — 37221 PR REVSC OPN/PRQ ILIAC ART W/STNT PLMT & ANGIOPLSTY: CPT | Performed by: INTERNAL MEDICINE

## 2025-05-30 PROCEDURE — 86901 BLOOD TYPING SEROLOGIC RH(D): CPT

## 2025-05-30 PROCEDURE — C1725 CATH, TRANSLUMIN NON-LASER: HCPCS | Performed by: INTERNAL MEDICINE

## 2025-05-30 PROCEDURE — 85347 COAGULATION TIME ACTIVATED: CPT

## 2025-05-30 PROCEDURE — 37221 HC ILIAC TERRITORY PLASTY STENT: CPT | Performed by: INTERNAL MEDICINE

## 2025-05-30 PROCEDURE — 80048 BASIC METABOLIC PNL TOTAL CA: CPT

## 2025-05-30 PROCEDURE — 85610 PROTHROMBIN TIME: CPT

## 2025-05-30 PROCEDURE — 74174 CTA ABD&PLVS W/CONTRAST: CPT

## 2025-05-30 PROCEDURE — G0278 ILIAC ART ANGIO,CARDIAC CATH: HCPCS | Performed by: INTERNAL MEDICINE

## 2025-05-30 PROCEDURE — 85027 COMPLETE CBC AUTOMATED: CPT

## 2025-05-30 PROCEDURE — 7100000011 HC PHASE II RECOVERY - ADDTL 15 MIN: Performed by: INTERNAL MEDICINE

## 2025-05-30 PROCEDURE — G0269 OCCLUSIVE DEVICE IN VEIN ART: HCPCS | Performed by: INTERNAL MEDICINE

## 2025-05-30 PROCEDURE — 6360000002 HC RX W HCPCS: Performed by: INTERNAL MEDICINE

## 2025-05-30 PROCEDURE — C1874 STENT, COATED/COV W/DEL SYS: HCPCS | Performed by: INTERNAL MEDICINE

## 2025-05-30 PROCEDURE — C1769 GUIDE WIRE: HCPCS | Performed by: INTERNAL MEDICINE

## 2025-05-30 PROCEDURE — 4A023N7 MEASUREMENT OF CARDIAC SAMPLING AND PRESSURE, LEFT HEART, PERCUTANEOUS APPROACH: ICD-10-PCS | Performed by: INTERNAL MEDICINE

## 2025-05-30 PROCEDURE — 6360000004 HC RX CONTRAST MEDICATION: Performed by: INTERNAL MEDICINE

## 2025-05-30 PROCEDURE — 2500000003 HC RX 250 WO HCPCS: Performed by: INTERNAL MEDICINE

## 2025-05-30 PROCEDURE — 92928 PRQ TCAT PLMT NTRAC ST 1 LES: CPT | Performed by: INTERNAL MEDICINE

## 2025-05-30 PROCEDURE — 36415 COLL VENOUS BLD VENIPUNCTURE: CPT

## 2025-05-30 PROCEDURE — 7100000010 HC PHASE II RECOVERY - FIRST 15 MIN: Performed by: INTERNAL MEDICINE

## 2025-05-30 PROCEDURE — 99152 MOD SED SAME PHYS/QHP 5/>YRS: CPT | Performed by: INTERNAL MEDICINE

## 2025-05-30 PROCEDURE — 2000000000 HC ICU R&B

## 2025-05-30 PROCEDURE — 86885 COOMBS TEST INDIRECT QUAL: CPT

## 2025-05-30 PROCEDURE — 93567 NJX CAR CTH SPRVLV AORTGRPHY: CPT | Performed by: INTERNAL MEDICINE

## 2025-05-30 PROCEDURE — 027034Z DILATION OF CORONARY ARTERY, ONE ARTERY WITH DRUG-ELUTING INTRALUMINAL DEVICE, PERCUTANEOUS APPROACH: ICD-10-PCS | Performed by: INTERNAL MEDICINE

## 2025-05-30 PROCEDURE — 99153 MOD SED SAME PHYS/QHP EA: CPT | Performed by: INTERNAL MEDICINE

## 2025-05-30 PROCEDURE — 71275 CT ANGIOGRAPHY CHEST: CPT

## 2025-05-30 PROCEDURE — C1887 CATHETER, GUIDING: HCPCS | Performed by: INTERNAL MEDICINE

## 2025-05-30 PROCEDURE — C9600 PERC DRUG-EL COR STENT SING: HCPCS | Performed by: INTERNAL MEDICINE

## 2025-05-30 PROCEDURE — C1760 CLOSURE DEV, VASC: HCPCS | Performed by: INTERNAL MEDICINE

## 2025-05-30 PROCEDURE — 6370000000 HC RX 637 (ALT 250 FOR IP): Performed by: INTERNAL MEDICINE

## 2025-05-30 PROCEDURE — 86900 BLOOD TYPING SEROLOGIC ABO: CPT

## 2025-05-30 PROCEDURE — 2709999900 HC NON-CHARGEABLE SUPPLY: Performed by: INTERNAL MEDICINE

## 2025-05-30 DEVICE — ANGIO-SEAL VIP VASCULAR CLOSURE DEVICE
Type: IMPLANTABLE DEVICE | Status: FUNCTIONAL
Brand: ANGIO-SEAL

## 2025-05-30 DEVICE — STENT ONYXNG30018UX ONYX 3.00X18RX
Type: IMPLANTABLE DEVICE | Status: FUNCTIONAL
Brand: ONYX FRONTIER™

## 2025-05-30 RX ORDER — FUROSEMIDE 20 MG/1
20 TABLET ORAL DAILY
Status: DISCONTINUED | OUTPATIENT
Start: 2025-05-30 | End: 2025-05-31 | Stop reason: HOSPADM

## 2025-05-30 RX ORDER — IOPAMIDOL 755 MG/ML
80 INJECTION, SOLUTION INTRAVASCULAR
Status: COMPLETED | OUTPATIENT
Start: 2025-05-30 | End: 2025-05-30

## 2025-05-30 RX ORDER — FENTANYL CITRATE 50 UG/ML
INJECTION, SOLUTION INTRAMUSCULAR; INTRAVENOUS PRN
Status: DISCONTINUED | OUTPATIENT
Start: 2025-05-30 | End: 2025-05-30 | Stop reason: HOSPADM

## 2025-05-30 RX ORDER — POTASSIUM CITRATE 1080 MG/1
10 TABLET, EXTENDED RELEASE ORAL 2 TIMES DAILY
Status: DISCONTINUED | OUTPATIENT
Start: 2025-05-30 | End: 2025-05-31 | Stop reason: HOSPADM

## 2025-05-30 RX ORDER — VITAMIN E 268 MG
400 CAPSULE ORAL DAILY
Status: DISCONTINUED | OUTPATIENT
Start: 2025-05-30 | End: 2025-05-31 | Stop reason: HOSPADM

## 2025-05-30 RX ORDER — SODIUM CHLORIDE 0.9 % (FLUSH) 0.9 %
5-40 SYRINGE (ML) INJECTION PRN
Status: DISCONTINUED | OUTPATIENT
Start: 2025-05-30 | End: 2025-05-31 | Stop reason: HOSPADM

## 2025-05-30 RX ORDER — ASPIRIN 325 MG
325 TABLET ORAL ONCE
Status: DISCONTINUED | OUTPATIENT
Start: 2025-05-30 | End: 2025-05-30 | Stop reason: HOSPADM

## 2025-05-30 RX ORDER — MAGNESIUM GLYCINATE 100 MG
2 CAPSULE ORAL 2 TIMES DAILY
Status: DISCONTINUED | OUTPATIENT
Start: 2025-05-30 | End: 2025-05-30 | Stop reason: RX

## 2025-05-30 RX ORDER — SODIUM CHLORIDE 9 MG/ML
INJECTION, SOLUTION INTRAVENOUS CONTINUOUS
Status: DISCONTINUED | OUTPATIENT
Start: 2025-05-30 | End: 2025-05-30 | Stop reason: HOSPADM

## 2025-05-30 RX ORDER — METOPROLOL SUCCINATE 50 MG/1
50 TABLET, EXTENDED RELEASE ORAL DAILY
Status: DISCONTINUED | OUTPATIENT
Start: 2025-05-31 | End: 2025-05-31 | Stop reason: HOSPADM

## 2025-05-30 RX ORDER — NITROGLYCERIN 0.4 MG/1
0.4 TABLET SUBLINGUAL EVERY 5 MIN PRN
Status: DISCONTINUED | OUTPATIENT
Start: 2025-05-30 | End: 2025-05-30 | Stop reason: HOSPADM

## 2025-05-30 RX ORDER — ASPIRIN 81 MG/1
81 TABLET, CHEWABLE ORAL DAILY
Status: DISCONTINUED | OUTPATIENT
Start: 2025-05-31 | End: 2025-05-31 | Stop reason: HOSPADM

## 2025-05-30 RX ORDER — FERROUS SULFATE 325(65) MG
325 TABLET ORAL DAILY
Status: DISCONTINUED | OUTPATIENT
Start: 2025-05-31 | End: 2025-05-31 | Stop reason: HOSPADM

## 2025-05-30 RX ORDER — MIDAZOLAM HYDROCHLORIDE 1 MG/ML
INJECTION, SOLUTION INTRAMUSCULAR; INTRAVENOUS PRN
Status: DISCONTINUED | OUTPATIENT
Start: 2025-05-30 | End: 2025-05-30 | Stop reason: HOSPADM

## 2025-05-30 RX ORDER — CLOPIDOGREL BISULFATE 75 MG/1
75 TABLET ORAL DAILY
Status: DISCONTINUED | OUTPATIENT
Start: 2025-05-31 | End: 2025-05-30 | Stop reason: SDUPTHER

## 2025-05-30 RX ORDER — AMLODIPINE BESYLATE 2.5 MG/1
2.5 TABLET ORAL DAILY
Status: DISCONTINUED | OUTPATIENT
Start: 2025-05-31 | End: 2025-05-31 | Stop reason: HOSPADM

## 2025-05-30 RX ORDER — SODIUM CHLORIDE 9 MG/ML
INJECTION, SOLUTION INTRAVENOUS PRN
Status: DISCONTINUED | OUTPATIENT
Start: 2025-05-30 | End: 2025-05-31 | Stop reason: HOSPADM

## 2025-05-30 RX ORDER — HYDRALAZINE HYDROCHLORIDE 20 MG/ML
INJECTION INTRAMUSCULAR; INTRAVENOUS PRN
Status: DISCONTINUED | OUTPATIENT
Start: 2025-05-30 | End: 2025-05-30 | Stop reason: HOSPADM

## 2025-05-30 RX ORDER — ASCORBIC ACID 500 MG
1000 TABLET ORAL DAILY
Status: DISCONTINUED | OUTPATIENT
Start: 2025-05-30 | End: 2025-05-31 | Stop reason: HOSPADM

## 2025-05-30 RX ORDER — SODIUM CHLORIDE 0.9 % (FLUSH) 0.9 %
5-40 SYRINGE (ML) INJECTION EVERY 12 HOURS SCHEDULED
Status: DISCONTINUED | OUTPATIENT
Start: 2025-05-30 | End: 2025-05-31 | Stop reason: HOSPADM

## 2025-05-30 RX ORDER — CLOPIDOGREL BISULFATE 75 MG/1
75 TABLET ORAL DAILY
Status: DISCONTINUED | OUTPATIENT
Start: 2025-05-31 | End: 2025-05-31

## 2025-05-30 RX ORDER — ONDANSETRON 2 MG/ML
INJECTION INTRAMUSCULAR; INTRAVENOUS PRN
Status: DISCONTINUED | OUTPATIENT
Start: 2025-05-30 | End: 2025-05-30 | Stop reason: HOSPADM

## 2025-05-30 RX ORDER — ASPIRIN 81 MG/1
81 TABLET, CHEWABLE ORAL DAILY
Status: DISCONTINUED | OUTPATIENT
Start: 2025-05-31 | End: 2025-05-30 | Stop reason: SDUPTHER

## 2025-05-30 RX ORDER — LISINOPRIL 20 MG/1
20 TABLET ORAL DAILY
Status: DISCONTINUED | OUTPATIENT
Start: 2025-05-30 | End: 2025-05-31 | Stop reason: HOSPADM

## 2025-05-30 RX ORDER — ACETAMINOPHEN 325 MG/1
650 TABLET ORAL EVERY 4 HOURS PRN
Status: DISCONTINUED | OUTPATIENT
Start: 2025-05-30 | End: 2025-05-31 | Stop reason: HOSPADM

## 2025-05-30 RX ORDER — IOPAMIDOL 755 MG/ML
INJECTION, SOLUTION INTRAVASCULAR PRN
Status: DISCONTINUED | OUTPATIENT
Start: 2025-05-30 | End: 2025-05-30 | Stop reason: HOSPADM

## 2025-05-30 RX ORDER — HEPARIN SODIUM 1000 [USP'U]/ML
INJECTION, SOLUTION INTRAVENOUS; SUBCUTANEOUS PRN
Status: DISCONTINUED | OUTPATIENT
Start: 2025-05-30 | End: 2025-05-30 | Stop reason: HOSPADM

## 2025-05-30 RX ORDER — ATORVASTATIN CALCIUM 80 MG/1
80 TABLET, FILM COATED ORAL EVERY EVENING
Status: DISCONTINUED | OUTPATIENT
Start: 2025-05-31 | End: 2025-05-31 | Stop reason: HOSPADM

## 2025-05-30 RX ADMIN — POTASSIUM CITRATE 10 MEQ: 10 TABLET ORAL at 20:53

## 2025-05-30 RX ADMIN — Medication 3 MG: at 23:25

## 2025-05-30 RX ADMIN — SODIUM CHLORIDE: 0.9 INJECTION, SOLUTION INTRAVENOUS at 10:01

## 2025-05-30 RX ADMIN — IOPAMIDOL 80 ML: 755 INJECTION, SOLUTION INTRAVENOUS at 14:04

## 2025-05-30 RX ADMIN — SODIUM CHLORIDE, PRESERVATIVE FREE 10 ML: 5 INJECTION INTRAVENOUS at 20:53

## 2025-05-30 NOTE — CONSULTS
CRITICAL CARE CONSULT NOTE    Patient:   Marlene Ray    Unit/Bed: 4D-01/001-A  YOB: 1940  MRN:  [id number]   PCP:  Emanuel Michael,   Date of Admission:  5/30/2025    Assessment and Plan:   Ischemic cardiomyopathy, CAD: S/p LHC with PCI RCA 5/30.  Previous LHC 5/12/2025 with LCx lithotripsy then ALEXANDRA.  Cardiology consulted.  Aspirin 81 Mg p.o. qd  Plavix 75 mg p.o. qd  Toprol 50 mg p.o. qd  Atorvastatin 80 mg p.o. qhs  Telemetry, strict I/O, daily weight  Acute postoperative pulmonary insufficiency.  Patient requiring nasal cannula supplemental oxygen to maintain SPO2 > 90%.  Chest x-ray 5/31  Wean supplemental oxygen as tolerated, target SPO2 >90%.  Right iliac artery dissection, small.  No additional radiographic information available aside from Dr. Car vascular surgeon note 5/30/2025.  S/p stenting.  On DAPT.  Severe aortic stenosis, symptomatic: 2/6 systolic ejection murmur on exam.  Being worked up for TAVR.  Past echo [facility] 3/2025 EF 60%, severe aortic stenosis.  Right ICA atherosclerosis: 5/23/2025 diagnostic cerebral angiogram 42% stenosis right, 52% stenosis left.  On Aspirin, Plavix, statin.  Paroxysmal atrial fibrillation. Formerly on warfarin, stopped due to GIB 2/2025. On Toprol.  Hypertension: On amlodipine 2.5 Mg p.o. qd, Toprol 50 mg p.o. qd, lisinopril 20 mg p.o. qd.  Diastolic heart failure, suspected: No echo with diastolic dysfunction in EMR available for review.  Diagnosis noted historically on chart review.  On home lisinopril, Toprol.  No MRA, SGLT2i.  On home Lasix 20 mg p.o. qd, potassium 10 mEq p.o. bid.  Tobacco use.    Chief Complaint:  Elective LHC.    History of Present Illness:  Patient is 85F with history of cigarette smoking, severe aortic stenosis, CAD, hypertension, paroxysmal atrial fibrillation formerly on warfarin now stopped due to past GI bleeding 2/28/2025.  She has been following with cardiology outpatient, and is s/p elective outpatient  Decreased breath sounds, otherwise clear.  No retractions  Cardiac: RRR.  No overt JVD.  Grade 2/6 ejection murmur.  Abdomen: soft.  Nontender.  Extremities:  No clubbing, cyanosis, or edema x 4.    Vasculature: capillary refill < 3 seconds. Palpable dorsalis pedis pulses.  Skin:  warm and dry. Dressing medial right femoral triangle, no gross hematoma.  Psych:  Alert and oriented x2.  Affect appropriate  Lymph:  No supraclavicular adenopathy.  Neurologic:  No focal deficit. No seizures.    Data: (All radiographs, tracings, PFTs, and imaging are personally viewed and interpreted unless otherwise noted).     Sodium 130, potassium 4.9, bicarb 26, BUN 22, creatinine 1.0, EGFR 55.  Calcium 9.9  WBC 6.4, hemoglobin 11.0, MCV 95.3 WNL, platelet 242.  INR 1.2.  CTA chest, abdomen, pelvis with and without contrast: Extensive soft and calcific atherosclerotic plaques throughout aorta and branches with no significant aneurysmal dilation or dissection,    CC time 35 minutes.  This time was discontiguous, and does not include procedures.  Electronically signed by ADAM Jolley PGY-2, on 5/30/2025.

## 2025-05-30 NOTE — BRIEF OP NOTE
[facility]  Sedation/Analgesia Post Sedation Record    Pt Name: Marlene Ray  Account number: [id number]  MRN: [id number]  YOB: 1940  Procedure Performed By: Raffi Rodriguez MD MD FACC, FSCAI, RPVI  Primary Care Physician: Emanuel Michael,   Date: 5/30/2025    POST-PROCEDURE    Physicians/Assistants: Raffi Rodriguez MD, GHAZAL, [facility], RPVI    Procedure Performed: PCI    Sedation/Anesthesia: Versed/ Fentanyl and 2% xylocaine local anesthesia.      Estimated Blood Loss: < 50 ml.     Specimens Removed: None         Disposition of Specimen: N/A        Complications: No Immediate Complications.       Post-procedure Diagnosis/Findings:       PCI of RCA x 1 ALEXANDRA  DAPT  GDMT for CAD        Electronically signed by Raffi Rodriguez MD on 5/30/25 at 12:31 PM EDT   Interventional Cardiology

## 2025-05-30 NOTE — PROCEDURES
PROCEDURE NOTE  Date: 5/30/2025   Name: Marlene Ray  YOB: 1940    Procedures  EKG completed

## 2025-05-30 NOTE — CONSULTS
Patient is a 85-year-old lady this early afternoon underwent cardiac catheterization with stenting of the ostial right coronary artery.  During the procedure table back pain.  There was concern for possible dissection.  Dr. Rodriguez asked that he present.  He did arteriography from the ascending aorta through the descending aorta and no obvious dissection was noted.  There was a small dissection of the right iliac artery which was successfully stented.  Patient was then taken to the CT scanner underwent CTA of the chest abdomen pelvis.  Dr. Drake has looked at the pictures and does not see any dissection in the ascending, arch, or descending aorta.  Dr. Rodriguez was notified of the findings.  Patient is now in the ICU and resting comfortably.  I have also notified the ICU team with the findings.  We will follow peripherally.

## 2025-05-30 NOTE — PROGRESS NOTES
Patient admitted to 2E  Ambulatory for PCI.  Patient NPO. Patient accompanied by family.  Vital signs obtained.   Assessment and data collection intiated.   Oriented to room.  Policies and procedures for 2E explained.   All questions answered with no further questions at this time.   Fall prevention and safety precautions discussed with patient.

## 2025-05-30 NOTE — H&P
[facility]  Sedation/Analgesia History & Physical    Pt Name: Marlene Ray  Account number: [id number]  MRN: [id number]  YOB: 1940  Provider Performing Procedure: Raffi Rodriguez MD MD  Referring Provider: Raffi Rodriguez MD   Primary Care Physician: Emanuel Michael DO  Date: 5/30/2025    PRE-PROCEDURE    Code Status: FULL CODE  Brief History/Pre-Procedure Diagnosis:   Ostial RCA  Severe AS  CH    Consent: : I have discussed with the patient risks, benefits, and alternatives (and relevant risks, benefits, and side effects related to alternatives or not receiving care), and likelihood of the success.   The patient and/or representative appear to understand and agree to proceed.  The discussion encompasses risks, benefits, and side effects related to the alternatives and the risks related to not receiving the proposed care, treatment, and services.     The indication, risks and benefits of the procedure and possible therapeutic consequences and alternatives were discussed with the patient. The patient was given the opportunity to ask questions and to have them answered to his/her satisfaction. Risks of the procedure include but are not limited to the following: Bleeding, hematoma including retroperitoneal hemmorhage, infection, pain and discomfort, injury to the aorta and other blood vessels, rhythm disturbance, low blood pressure, myocardial infarction, stroke, kidney damage/failure, myocardial perforation, allergic reactions to sedatives/contrast material, loss of pulse/vascular compromise requiring surgery, aneurysm/pseudoaneurysm formation, possible loss of a limb/hand/leg, needing blood transfusion, requiring emergent open heart surgery or emergent coronary intervention, the need for intubation/respiratory support, the requirement for defibrillation/cardioversion, and death. Alternatives to and omission of the suggested procedure were discussed. The patient had no further  Vitamins-Minerals (LUTEIN-ZEAXANTHIN PO) Take by mouth daily Lutein 10 mg Zeaxanthin 4 mg   Yes Rodrigo Marcial MD   Omega-3 Fatty Acids (OMEGA 3 PO) Take 1,360 mg by mouth 2 times daily   Yes Rodrigo Marcial MD   CALCIUM-MAGNESIUM PO Take by mouth 2 times daily   Yes Rodrigo Marcial MD   ECHINACEA PO Take 1,200 mg by mouth once a week   Yes Rodrigo Marcial MD   melatonin 1 MG tablet Take 1 tablet by mouth nightly as needed for Sleep   Yes Rodrigo Marcial MD   Carnitine-B5-B6 (L-CARNITINE 500 PO) Take by mouth every other day   Yes Rodrigo Marcial MD   L-ARGININE-500 PO Take by mouth every other day   Yes Rodrigo Marcial MD   Linoleic Acid Conjugated 1000 MG CAPS Take by mouth every other day   Yes Rodrigo Marcial MD   Deanol Bitartrate (DMAE BITARTRATE) POWD Take by mouth every other day   Yes Rodrigo Marcial MD   Alpha-Lipoic Acid 300 MG CAPS Take by mouth every other day   Yes Rodrigo Marcial MD   Lycopene 5 MG CAPS Take by mouth every other day   Yes Rodrigo Marcial MD   Ginkgo Biloba (GINKOBA PO) Take 60 mg by mouth every other day   Yes Rodrigo Marcial MD   Ginger 500 MG CAPS Take by mouth every other day   Yes Rodrigo Marcial MD   nicotine (NICODERM CQ) 21 MG/24HR Place 1 patch onto the skin daily  Patient not taking: Reported on 5/20/2025 4/10/25 5/22/25  Raffi Rodriguez MD     Additional information:       VITAL SIGNS   Vitals:    05/30/25 0911   BP: (!) 129/46   Pulse: 62   Resp: 15   Temp: 97.6 °F (36.4 °C)   SpO2: 97%       PHYSICAL:   General: No acute distress  HEENT:  Unremarkable for age  Neck: without increased JVD, carotid pulses 2+ bilaterally without bruits  Heart: RRR, S1 & S2 WNL, S4 gallop, 3/6 SHARA  NYHA: 2  Lungs: Clear to auscultation    Abdomen: BS present, without HSM, masses, or tenderness    Extremities: without C,C,E.  Pulses 2+ bilaterally  Mental Status: Alert & Oriented        PLANNED

## 2025-05-31 ENCOUNTER — APPOINTMENT (OUTPATIENT)
Dept: GENERAL RADIOLOGY | Age: 85
DRG: 981 | End: 2025-05-31
Attending: INTERNAL MEDICINE
Payer: MEDICARE

## 2025-05-31 VITALS
HEIGHT: 64 IN | RESPIRATION RATE: 17 BRPM | WEIGHT: 132.8 LBS | OXYGEN SATURATION: 95 % | DIASTOLIC BLOOD PRESSURE: 46 MMHG | BODY MASS INDEX: 22.67 KG/M2 | SYSTOLIC BLOOD PRESSURE: 121 MMHG | TEMPERATURE: 97 F | HEART RATE: 73 BPM

## 2025-05-31 PROBLEM — J98.4 PULMONARY INSUFFICIENCY: Status: ACTIVE | Noted: 2025-05-31

## 2025-05-31 LAB
ANION GAP SERPL CALC-SCNC: 11 MEQ/L (ref 8–16)
BUN SERPL-MCNC: 22 MG/DL (ref 8–23)
CALCIUM SERPL-MCNC: 8.9 MG/DL (ref 8.8–10.2)
CHLORIDE SERPL-SCNC: 102 MEQ/L (ref 98–111)
CHOLEST SERPL-MCNC: 130 MG/DL (ref 100–199)
CO2 SERPL-SCNC: 23 MEQ/L (ref 22–29)
CREAT SERPL-MCNC: 1 MG/DL (ref 0.5–0.9)
DEPRECATED RDW RBC AUTO: 59.5 FL (ref 35–45)
ERYTHROCYTE [DISTWIDTH] IN BLOOD BY AUTOMATED COUNT: 17.6 % (ref 11.5–14.5)
GFR SERPL CREATININE-BSD FRML MDRD: 55 ML/MIN/1.73M2
GLUCOSE SERPL-MCNC: 97 MG/DL (ref 74–109)
HCT VFR BLD AUTO: 32.7 % (ref 37–47)
HDLC SERPL-MCNC: 75 MG/DL
HGB BLD-MCNC: 10.7 GM/DL (ref 12–16)
KCT BLD-ACNC: 135 SECONDS (ref 1–150)
LDLC SERPL CALC-MCNC: 44 MG/DL
MCH RBC QN AUTO: 30.4 PG (ref 26–33)
MCHC RBC AUTO-ENTMCNC: 32.7 GM/DL (ref 32.2–35.5)
MCV RBC AUTO: 92.9 FL (ref 81–99)
PLATELET # BLD AUTO: 229 THOU/MM3 (ref 130–400)
PMV BLD AUTO: 9.7 FL (ref 9.4–12.4)
POTASSIUM SERPL-SCNC: 4.3 MEQ/L (ref 3.5–5.2)
RBC # BLD AUTO: 3.52 MILL/MM3 (ref 4.2–5.4)
SODIUM SERPL-SCNC: 136 MEQ/L (ref 135–145)
TRIGL SERPL-MCNC: 55 MG/DL (ref 0–199)
WBC # BLD AUTO: 7.5 THOU/MM3 (ref 4.8–10.8)

## 2025-05-31 PROCEDURE — 2580000003 HC RX 258

## 2025-05-31 PROCEDURE — 80061 LIPID PANEL: CPT

## 2025-05-31 PROCEDURE — 85347 COAGULATION TIME ACTIVATED: CPT

## 2025-05-31 PROCEDURE — 6370000000 HC RX 637 (ALT 250 FOR IP): Performed by: INTERNAL MEDICINE

## 2025-05-31 PROCEDURE — 85027 COMPLETE CBC AUTOMATED: CPT

## 2025-05-31 PROCEDURE — 36415 COLL VENOUS BLD VENIPUNCTURE: CPT

## 2025-05-31 PROCEDURE — 80048 BASIC METABOLIC PNL TOTAL CA: CPT

## 2025-05-31 PROCEDURE — 71045 X-RAY EXAM CHEST 1 VIEW: CPT

## 2025-05-31 PROCEDURE — 96361 HYDRATE IV INFUSION ADD-ON: CPT

## 2025-05-31 PROCEDURE — 99232 SBSQ HOSP IP/OBS MODERATE 35: CPT | Performed by: SURGERY

## 2025-05-31 PROCEDURE — 96360 HYDRATION IV INFUSION INIT: CPT

## 2025-05-31 PROCEDURE — G0378 HOSPITAL OBSERVATION PER HR: HCPCS

## 2025-05-31 RX ORDER — CLOPIDOGREL BISULFATE 75 MG/1
300 TABLET ORAL ONCE
Status: COMPLETED | OUTPATIENT
Start: 2025-05-31 | End: 2025-05-31

## 2025-05-31 RX ORDER — CLOPIDOGREL BISULFATE 75 MG/1
75 TABLET ORAL DAILY
Status: DISCONTINUED | OUTPATIENT
Start: 2025-06-01 | End: 2025-05-31 | Stop reason: HOSPADM

## 2025-05-31 RX ORDER — SODIUM CHLORIDE 9 MG/ML
INJECTION, SOLUTION INTRAVENOUS CONTINUOUS
Status: DISCONTINUED | OUTPATIENT
Start: 2025-05-31 | End: 2025-05-31 | Stop reason: HOSPADM

## 2025-05-31 RX ORDER — ATROPINE SULFATE 0.1 MG/ML
INJECTION INTRAVENOUS
Status: DISCONTINUED
Start: 2025-05-31 | End: 2025-05-31 | Stop reason: WASHOUT

## 2025-05-31 RX ORDER — 0.9 % SODIUM CHLORIDE 0.9 %
500 INTRAVENOUS SOLUTION INTRAVENOUS ONCE
Status: COMPLETED | OUTPATIENT
Start: 2025-05-31 | End: 2025-05-31

## 2025-05-31 RX ORDER — NITROGLYCERIN 0.4 MG/1
0.4 TABLET SUBLINGUAL EVERY 5 MIN PRN
Qty: 25 TABLET | Refills: 3 | Status: SHIPPED | OUTPATIENT
Start: 2025-05-31 | End: 2025-05-31 | Stop reason: HOSPADM

## 2025-05-31 RX ADMIN — FERROUS SULFATE TAB 325 MG (65 MG ELEMENTAL FE) 325 MG: 325 (65 FE) TAB at 10:15

## 2025-05-31 RX ADMIN — OXYCODONE HYDROCHLORIDE AND ACETAMINOPHEN 1000 MG: 500 TABLET ORAL at 10:15

## 2025-05-31 RX ADMIN — ASPIRIN 81 MG: 81 TABLET, CHEWABLE ORAL at 10:16

## 2025-05-31 RX ADMIN — SODIUM CHLORIDE 500 ML: 0.9 INJECTION, SOLUTION INTRAVENOUS at 09:05

## 2025-05-31 RX ADMIN — CLOPIDOGREL BISULFATE 300 MG: 75 TABLET, FILM COATED ORAL at 10:15

## 2025-05-31 RX ADMIN — Medication 400 UNITS: at 10:15

## 2025-05-31 RX ADMIN — SODIUM CHLORIDE: 9 INJECTION, SOLUTION INTRAVENOUS at 09:15

## 2025-05-31 NOTE — DISCHARGE INSTRUCTIONS
Do not stop aspirin or plavix without talking to your doctor.     Normal Observation: You may or may not experience these.    Soreness or tenderness that may last a few weeks.    Possible bruising that could last a few weeks and up to one month.   Formation of a small lump (dime to quarter size) that should last only a few weeks.    Care of your incision  You may shower 24 hours after the procedure. Wet the dressing thoroughly and gently remove the bandage from the hospital during showering. It is easier to remove this way.             Gently clean your site daily using soap and water while standing in the shower. Dry thoroughly.   Do not apply powders or lotions to the site for 2 weeks.   Keep the site clean and dry to prevent infection.    Do not sit in a bathtub or a pool of water for 7 days.    Inspect the site daily.    Activity   You may resume normal activity in 2 days, including driving, letting pain be your     guide.   Limit lifting over 5 pounds (half gallon of milk) to one week or until site heals.  Limit vigorous activity (contact sports) to two weeks time.  You will be able to return to work in 1-3 days.    Call our office immediately if you experience any of the following…  Significant bleeding does not stop after 10 minutes of applying firm pressure directly over incision.   Increased swelling of groin or leg.   Unusual pain at groin or down that leg.   Signs of infection: redness, warmth to touch, drainage, poorly healing incision, fever, or chills.

## 2025-05-31 NOTE — DISCHARGE SUMMARY
Cardiology Discharge Summary      Patient Identification:  Marlene Ray  : 1940  MRN: [id number]   Account: [id number]     Admit date: 2025  Discharge date: 2025     Attending provider: Raffi Rodriguez MD        Primary care provider: Emanuel Michael,      Admission Diagnoses:  CAD (coronary artery disease)         Discharge Diagnoses:   Principal Problem:    CAD (coronary artery disease)  Active Problems:    Severe aortic stenosis    Pulmonary insufficiency  Resolved Problems:    * No resolved hospital problems. *         Hospital Course:   Marlene Ray is a 85 y.o. female admitted to [facility] on 2025 for OP PCI RCA, complicated by pain of the back, possible dissection. Surgery was consulted, stented small dissection of the right iliac artery. She had groin sheath removed this am, she remains on bed rest for now. Otherwise her vitals and labs have been stable. If she ambulates well this afternoon, she is deemed stable for discharge. She will be followed up by structural heart clinic for her .        No nitro use due to AS.     Right/Left femoral-no ecchymosis, nontender, no edema, NVI bilaterally   Procedures: [facility]    Code Status: Full Code     Consults:   pulmonary/intensive care and vascular surgery    Examination:  Vitals:BP (!) 124/41   Pulse 73   Temp 97.9 °F (36.6 °C) (Oral)   Resp 14   Ht 1.626 m (5' 4\")   Wt 60.2 kg (132 lb 12.8 oz)   SpO2 98%   BMI 22.80 kg/m²      24 hour intake/output:  Intake/Output Summary (Last 24 hours) at 2025 1129  Last data filed at 2025 0815  Gross per 24 hour   Intake 100 ml   Output 1005 ml   Net -905 ml       General Appearance: alert and oriented to person, place and time, in no acute distress  Cardiovascular: normal rate, regular rhythm, normal S1 and S2, ++ murmur, no rubs, clicks, or gallops, distal pulses intact, no carotid bruits, no  444 ms    P Axis 63 degrees    R Axis -16 degrees    T Axis 77 degrees   CBC    Collection Time: 05/31/25  5:35 AM   Result Value Ref Range    WBC 7.5 4.8 - 10.8 thou/mm3    RBC 3.52 (L) 4.20 - 5.40 mill/mm3    Hemoglobin 10.7 (L) 12.0 - 16.0 gm/dl    Hematocrit 32.7 (L) 37.0 - 47.0 %    MCV 92.9 81.0 - 99.0 fL    MCH 30.4 26.0 - 33.0 pg    MCHC 32.7 32.2 - 35.5 gm/dl    RDW-CV 17.6 (H) 11.5 - 14.5 %    RDW-SD 59.5 (H) 35.0 - 45.0 fL    Platelets 229 130 - 400 thou/mm3    MPV 9.7 9.4 - 12.4 fL   Basic Metabolic Panel    Collection Time: 05/31/25  5:35 AM   Result Value Ref Range    Sodium 136 135 - 145 meq/L    Potassium 4.3 3.5 - 5.2 meq/L    Chloride 102 98 - 111 meq/L    CO2 23 22 - 29 meq/L    Glucose 97 74 - 109 mg/dL    BUN 22 8 - 23 mg/dL    Creatinine 1.0 (H) 0.5 - 0.9 mg/dL    Calcium 8.9 8.8 - 10.2 mg/dL   Lipid Panel    Collection Time: 05/31/25  5:35 AM   Result Value Ref Range    Cholesterol, Total 130 100 - 199 mg/dL    Triglycerides 55 0 - 199 mg/dL    HDL 75 mg/dL    LDL Cholesterol 44 mg/dL   Anion Gap    Collection Time: 05/31/25  5:35 AM   Result Value Ref Range    Anion Gap 11.0 8.0 - 16.0 meq/L   Glomerular Filtration Rate, Estimated    Collection Time: 05/31/25  5:35 AM   Result Value Ref Range    Est, Glom Filt Rate 55 (A) >60 ml/min/1.73m2   Activated clotting time    Collection Time: 05/31/25  8:26 AM   Result Value Ref Range    ACT-K 135 1 - 150 seconds       Patient and Practitioner mutually agreed upon goal:   Patient and provider goals: Feel better and have more energy and Start exercise program     Patient Instructions:    Discharge lab work: none  Activity: no heavy lifting for 2 weeks  Diet: ADULT DIET; Regular; Low Fat/Low Chol/High Fiber/ANA M      Cardiac Rehab: Yes    Follow-up visits:   No follow-up provider specified.     Discharge condition: stable  Disposition: Home  Time spent on discharge: >30 mins      Discharge Medications for PCI/MI (performed or attempted):   ASA:

## 2025-05-31 NOTE — PROGRESS NOTES
CRITICAL CARE PROGRESS NOTE    Patient:   Marlene Ray    Unit/Bed: 4D-01/001-A  YOB: 1940  MRN:  [id number]   PCP:  Emanuel Michael,   Date of Admission:  5/30/2025    Assessment and Plan:   Hypotension. Art line 's/40's, cuff pressures 90's/30's 5/31. Decreased skin turgor. No pulmonary edema on CXR. Cautious fluids given severe aortic stenosis.   NS 500cc bolus then 50 cc/h for 10 hours.   Ischemic cardiomyopathy, CAD: S/p LHC with PCI RCA 5/30.  Previous LHC 5/12/2025 with LCx lithotripsy then ALEXANDRA.  Cardiology consulted.  Aspirin 81 Mg p.o. qd  Plavix 75 mg p.o. qd  Toprol 50 mg p.o. qd  Atorvastatin 80 mg p.o. qhs  Telemetry, strict I/O, daily weight  Acute postoperative pulmonary insufficiency.  Patient requiring nasal cannula supplemental oxygen to maintain SPO2 > 90%. CXR 5/31 no pulmonary edema.  Wean supplemental oxygen as tolerated, target SPO2 >90%.  Incentive spirometry  Right iliac artery dissection, small.  No additional radiographic information available aside from Dr. Car CT surgeon note 5/30/2025.  S/p stenting.  On DAPT.  Severe aortic stenosis, symptomatic: 3/6 systolic ejection murmur on exam.  Being worked up for TAVR.  Past echo [facility] 3/2025 EF 60%, severe aortic stenosis.  Right ICA atherosclerosis: 5/23/2025 diagnostic cerebral angiogram 42% stenosis right, 52% stenosis left.  On Aspirin, Plavix, statin.  Paroxysmal atrial fibrillation. Formerly on warfarin, stopped due to GIB 2/2025. On Toprol.  Hypertension: On amlodipine 2.5 Mg p.o. qd, Toprol 50 mg p.o. qd, lisinopril 20 mg p.o. qd.  Diastolic heart failure, suspected: No echo with diastolic dysfunction in EMR available for review.  Diagnosis noted historically on chart review.  On home lisinopril, Toprol.  No MRA, SGLT2i.  On home Lasix 20 mg p.o. qd, potassium 10 mEq p.o. bid.  Tobacco use.  Defer nicotine patch.    Chief Complaint:  Elective LHC.    Subjective:  Patient does well today. She

## 2025-06-01 PROBLEM — R07.9 CHEST PAIN: Status: ACTIVE | Noted: 2025-06-01

## 2025-06-01 PROBLEM — I77.72 ILIAC ARTERY DISSECTION: Status: ACTIVE | Noted: 2025-06-01

## 2025-06-01 LAB — ECHO BSA: 1.65 M2

## 2025-06-03 ENCOUNTER — TELEPHONE (OUTPATIENT)
Dept: CARDIOLOGY CLINIC | Age: 85
End: 2025-06-03

## 2025-06-03 DIAGNOSIS — I35.0 SEVERE AORTIC STENOSIS: Primary | ICD-10-CM

## 2025-06-03 NOTE — TELEPHONE ENCOUNTER
Orders received from Dr. Rodriguez to schedule the patient for a TAVR procedure and have the patient hold the follow medications the morning of the TAVR procedure: Norvasc, Toprol XL, Lasix, Potassium, Lisinopril and all supplements.      TAVR: 6/10/25 at 1000 with an arrival of 0800  Discharge home with daughters.     Valve Rep Vito notified with [employer]  Pacer Rep not needed.    Post TAVR instructions per Dr. Rodriguez: have the patient be seen in the Structural Heart Clinic 7 days post TAVR, obtain a CBC, BMP and ECHO prior to the 30 day post TAVR follow up appointment.    7 day post TAVR appointment:6/18/25 at 1200  CBC/BMP/ECHO:7/15/25 at 1100  30 day post TAVR appointment:7/15/25 at 1300    All instructions reviewed with patient and daughter Roxanne.      Primary Cardiologist:  Dr. Rayna Rodriguez, please agree.     [place], can you please check prior auth?

## 2025-06-03 NOTE — TELEPHONE ENCOUNTER
Echo scheduled 7/15/2025 at 11:00 am.    TAVR Precert submitted on 6/3/2025  DOS: 6/10/2025    [x] Rodriguez OV    [x] CHF OV    [x] CT/CV OV     [x] EKG    [x] Echo    [x] Heart Cath    [x] CTA Neck    [x] CTA Abd/Pelvis

## 2025-06-04 NOTE — PROGRESS NOTES
Physician Progress Note      PATIENT:               VERA REYNOLDS  CSN #:                  [id number]  :                       1940  ADMIT DATE:       2025 8:50 AM  DISCH DATE:        2025 4:20 PM  RESPONDING  PROVIDER #:        Raffi Rodriguez MD          QUERY TEXT:    Based on your medical judgment, please clarify these findings and document if   any of the following are being evaluated and/or treated:    The clinical indicators include:  -pt. admitted for cardiac cath with stenting of RCA  -Hx: Heart failure, CAD, HTN, Afib    -Critical care ():  \" 1. Acute postoperative pulmonary insufficiency.  Patient requiring nasal   cannula supplemental oxygen to maintain SPO2 > 90%. CXR  no pulmonary   edema.  a. Wean supplemental oxygen as tolerated, target SPO2 >90%.\"    -Per Flowsheet:  - Pulse ox: 90-99%, Oxygen requirements: Room air-15L, Respirations: 8-20  -Pulse ox: %, Oxygen requirements: room air-2L, Respirations: 10-23    -Per Nursing Flowsheet:   at 1600: Regular Respiratory pattern, Normal respiratory depth, unlabored   respiratory effort   at 1200: Regular Respiratory pattern, Normal respiratory depth, unlabored   respiratory effort    -CXR (): \"No acute findings.\"    -Treatments: Supplemental oxygen, Incentive spirometer, CXR, vital check per   unit routine, lab monitoring  Options provided:  -- Acute pulmonary insufficiency following surgery ruled out after study  -- Acute pulmonary insufficiency following surgery as evidenced by, Please   include evidence of pulmonary insufficiency  -- Other - I will add my own diagnosis  -- Disagree - Not applicable / Not valid  -- Disagree - Clinically unable to determine / Unknown  -- Refer to Clinical Documentation Reviewer    PROVIDER RESPONSE TEXT:    This patient has acute postoperative pulmonary insufficiency as evidenced by   intraprocedural hypoxia related to sedation.    Query created by: Terry Maloney on

## 2025-06-09 RX ORDER — SODIUM CHLORIDE 0.9 % (FLUSH) 0.9 %
5-40 SYRINGE (ML) INJECTION PRN
Status: CANCELLED | OUTPATIENT
Start: 2025-06-09

## 2025-06-09 RX ORDER — SODIUM CHLORIDE 0.9 % (FLUSH) 0.9 %
5-40 SYRINGE (ML) INJECTION EVERY 12 HOURS SCHEDULED
Status: CANCELLED | OUTPATIENT
Start: 2025-06-09

## 2025-06-09 RX ORDER — CLOPIDOGREL BISULFATE 75 MG/1
75 TABLET ORAL DAILY
Qty: 30 TABLET | Refills: 3 | Status: SHIPPED | OUTPATIENT
Start: 2025-06-09

## 2025-06-09 RX ORDER — SODIUM CHLORIDE 9 MG/ML
INJECTION, SOLUTION INTRAVENOUS CONTINUOUS
Status: CANCELLED | OUTPATIENT
Start: 2025-06-09

## 2025-06-09 RX ORDER — SODIUM CHLORIDE 9 MG/ML
INJECTION, SOLUTION INTRAVENOUS PRN
Status: CANCELLED | OUTPATIENT
Start: 2025-06-09

## 2025-06-10 ENCOUNTER — HOSPITAL ENCOUNTER (INPATIENT)
Age: 85
LOS: 8 days | Discharge: HOME | DRG: 267 | End: 2025-06-18
Attending: INTERNAL MEDICINE | Admitting: INTERNAL MEDICINE
Payer: MEDICARE

## 2025-06-10 DIAGNOSIS — Z95.2 HISTORY OF TRANSCATHETER AORTIC VALVE REPLACEMENT (TAVR): ICD-10-CM

## 2025-06-10 DIAGNOSIS — I49.8 OTHER CARDIAC ARRHYTHMIA: ICD-10-CM

## 2025-06-10 DIAGNOSIS — Z95.2 S/P TAVR (TRANSCATHETER AORTIC VALVE REPLACEMENT): ICD-10-CM

## 2025-06-10 DIAGNOSIS — I48.91 ATRIAL FIBRILLATION (HCC): ICD-10-CM

## 2025-06-10 DIAGNOSIS — R06.02 SHORTNESS OF BREATH: ICD-10-CM

## 2025-06-10 DIAGNOSIS — I35.0 SEVERE AORTIC STENOSIS: ICD-10-CM

## 2025-06-10 DIAGNOSIS — I31.4 CARDIAC TAMPONADE: ICD-10-CM

## 2025-06-10 DIAGNOSIS — I31.39 PERICARDIAL EFFUSION: ICD-10-CM

## 2025-06-10 DIAGNOSIS — Z95.2 HEART VALVE REPLACED BY TRANSPLANT: ICD-10-CM

## 2025-06-10 DIAGNOSIS — Z00.6 ENCOUNTER FOR EXAMINATION FOR NORMAL COMPARISON OR CONTROL IN CLINICAL RESEARCH PROGRAM: ICD-10-CM

## 2025-06-10 DIAGNOSIS — Z79.899 ON POTASSIUM WASTING DIURETIC THERAPY: Primary | ICD-10-CM

## 2025-06-10 LAB
ABO GROUP BLD: NORMAL
ACTIVATED CLOTTING TIME: 279 SECONDS (ref 1–150)
ALBUMIN SERPL BCG-MCNC: 3.5 G/DL (ref 3.4–4.9)
ALP SERPL-CCNC: 79 U/L (ref 38–126)
ALT SERPL W/O P-5'-P-CCNC: 19 U/L (ref 10–35)
ANION GAP SERPL CALC-SCNC: 11 MEQ/L (ref 8–16)
APTT PPP: 32.1 SECONDS (ref 22–38)
AST SERPL-CCNC: 26 U/L (ref 10–35)
BILIRUB SERPL-MCNC: 0.4 MG/DL (ref 0.3–1.2)
BUN SERPL-MCNC: 15 MG/DL (ref 8–23)
CALCIUM SERPL-MCNC: 10 MG/DL (ref 8.8–10.2)
CHLORIDE SERPL-SCNC: 103 MEQ/L (ref 98–111)
CHOLEST SERPL-MCNC: 147 MG/DL (ref 100–199)
CO2 SERPL-SCNC: 25 MEQ/L (ref 22–29)
CREAT SERPL-MCNC: 0.8 MG/DL (ref 0.5–0.9)
DEPRECATED RDW RBC AUTO: 56.3 FL (ref 35–45)
ECHO BSA: 1.63 M2
EKG ATRIAL RATE: 74 BPM
EKG ATRIAL RATE: 95 BPM
EKG P AXIS: 68 DEGREES
EKG P AXIS: 69 DEGREES
EKG P-R INTERVAL: 148 MS
EKG P-R INTERVAL: 164 MS
EKG Q-T INTERVAL: 368 MS
EKG Q-T INTERVAL: 404 MS
EKG QRS DURATION: 124 MS
EKG QRS DURATION: 76 MS
EKG QTC CALCULATION (BAZETT): 408 MS
EKG QTC CALCULATION (BAZETT): 507 MS
EKG R AXIS: -13 DEGREES
EKG R AXIS: -23 DEGREES
EKG T AXIS: 100 DEGREES
EKG T AXIS: 58 DEGREES
EKG VENTRICULAR RATE: 74 BPM
EKG VENTRICULAR RATE: 95 BPM
ERYTHROCYTE [DISTWIDTH] IN BLOOD BY AUTOMATED COUNT: 16.4 % (ref 11.5–14.5)
GFR SERPL CREATININE-BSD FRML MDRD: 72 ML/MIN/1.73M2
GLUCOSE SERPL-MCNC: 102 MG/DL (ref 74–109)
HCT VFR BLD AUTO: 33.5 % (ref 37–47)
HDLC SERPL-MCNC: 64 MG/DL
HGB BLD-MCNC: 10.7 GM/DL (ref 12–16)
IAT IGG-SP REAG SERPL QL: NORMAL
INR PPP: 1.01 (ref 0.85–1.13)
LDLC SERPL CALC-MCNC: 69 MG/DL
MCH RBC QN AUTO: 30.1 PG (ref 26–33)
MCHC RBC AUTO-ENTMCNC: 31.9 GM/DL (ref 32.2–35.5)
MCV RBC AUTO: 94.4 FL (ref 81–99)
NT-PROBNP SERPL IA-MCNC: 1431 PG/ML (ref 0–449)
PLATELET # BLD AUTO: 319 THOU/MM3 (ref 130–400)
PMV BLD AUTO: 9.2 FL (ref 9.4–12.4)
POTASSIUM SERPL-SCNC: 4.4 MEQ/L (ref 3.5–5.2)
PROT SERPL-MCNC: 6.7 G/DL (ref 6.4–8.3)
PROTHROMBIN TIME: 11.5 SECONDS (ref 10–13.5)
RBC # BLD AUTO: 3.55 MILL/MM3 (ref 4.2–5.4)
RH BLD: NORMAL
SODIUM SERPL-SCNC: 139 MEQ/L (ref 135–145)
TRIGL SERPL-MCNC: 72 MG/DL (ref 0–199)
WBC # BLD AUTO: 5.5 THOU/MM3 (ref 4.8–10.8)

## 2025-06-10 PROCEDURE — 93005 ELECTROCARDIOGRAM TRACING: CPT | Performed by: INTERNAL MEDICINE

## 2025-06-10 PROCEDURE — 83880 ASSAY OF NATRIURETIC PEPTIDE: CPT

## 2025-06-10 PROCEDURE — 99152 MOD SED SAME PHYS/QHP 5/>YRS: CPT | Performed by: INTERNAL MEDICINE

## 2025-06-10 PROCEDURE — 2500000003 HC RX 250 WO HCPCS: Performed by: INTERNAL MEDICINE

## 2025-06-10 PROCEDURE — 36415 COLL VENOUS BLD VENIPUNCTURE: CPT

## 2025-06-10 PROCEDURE — 2720000010 HC SURG SUPPLY STERILE: Performed by: INTERNAL MEDICINE

## 2025-06-10 PROCEDURE — C1760 CLOSURE DEV, VASC: HCPCS | Performed by: INTERNAL MEDICINE

## 2025-06-10 PROCEDURE — 86900 BLOOD TYPING SEROLOGIC ABO: CPT

## 2025-06-10 PROCEDURE — 93005 ELECTROCARDIOGRAM TRACING: CPT | Performed by: NURSE PRACTITIONER

## 2025-06-10 PROCEDURE — 33361 REPLACE AORTIC VALVE PERQ: CPT | Performed by: INTERNAL MEDICINE

## 2025-06-10 PROCEDURE — C1769 GUIDE WIRE: HCPCS | Performed by: INTERNAL MEDICINE

## 2025-06-10 PROCEDURE — 6360000002 HC RX W HCPCS: Performed by: INTERNAL MEDICINE

## 2025-06-10 PROCEDURE — 85610 PROTHROMBIN TIME: CPT

## 2025-06-10 PROCEDURE — 86923 COMPATIBILITY TEST ELECTRIC: CPT

## 2025-06-10 PROCEDURE — 2580000003 HC RX 258: Performed by: NURSE PRACTITIONER

## 2025-06-10 PROCEDURE — 80061 LIPID PANEL: CPT

## 2025-06-10 PROCEDURE — 6370000000 HC RX 637 (ALT 250 FOR IP): Performed by: INTERNAL MEDICINE

## 2025-06-10 PROCEDURE — 02RF38Z REPLACEMENT OF AORTIC VALVE WITH ZOOPLASTIC TISSUE, PERCUTANEOUS APPROACH: ICD-10-PCS | Performed by: THORACIC SURGERY (CARDIOTHORACIC VASCULAR SURGERY)

## 2025-06-10 PROCEDURE — 93010 ELECTROCARDIOGRAM REPORT: CPT | Performed by: INTERNAL MEDICINE

## 2025-06-10 PROCEDURE — C1889 IMPLANT/INSERT DEVICE, NOC: HCPCS | Performed by: INTERNAL MEDICINE

## 2025-06-10 PROCEDURE — 6360000002 HC RX W HCPCS: Performed by: NURSE PRACTITIONER

## 2025-06-10 PROCEDURE — 2500000003 HC RX 250 WO HCPCS: Performed by: NURSE PRACTITIONER

## 2025-06-10 PROCEDURE — 85027 COMPLETE CBC AUTOMATED: CPT

## 2025-06-10 PROCEDURE — 33361 REPLACE AORTIC VALVE PERQ: CPT | Performed by: THORACIC SURGERY (CARDIOTHORACIC VASCULAR SURGERY)

## 2025-06-10 PROCEDURE — C1894 INTRO/SHEATH, NON-LASER: HCPCS | Performed by: INTERNAL MEDICINE

## 2025-06-10 PROCEDURE — 99153 MOD SED SAME PHYS/QHP EA: CPT | Performed by: INTERNAL MEDICINE

## 2025-06-10 PROCEDURE — C1725 CATH, TRANSLUMIN NON-LASER: HCPCS | Performed by: INTERNAL MEDICINE

## 2025-06-10 PROCEDURE — 85347 COAGULATION TIME ACTIVATED: CPT

## 2025-06-10 PROCEDURE — 2709999900 HC NON-CHARGEABLE SUPPLY: Performed by: INTERNAL MEDICINE

## 2025-06-10 PROCEDURE — 6360000004 HC RX CONTRAST MEDICATION: Performed by: INTERNAL MEDICINE

## 2025-06-10 PROCEDURE — G0269 OCCLUSIVE DEVICE IN VEIN ART: HCPCS | Performed by: INTERNAL MEDICINE

## 2025-06-10 PROCEDURE — 85730 THROMBOPLASTIN TIME PARTIAL: CPT

## 2025-06-10 PROCEDURE — 86885 COOMBS TEST INDIRECT QUAL: CPT

## 2025-06-10 PROCEDURE — 2140000000 HC CCU INTERMEDIATE R&B

## 2025-06-10 PROCEDURE — 86901 BLOOD TYPING SEROLOGIC RH(D): CPT

## 2025-06-10 PROCEDURE — 80053 COMPREHEN METABOLIC PANEL: CPT

## 2025-06-10 DEVICE — ANGIO-SEAL VIP VASCULAR CLOSURE DEVICE
Type: IMPLANTABLE DEVICE | Status: FUNCTIONAL
Brand: ANGIO-SEAL

## 2025-06-10 DEVICE — TAV EVFXPLUS-26 COMM US
Type: IMPLANTABLE DEVICE | Status: FUNCTIONAL
Brand: EVOLUT™ FX+

## 2025-06-10 RX ORDER — BISACODYL 5 MG/1
5 TABLET, DELAYED RELEASE ORAL DAILY PRN
Status: DISCONTINUED | OUTPATIENT
Start: 2025-06-10 | End: 2025-06-18 | Stop reason: HOSPADM

## 2025-06-10 RX ORDER — HEPARIN SODIUM 10000 [USP'U]/ML
INJECTION, SOLUTION INTRAVENOUS; SUBCUTANEOUS PRN
Status: DISCONTINUED | OUTPATIENT
Start: 2025-06-10 | End: 2025-06-10 | Stop reason: HOSPADM

## 2025-06-10 RX ORDER — CLOPIDOGREL BISULFATE 75 MG/1
75 TABLET ORAL DAILY
Status: DISCONTINUED | OUTPATIENT
Start: 2025-06-11 | End: 2025-06-18 | Stop reason: HOSPADM

## 2025-06-10 RX ORDER — SODIUM CHLORIDE 9 MG/ML
INJECTION, SOLUTION INTRAVENOUS CONTINUOUS
Status: DISCONTINUED | OUTPATIENT
Start: 2025-06-10 | End: 2025-06-10 | Stop reason: SDUPTHER

## 2025-06-10 RX ORDER — NITROGLYCERIN 20 MG/100ML
INJECTION INTRAVENOUS CONTINUOUS PRN
Status: DISCONTINUED | OUTPATIENT
Start: 2025-06-10 | End: 2025-06-10 | Stop reason: HOSPADM

## 2025-06-10 RX ORDER — MIDAZOLAM HYDROCHLORIDE 1 MG/ML
INJECTION, SOLUTION INTRAMUSCULAR; INTRAVENOUS PRN
Status: DISCONTINUED | OUTPATIENT
Start: 2025-06-10 | End: 2025-06-10 | Stop reason: HOSPADM

## 2025-06-10 RX ORDER — SODIUM CHLORIDE 0.9 % (FLUSH) 0.9 %
5-40 SYRINGE (ML) INJECTION EVERY 12 HOURS SCHEDULED
Status: DISCONTINUED | OUTPATIENT
Start: 2025-06-10 | End: 2025-06-18 | Stop reason: HOSPADM

## 2025-06-10 RX ORDER — SODIUM CHLORIDE 9 MG/ML
INJECTION, SOLUTION INTRAVENOUS CONTINUOUS
Status: DISCONTINUED | OUTPATIENT
Start: 2025-06-10 | End: 2025-06-12

## 2025-06-10 RX ORDER — SODIUM CHLORIDE 0.9 % (FLUSH) 0.9 %
5-40 SYRINGE (ML) INJECTION PRN
Status: DISCONTINUED | OUTPATIENT
Start: 2025-06-10 | End: 2025-06-10 | Stop reason: SDUPTHER

## 2025-06-10 RX ORDER — SODIUM CHLORIDE 0.9 % (FLUSH) 0.9 %
5-40 SYRINGE (ML) INJECTION EVERY 12 HOURS SCHEDULED
Status: DISCONTINUED | OUTPATIENT
Start: 2025-06-10 | End: 2025-06-10 | Stop reason: SDUPTHER

## 2025-06-10 RX ORDER — ATROPINE SULFATE 0.4 MG/ML
0.5 INJECTION, SOLUTION INTRAVENOUS
Status: DISCONTINUED | OUTPATIENT
Start: 2025-06-10 | End: 2025-06-14

## 2025-06-10 RX ORDER — PROTAMINE SULFATE 10 MG/ML
INJECTION, SOLUTION INTRAVENOUS PRN
Status: DISCONTINUED | OUTPATIENT
Start: 2025-06-10 | End: 2025-06-10 | Stop reason: HOSPADM

## 2025-06-10 RX ORDER — SODIUM CHLORIDE 9 MG/ML
INJECTION, SOLUTION INTRAVENOUS PRN
Status: DISCONTINUED | OUTPATIENT
Start: 2025-06-10 | End: 2025-06-10 | Stop reason: SDUPTHER

## 2025-06-10 RX ORDER — SODIUM CHLORIDE 0.9 % (FLUSH) 0.9 %
5-40 SYRINGE (ML) INJECTION PRN
Status: DISCONTINUED | OUTPATIENT
Start: 2025-06-10 | End: 2025-06-18 | Stop reason: HOSPADM

## 2025-06-10 RX ORDER — ONDANSETRON 4 MG/1
4 TABLET, ORALLY DISINTEGRATING ORAL EVERY 8 HOURS PRN
Status: DISCONTINUED | OUTPATIENT
Start: 2025-06-10 | End: 2025-06-18 | Stop reason: HOSPADM

## 2025-06-10 RX ORDER — ONDANSETRON 2 MG/ML
INJECTION INTRAMUSCULAR; INTRAVENOUS PRN
Status: DISCONTINUED | OUTPATIENT
Start: 2025-06-10 | End: 2025-06-10 | Stop reason: HOSPADM

## 2025-06-10 RX ORDER — POLYETHYLENE GLYCOL 3350 17 G/17G
17 POWDER, FOR SOLUTION ORAL DAILY PRN
Status: DISCONTINUED | OUTPATIENT
Start: 2025-06-10 | End: 2025-06-18 | Stop reason: HOSPADM

## 2025-06-10 RX ORDER — ACETAMINOPHEN 325 MG/1
650 TABLET ORAL EVERY 4 HOURS PRN
Status: DISCONTINUED | OUTPATIENT
Start: 2025-06-10 | End: 2025-06-18 | Stop reason: HOSPADM

## 2025-06-10 RX ORDER — SODIUM CHLORIDE 9 MG/ML
INJECTION, SOLUTION INTRAVENOUS PRN
Status: DISCONTINUED | OUTPATIENT
Start: 2025-06-10 | End: 2025-06-18 | Stop reason: HOSPADM

## 2025-06-10 RX ORDER — HYDRALAZINE HYDROCHLORIDE 20 MG/ML
10 INJECTION INTRAMUSCULAR; INTRAVENOUS EVERY 10 MIN PRN
Status: DISCONTINUED | OUTPATIENT
Start: 2025-06-10 | End: 2025-06-18 | Stop reason: HOSPADM

## 2025-06-10 RX ORDER — IOPAMIDOL 755 MG/ML
INJECTION, SOLUTION INTRAVASCULAR PRN
Status: DISCONTINUED | OUTPATIENT
Start: 2025-06-10 | End: 2025-06-10 | Stop reason: HOSPADM

## 2025-06-10 RX ORDER — ASPIRIN 81 MG/1
81 TABLET, CHEWABLE ORAL DAILY
Status: DISCONTINUED | OUTPATIENT
Start: 2025-06-11 | End: 2025-06-14

## 2025-06-10 RX ORDER — ATORVASTATIN CALCIUM 80 MG/1
80 TABLET, FILM COATED ORAL EVERY EVENING
Status: DISCONTINUED | OUTPATIENT
Start: 2025-06-10 | End: 2025-06-18 | Stop reason: HOSPADM

## 2025-06-10 RX ORDER — FENTANYL CITRATE 50 UG/ML
INJECTION, SOLUTION INTRAMUSCULAR; INTRAVENOUS PRN
Status: DISCONTINUED | OUTPATIENT
Start: 2025-06-10 | End: 2025-06-10 | Stop reason: HOSPADM

## 2025-06-10 RX ORDER — ONDANSETRON 2 MG/ML
4 INJECTION INTRAMUSCULAR; INTRAVENOUS EVERY 6 HOURS PRN
Status: DISCONTINUED | OUTPATIENT
Start: 2025-06-10 | End: 2025-06-18 | Stop reason: HOSPADM

## 2025-06-10 RX ORDER — ASCORBIC ACID 500 MG
1000 TABLET ORAL DAILY
Status: DISCONTINUED | OUTPATIENT
Start: 2025-06-10 | End: 2025-06-18 | Stop reason: HOSPADM

## 2025-06-10 RX ORDER — FERROUS SULFATE 325(65) MG
325 TABLET ORAL DAILY
Status: DISCONTINUED | OUTPATIENT
Start: 2025-06-10 | End: 2025-06-18 | Stop reason: HOSPADM

## 2025-06-10 RX ORDER — VITAMIN E 268 MG
400 CAPSULE ORAL DAILY
Status: DISCONTINUED | OUTPATIENT
Start: 2025-06-10 | End: 2025-06-18 | Stop reason: HOSPADM

## 2025-06-10 RX ORDER — DIPHENHYDRAMINE HYDROCHLORIDE 50 MG/ML
50 INJECTION, SOLUTION INTRAMUSCULAR; INTRAVENOUS ONCE
Status: COMPLETED | OUTPATIENT
Start: 2025-06-10 | End: 2025-06-10

## 2025-06-10 RX ADMIN — SODIUM CHLORIDE: 0.9 INJECTION, SOLUTION INTRAVENOUS at 08:50

## 2025-06-10 RX ADMIN — DIPHENHYDRAMINE HYDROCHLORIDE 50 MG: 50 INJECTION INTRAMUSCULAR; INTRAVENOUS at 09:12

## 2025-06-10 RX ADMIN — SODIUM CHLORIDE, PRESERVATIVE FREE 10 ML: 5 INJECTION INTRAVENOUS at 20:36

## 2025-06-10 RX ADMIN — ACETAMINOPHEN 650 MG: 325 TABLET ORAL at 20:32

## 2025-06-10 RX ADMIN — ATORVASTATIN CALCIUM 80 MG: 80 TABLET, FILM COATED ORAL at 20:32

## 2025-06-10 RX ADMIN — WATER 2000 MG: 1 INJECTION INTRAMUSCULAR; INTRAVENOUS; SUBCUTANEOUS at 09:06

## 2025-06-10 ASSESSMENT — PAIN - FUNCTIONAL ASSESSMENT: PAIN_FUNCTIONAL_ASSESSMENT: PREVENTS OR INTERFERES SOME ACTIVE ACTIVITIES AND ADLS

## 2025-06-10 ASSESSMENT — PAIN SCALES - GENERAL
PAINLEVEL_OUTOF10: 8
PAINLEVEL_OUTOF10: 4

## 2025-06-10 ASSESSMENT — PAIN DESCRIPTION - ORIENTATION: ORIENTATION: LEFT

## 2025-06-10 ASSESSMENT — PAIN DESCRIPTION - DESCRIPTORS: DESCRIPTORS: SHARP

## 2025-06-10 ASSESSMENT — PAIN DESCRIPTION - ONSET: ONSET: ON-GOING

## 2025-06-10 ASSESSMENT — PAIN DESCRIPTION - LOCATION: LOCATION: HIP

## 2025-06-10 ASSESSMENT — PAIN DESCRIPTION - PAIN TYPE: TYPE: ACUTE PAIN;SURGICAL PAIN

## 2025-06-10 ASSESSMENT — PAIN DESCRIPTION - FREQUENCY: FREQUENCY: CONTINUOUS

## 2025-06-10 NOTE — H&P
Yes Rdorigo Marcial MD   atorvastatin (LIPITOR) 80 MG tablet Take 1 tablet by mouth every evening 3/6/25  Yes Rodrigo Marcial MD   ferrous sulfate (IRON 325) 325 (65 Fe) MG tablet Take 1 tablet by mouth daily 3/27/25  Yes Rodrigo Marcial MD   Magnesium Glycinate 100 MG CAPS Take 2 capsules by mouth 2 times daily 3/13/25  Yes Rodrigo Marcial MD   metoprolol succinate (TOPROL XL) 50 MG extended release tablet Take 1 tablet by mouth daily 3/6/25 6/10/25 Yes Rodrigo Marcial MD   furosemide (LASIX) 40 MG tablet Take 0.5 tablets by mouth daily   Yes Rodrigo Marcial MD   potassium citrate (UROCIT-K) 10 MEQ (1080 MG) extended release tablet Take 1 tablet by mouth in the morning and at bedtime   Yes Rodrigo Marcial MD   lisinopril (PRINIVIL;ZESTRIL) 20 MG tablet Take 1 tablet by mouth daily   Yes Rodrigo Marcial MD   Ascorbic Acid (VITAMIN C) 1000 MG tablet Take 1 tablet by mouth daily   Yes Rodrigo Marcial MD   Cholecalciferol (VITAMIN D3) 50 MCG (2000 UT) CAPS Take by mouth daily   Yes ProviderRodrigo MD   Multiple Vitamin (MULTIVITAMIN PO) Take by mouth daily   Yes ProviderRodrigo MD   milk thistle 175 MG tablet Take 1 tablet by mouth daily   Yes Rodrigo Marcial MD   Digestive Enzymes (MULTI-ENZYME PO) Take by mouth daily   Yes Rodrigo Marcial MD   Cinnamon 500 MG TABS Take by mouth daily   Yes Rodrigo Marcial MD   Turmeric 450 MG CAPS Take by mouth daily   Yes Rodrigo Marcial MD   Multiple Vitamins-Minerals (LUTEIN-ZEAXANTHIN PO) Take by mouth daily Lutein 10 mg Zeaxanthin 4 mg   Yes Rodrigo Marcial MD   Omega-3 Fatty Acids (OMEGA 3 PO) Take 1,360 mg by mouth 2 times daily   Yes Rodrigo Marcial MD   CALCIUM-MAGNESIUM PO Take by mouth 2 times daily   Yes Rodrigo Marcial MD   ECHINACEA PO Take 1,200 mg by mouth once a week   Yes Rodrigo Marcial MD   melatonin 1 MG tablet Take 1 tablet by mouth nightly as  4: Severe systemic disorders that are already life threatening.  Class 5: Moribund pt with little chances of survival, for more than 24 hours.  Mallampati I Airway Classification:   []1 []2 [x]3 []4    [x]Pre-procedure diagnostic studies complete and results available.   Comment:    [x]Previous sedation/anesthesia experiences assessed.   Comment:    [x]The patient is an appropriate candidate to undergo the planned procedure sedation and anesthesia. (Refer to nursing sedation/analgesia documentation record)  [x]Formulation and discussion of sedation/procedure plan, risks, and expectations with patient and/or responsible adult completed.  [x]Patient examined immediately prior to the procedure. (Refer to nursing sedation/analgesia documentation record)    Electronically signed by Raffi Rodriguez MD on 6/10/25 at 8:58 AM EDT

## 2025-06-10 NOTE — BRIEF OP NOTE
[facility] [place]  Sedation/Analgesia Post Sedation Record    Pt Name: Marlene Ray  Account number: [id number]  MRN: [id number]  YOB: 1940  Procedure Performed By: Raffi Rodriguez MD MD FACC, FSCAI, RPVI  Primary Care Physician: Emanuel Michael,   Date: 6/10/2025    POST-PROCEDURE    Physicians/Assistants: Raffi Rodriguez MD, GHAZAL, [facility], RPVI    Procedure Performed: TAVR    Sedation/Anesthesia: Versed/ Fentanyl and 2% xylocaine local anesthesia.      Estimated Blood Loss: < 50 ml.     Specimens Removed: None         Disposition of Specimen: N/A        Complications: No Immediate Complications.       Post-procedure Diagnosis/Findings:       EFX+26   Predil 20 x 4.5 True Dilation      Electronically signed by Raffi Rodriguez MD on 6/10/25 at 11:29 AM EDT   Interventional Cardiology

## 2025-06-10 NOTE — CARE COORDINATION
06/10/25 1242   Readmission Assessment   Number of Days since last admission? 8-30 days   Previous Disposition Home Alone   Who is being Interviewed Caregiver   What was the patient's/caregiver's perception as to why they think they needed to return back to the hospital? Other (Comment)  (elective procedure - TAVR)   Did you visit your Primary Care Physician after you left the hospital, before you returned this time? No   Why weren't you able to visit your PCP? Other (Comment);Did not have an appointment  (plan f/u after procedure.)   Did you see a specialist, such as Cardiac, Pulmonary, Orthopedic Physician, etc. after you left the hospital? Yes   Who advised the patient to return to the hospital? Physician   Does the patient report anything that got in the way of taking their medications? No   In our efforts to provide the best possible care to you and others like you, can you think of anything that we could have done to help you after you left the hospital the first time, so that you might not have needed to return so soon? Other (Comment)  (here for elective procedure.)

## 2025-06-10 NOTE — CARE COORDINATION
Case Management Assessment Initial Evaluation    Date/Time of Evaluation: 6/10/2025 9:58 AM  Assessment Completed by: Hollie Huddleston RN    If patient is discharged prior to next notation, then this note serves as note for discharge by case management.    Patient Name: Marlene Ray                   YOB: 1940  Diagnosis: Severe aortic stenosis [I35.0]                   Date / Time: 6/10/2025  7:48 AM  Location: Cath Pool Room/     Patient Admission Status: Inpatient   Readmission Risk Low 0-14, Mod 15-19), High > 20: Readmission Risk Score: 11.1    Current PCP: Emanuel Michael,   Health Care Decision Makers:     Additional Case Management Notes: Here for elective procedure, TAVR to be done today with Dr. Rodriguez. ECHO to be done in the am.  Anticipate discharge tomorrow if medically ready and after ECHO is read by Dr. Rodriguez.     Procedure:   6/10 TAVR procedure with Dr. Rodriguez.   6/11 ECHO: to be done.     Patient Goals/Plan/Treatment Preferences: Spoke with pt's daughter as pt was sleeping from procedure. Pt is [facility] with HF clinic at [facility]. She is independent and drives. Pt and family have made arrangements for someone to be with pt for the next 7 days. No needs.      06/10/25 1236   Service Assessment   Patient Orientation Alert and Oriented  (per daughter, pt sleeping)   Cognition Alert  (per daughter, pt sleeping)   History Provided By Child/Family   Primary Caregiver Self   Accompanied By/Relationship daughter   Support Systems Children;Friends/Neighbors;Family Members   Patient's Healthcare Decision Maker is: Legal Next of Kin   PCP Verified by CM Yes   Last Visit to PCP Within last 3 months   Prior Functional Level Independent in ADLs/IADLs   Current Functional Level Independent in ADLs/IADLs   Can patient return to prior living arrangement Yes   Ability to make needs known: Good   Family able to assist with home care needs: Yes   Would you like for me to discuss the discharge plan

## 2025-06-10 NOTE — PROGRESS NOTES
0751 Pt arrived ambulatory to 2E 09 with family. Admission in progress.   1000 Pt to cath lab via bed with cath lab staff.   1039 Report called to Emani RN  1120 Dr. Rodriguez in to update daughter.  1126 Pt's daughter directed to 3B 34 with all pt's documented belongings.

## 2025-06-10 NOTE — OP NOTE
DATE: 06/10/25    PATIENT: Marlene Ray    MRN: [id number]     Acct: [id number]    PREOP DIAGNOSIS:   Severe aortic stenosis    Postop diagnosis:  Severe aortic stenosis    Procedure:  Transfemoral transcatheter aortic valve replacement with a [employer] 26 mm Evolut FX prosthesis    OPERATORS:  Raffi Rodriguez MD; Valentin Parra MD    ESTIMATED BLOOD LOSS: 50 ml    A pre-procedure discussion was conducted with the patient to confirm/exclude candidacy for open surgical salvage in case of procedure failure.    After informed consent was obtained from the patient, the patient was brought to the hybrid operating room and prepped in sterile fashion. Infiltration of the bilateral inguinal regions was accomplished with 2% lidocaine. Using micropuncture and modified Seldinger technique, a 6 Fr sheath was inserted into the right internal jugular vein. A 5 Fr pacemaker was inserted into position under fluoroscopic guidance into the right ventricle, with verification of appropriate pacing thresholds.    Using the same technique under fluoroscopic guidance, a 5 Fr sheath was inserted into the right common femoral artery.  Similarly, a 4 Fr sheath, followed after contrast verification by a 5 Fr sheath was inserted into the left common femoral artery. A Supra Core wire was used to traverse the aortic arch.  We then performed standard preclose technique by deploying 2 Percloses in orthogonal fashion and leaving them incomplete. A 14 Fr sheath was inserted over the wire under guidance. The patient was heparinized to an ACT above 250 seconds.     A straight pigtail was placed via the right femoral artery and aortography was performed in a coplanar view to ensure alignment.  The aortic valve was crossed using a straight wire through an AL1 catheter. The guidewire was changed to a J-tipped wire, on which was inserted an angled pigtail.  Direct hemodynamic measurements were obtained. The valve was then checked for appropriate loading on

## 2025-06-11 ENCOUNTER — APPOINTMENT (OUTPATIENT)
Age: 85
DRG: 267 | End: 2025-06-11
Attending: INTERNAL MEDICINE
Payer: MEDICARE

## 2025-06-11 ENCOUNTER — APPOINTMENT (OUTPATIENT)
Age: 85
DRG: 267 | End: 2025-06-11
Payer: MEDICARE

## 2025-06-11 PROBLEM — I31.39 PERICARDIAL EFFUSION: Status: ACTIVE | Noted: 2025-06-11

## 2025-06-11 PROBLEM — I31.4 CARDIAC TAMPONADE: Status: ACTIVE | Noted: 2025-06-11

## 2025-06-11 LAB
ANION GAP SERPL CALC-SCNC: 10 MEQ/L (ref 8–16)
ANION GAP SERPL CALC-SCNC: 9 MEQ/L (ref 8–16)
APTT PPP: 29.8 SECONDS (ref 22–38)
APTT PPP: 30.6 SECONDS (ref 22–38)
BUN SERPL-MCNC: 10 MG/DL (ref 8–23)
BUN SERPL-MCNC: 10 MG/DL (ref 8–23)
CA-I BLD ISE-SCNC: 1.09 MMOL/L (ref 1.12–1.32)
CALCIUM SERPL-MCNC: 8.4 MG/DL (ref 8.8–10.2)
CALCIUM SERPL-MCNC: 9 MG/DL (ref 8.8–10.2)
CHLORIDE SERPL-SCNC: 104 MEQ/L (ref 98–111)
CHLORIDE SERPL-SCNC: 105 MEQ/L (ref 98–111)
CO2 SERPL-SCNC: 22 MEQ/L (ref 22–29)
CO2 SERPL-SCNC: 24 MEQ/L (ref 22–29)
CREAT SERPL-MCNC: 0.7 MG/DL (ref 0.5–0.9)
CREAT SERPL-MCNC: 1 MG/DL (ref 0.5–0.9)
DEPRECATED RDW RBC AUTO: 56.8 FL (ref 35–45)
DEPRECATED RDW RBC AUTO: 58.6 FL (ref 35–45)
ECHO AV ACCELERATION TIME: 88 MS
ECHO AV AREA PEAK VELOCITY: 1.3 CM2
ECHO AV AREA VTI: 1.6 CM2
ECHO AV AREA/BSA PEAK VELOCITY: 0.8 CM2/M2
ECHO AV AREA/BSA VTI: 1 CM2/M2
ECHO AV MEAN GRADIENT: 11 MMHG
ECHO AV MEAN VELOCITY: 1.5 M/S
ECHO AV PEAK GRADIENT: 20 MMHG
ECHO AV PEAK VELOCITY: 2.2 M/S
ECHO AV VELOCITY RATIO: 0.55
ECHO AV VTI: 43 CM
ECHO BSA: 1.63 M2
ECHO EST RA PRESSURE: 3 MMHG
ECHO LA AREA 2C: 19.8 CM2
ECHO LA AREA 4C: 20.4 CM2
ECHO LA DIAMETER INDEX: 1.48 CM/M2
ECHO LA DIAMETER: 2.4 CM
ECHO LA MAJOR AXIS: 5.6 CM
ECHO LA MINOR AXIS: 5.4 CM
ECHO LA VOL BP: 59 ML (ref 22–52)
ECHO LA VOL MOD A2C: 57 ML (ref 22–52)
ECHO LA VOL MOD A4C: 59 ML (ref 22–52)
ECHO LA VOL/BSA BIPLANE: 36 ML/M2 (ref 16–34)
ECHO LA VOLUME INDEX MOD A2C: 35 ML/M2 (ref 16–34)
ECHO LA VOLUME INDEX MOD A4C: 36 ML/M2 (ref 16–34)
ECHO LV EF PHYSICIAN: 55 %
ECHO LV EF PHYSICIAN: 55 %
ECHO LV FRACTIONAL SHORTENING: 31 % (ref 28–44)
ECHO LV INTERNAL DIMENSION DIASTOLE INDEX: 2.16 CM/M2
ECHO LV INTERNAL DIMENSION DIASTOLIC: 3.5 CM (ref 3.9–5.3)
ECHO LV INTERNAL DIMENSION SYSTOLIC INDEX: 1.48 CM/M2
ECHO LV INTERNAL DIMENSION SYSTOLIC: 2.4 CM
ECHO LV IVSD: 1.4 CM (ref 0.6–0.9)
ECHO LV MASS 2D: 163.2 G (ref 67–162)
ECHO LV MASS INDEX 2D: 100.8 G/M2 (ref 43–95)
ECHO LV POSTERIOR WALL DIASTOLIC: 1.3 CM (ref 0.6–0.9)
ECHO LV RELATIVE WALL THICKNESS RATIO: 0.74
ECHO LVOT AREA: 2.5 CM2
ECHO LVOT AV VTI INDEX: 0.63
ECHO LVOT DIAM: 1.8 CM
ECHO LVOT MEAN GRADIENT: 4 MMHG
ECHO LVOT PEAK GRADIENT: 6 MMHG
ECHO LVOT PEAK VELOCITY: 1.2 M/S
ECHO LVOT STROKE VOLUME INDEX: 42.7 ML/M2
ECHO LVOT SV: 69.2 ML
ECHO LVOT VTI: 27.2 CM
ECHO MV A VELOCITY: 1.28 M/S
ECHO MV E DECELERATION TIME (DT): 317 MS
ECHO MV E VELOCITY: 1 M/S
ECHO MV E/A RATIO: 0.78
ECHO MV REGURGITANT PEAK GRADIENT: 58 MMHG
ECHO MV REGURGITANT PEAK VELOCITY: 3.8 M/S
ECHO PULMONARY ARTERY END DIASTOLIC PRESSURE: 8 MMHG
ECHO PV MAX VELOCITY: 1.2 M/S
ECHO PV MAX VELOCITY: 1.4 M/S
ECHO PV PEAK GRADIENT: 6 MMHG
ECHO RIGHT VENTRICULAR SYSTOLIC PRESSURE (RVSP): 40 MMHG
ECHO RV INTERNAL DIMENSION: 2.3 CM
ECHO RV TAPSE: 1.9 CM (ref 1.7–?)
ECHO TV E WAVE: 0.4 M/S
ECHO TV E WAVE: 0.7 M/S
ECHO TV REGURGITANT MAX VELOCITY: 3.04 M/S
ECHO TV REGURGITANT PEAK GRADIENT: 37 MMHG
EKG ATRIAL RATE: 88 BPM
EKG ATRIAL RATE: 88 BPM
EKG ATRIAL RATE: 97 BPM
EKG P AXIS: 59 DEGREES
EKG P-R INTERVAL: 172 MS
EKG P-R INTERVAL: 172 MS
EKG P-R INTERVAL: 178 MS
EKG Q-T INTERVAL: 340 MS
EKG Q-T INTERVAL: 340 MS
EKG Q-T INTERVAL: 374 MS
EKG QRS DURATION: 120 MS
EKG QRS DURATION: 96 MS
EKG QRS DURATION: 96 MS
EKG QTC CALCULATION (BAZETT): 411 MS
EKG QTC CALCULATION (BAZETT): 411 MS
EKG QTC CALCULATION (BAZETT): 474 MS
EKG R AXIS: -12 DEGREES
EKG R AXIS: -12 DEGREES
EKG R AXIS: -23 DEGREES
EKG T AXIS: 111 DEGREES
EKG T AXIS: 150 DEGREES
EKG T AXIS: 150 DEGREES
EKG VENTRICULAR RATE: 88 BPM
EKG VENTRICULAR RATE: 88 BPM
EKG VENTRICULAR RATE: 97 BPM
ERYTHROCYTE [DISTWIDTH] IN BLOOD BY AUTOMATED COUNT: 16.5 % (ref 11.5–14.5)
ERYTHROCYTE [DISTWIDTH] IN BLOOD BY AUTOMATED COUNT: 16.5 % (ref 11.5–14.5)
GFR SERPL CREATININE-BSD FRML MDRD: 55 ML/MIN/1.73M2
GFR SERPL CREATININE-BSD FRML MDRD: 85 ML/MIN/1.73M2
GLUCOSE BLD STRIP.AUTO-MCNC: 151 MG/DL (ref 70–108)
GLUCOSE SERPL-MCNC: 119 MG/DL (ref 74–109)
GLUCOSE SERPL-MCNC: 124 MG/DL (ref 74–109)
HCT VFR BLD AUTO: 30.7 % (ref 37–47)
HCT VFR BLD AUTO: 31 % (ref 37–47)
HGB BLD-MCNC: 10 GM/DL (ref 12–16)
HGB BLD-MCNC: 9.8 GM/DL (ref 12–16)
INR PPP: 1.1 (ref 0.85–1.13)
INR PPP: 1.15 (ref 0.85–1.13)
MAGNESIUM SERPL-MCNC: 1.8 MG/DL (ref 1.6–2.6)
MCH RBC QN AUTO: 30.5 PG (ref 26–33)
MCH RBC QN AUTO: 30.7 PG (ref 26–33)
MCHC RBC AUTO-ENTMCNC: 31.6 GM/DL (ref 32.2–35.5)
MCHC RBC AUTO-ENTMCNC: 32.6 GM/DL (ref 32.2–35.5)
MCV RBC AUTO: 94.2 FL (ref 81–99)
MCV RBC AUTO: 96.6 FL (ref 81–99)
PHOSPHATE SERPL-MCNC: 2.4 MG/DL (ref 2.5–4.5)
PLATELET # BLD AUTO: 233 THOU/MM3 (ref 130–400)
PLATELET # BLD AUTO: 247 THOU/MM3 (ref 130–400)
PMV BLD AUTO: 9.1 FL (ref 9.4–12.4)
PMV BLD AUTO: 9.1 FL (ref 9.4–12.4)
POTASSIUM SERPL-SCNC: 4.1 MEQ/L (ref 3.5–5.2)
POTASSIUM SERPL-SCNC: 4.1 MEQ/L (ref 3.5–5.2)
PROTHROMBIN TIME: 12.6 SECONDS (ref 10–13.5)
PROTHROMBIN TIME: 13.1 SECONDS (ref 10–13.5)
RBC # BLD AUTO: 3.21 MILL/MM3 (ref 4.2–5.4)
RBC # BLD AUTO: 3.26 MILL/MM3 (ref 4.2–5.4)
SODIUM SERPL-SCNC: 136 MEQ/L (ref 135–145)
SODIUM SERPL-SCNC: 138 MEQ/L (ref 135–145)
TROPONIN, HIGH SENSITIVITY: 166 NG/L (ref 0–12)
WBC # BLD AUTO: 6.9 THOU/MM3 (ref 4.8–10.8)
WBC # BLD AUTO: 7.9 THOU/MM3 (ref 4.8–10.8)

## 2025-06-11 PROCEDURE — 84100 ASSAY OF PHOSPHORUS: CPT

## 2025-06-11 PROCEDURE — 33016 PERICARDIOCENTESIS W/IMAGING: CPT

## 2025-06-11 PROCEDURE — 99233 SBSQ HOSP IP/OBS HIGH 50: CPT | Performed by: NURSE PRACTITIONER

## 2025-06-11 PROCEDURE — 83735 ASSAY OF MAGNESIUM: CPT

## 2025-06-11 PROCEDURE — 93321 DOPPLER ECHO F-UP/LMTD STD: CPT | Performed by: INTERNAL MEDICINE

## 2025-06-11 PROCEDURE — 85730 THROMBOPLASTIN TIME PARTIAL: CPT

## 2025-06-11 PROCEDURE — 99152 MOD SED SAME PHYS/QHP 5/>YRS: CPT | Performed by: INTERNAL MEDICINE

## 2025-06-11 PROCEDURE — 0W9D3ZZ DRAINAGE OF PERICARDIAL CAVITY, PERCUTANEOUS APPROACH: ICD-10-PCS | Performed by: INTERNAL MEDICINE

## 2025-06-11 PROCEDURE — 93308 TTE F-UP OR LMTD: CPT | Performed by: INTERNAL MEDICINE

## 2025-06-11 PROCEDURE — C1894 INTRO/SHEATH, NON-LASER: HCPCS | Performed by: INTERNAL MEDICINE

## 2025-06-11 PROCEDURE — 93306 TTE W/DOPPLER COMPLETE: CPT

## 2025-06-11 PROCEDURE — 93010 ELECTROCARDIOGRAM REPORT: CPT | Performed by: INTERNAL MEDICINE

## 2025-06-11 PROCEDURE — 36415 COLL VENOUS BLD VENIPUNCTURE: CPT

## 2025-06-11 PROCEDURE — 93325 DOPPLER ECHO COLOR FLOW MAPG: CPT | Performed by: INTERNAL MEDICINE

## 2025-06-11 PROCEDURE — 99291 CRITICAL CARE FIRST HOUR: CPT | Performed by: INTERNAL MEDICINE

## 2025-06-11 PROCEDURE — 82330 ASSAY OF CALCIUM: CPT

## 2025-06-11 PROCEDURE — 6360000002 HC RX W HCPCS: Performed by: INTERNAL MEDICINE

## 2025-06-11 PROCEDURE — 2580000003 HC RX 258: Performed by: INTERNAL MEDICINE

## 2025-06-11 PROCEDURE — 2000000000 HC ICU R&B

## 2025-06-11 PROCEDURE — 6370000000 HC RX 637 (ALT 250 FOR IP): Performed by: INTERNAL MEDICINE

## 2025-06-11 PROCEDURE — 93306 TTE W/DOPPLER COMPLETE: CPT | Performed by: INTERNAL MEDICINE

## 2025-06-11 PROCEDURE — 33017 PRCRD DRG 6YR+ W/O CGEN CAR: CPT | Performed by: INTERNAL MEDICINE

## 2025-06-11 PROCEDURE — 80048 BASIC METABOLIC PNL TOTAL CA: CPT

## 2025-06-11 PROCEDURE — 93321 DOPPLER ECHO F-UP/LMTD STD: CPT

## 2025-06-11 PROCEDURE — C1769 GUIDE WIRE: HCPCS | Performed by: INTERNAL MEDICINE

## 2025-06-11 PROCEDURE — 82948 REAGENT STRIP/BLOOD GLUCOSE: CPT

## 2025-06-11 PROCEDURE — 33016 PERICARDIOCENTESIS W/IMAGING: CPT | Performed by: INTERNAL MEDICINE

## 2025-06-11 PROCEDURE — 85027 COMPLETE CBC AUTOMATED: CPT

## 2025-06-11 PROCEDURE — 2500000003 HC RX 250 WO HCPCS: Performed by: INTERNAL MEDICINE

## 2025-06-11 PROCEDURE — C1729 CATH, DRAINAGE: HCPCS | Performed by: INTERNAL MEDICINE

## 2025-06-11 PROCEDURE — 6360000002 HC RX W HCPCS

## 2025-06-11 PROCEDURE — 93005 ELECTROCARDIOGRAM TRACING: CPT | Performed by: INTERNAL MEDICINE

## 2025-06-11 PROCEDURE — 84484 ASSAY OF TROPONIN QUANT: CPT

## 2025-06-11 PROCEDURE — 93005 ELECTROCARDIOGRAM TRACING: CPT

## 2025-06-11 PROCEDURE — 85610 PROTHROMBIN TIME: CPT

## 2025-06-11 RX ORDER — ALBUMIN (HUMAN) 12.5 G/50ML
25 SOLUTION INTRAVENOUS 4 TIMES DAILY
Status: DISPENSED | OUTPATIENT
Start: 2025-06-11 | End: 2025-06-12

## 2025-06-11 RX ORDER — MIDAZOLAM HYDROCHLORIDE 1 MG/ML
INJECTION, SOLUTION INTRAMUSCULAR; INTRAVENOUS PRN
Status: DISCONTINUED | OUTPATIENT
Start: 2025-06-11 | End: 2025-06-11 | Stop reason: HOSPADM

## 2025-06-11 RX ORDER — MORPHINE SULFATE 2 MG/ML
2 INJECTION, SOLUTION INTRAMUSCULAR; INTRAVENOUS
Status: DISCONTINUED | OUTPATIENT
Start: 2025-06-11 | End: 2025-06-18 | Stop reason: HOSPADM

## 2025-06-11 RX ORDER — FENTANYL CITRATE 50 UG/ML
INJECTION, SOLUTION INTRAMUSCULAR; INTRAVENOUS PRN
Status: DISCONTINUED | OUTPATIENT
Start: 2025-06-11 | End: 2025-06-11 | Stop reason: HOSPADM

## 2025-06-11 RX ORDER — FENTANYL CITRATE 50 UG/ML
50 INJECTION, SOLUTION INTRAMUSCULAR; INTRAVENOUS ONCE
Status: COMPLETED | OUTPATIENT
Start: 2025-06-11 | End: 2025-06-11

## 2025-06-11 RX ORDER — LIDOCAINE HYDROCHLORIDE 20 MG/ML
INJECTION, SOLUTION EPIDURAL; INFILTRATION; INTRACAUDAL; PERINEURAL PRN
Status: DISCONTINUED | OUTPATIENT
Start: 2025-06-11 | End: 2025-06-11 | Stop reason: HOSPADM

## 2025-06-11 RX ADMIN — SODIUM CHLORIDE: 0.9 INJECTION, SOLUTION INTRAVENOUS at 21:44

## 2025-06-11 RX ADMIN — OXYCODONE HYDROCHLORIDE AND ACETAMINOPHEN 1000 MG: 500 TABLET ORAL at 09:15

## 2025-06-11 RX ADMIN — SODIUM CHLORIDE, PRESERVATIVE FREE 10 ML: 5 INJECTION INTRAVENOUS at 21:53

## 2025-06-11 RX ADMIN — Medication 400 UNITS: at 09:16

## 2025-06-11 RX ADMIN — ATORVASTATIN CALCIUM 80 MG: 80 TABLET, FILM COATED ORAL at 18:00

## 2025-06-11 RX ADMIN — ASPIRIN 81 MG: 81 TABLET, CHEWABLE ORAL at 09:16

## 2025-06-11 RX ADMIN — FENTANYL CITRATE 50 MCG: 50 INJECTION INTRAMUSCULAR; INTRAVENOUS at 16:24

## 2025-06-11 RX ADMIN — MORPHINE SULFATE 2 MG: 2 INJECTION, SOLUTION INTRAMUSCULAR; INTRAVENOUS at 14:43

## 2025-06-11 RX ADMIN — CLOPIDOGREL BISULFATE 75 MG: 75 TABLET, FILM COATED ORAL at 09:16

## 2025-06-11 RX ADMIN — ONDANSETRON 4 MG: 2 INJECTION, SOLUTION INTRAMUSCULAR; INTRAVENOUS at 16:57

## 2025-06-11 RX ADMIN — MORPHINE SULFATE 2 MG: 2 INJECTION, SOLUTION INTRAMUSCULAR; INTRAVENOUS at 15:47

## 2025-06-11 RX ADMIN — SODIUM CHLORIDE: 0.9 INJECTION, SOLUTION INTRAVENOUS at 00:05

## 2025-06-11 RX ADMIN — FERROUS SULFATE TAB 325 MG (65 MG ELEMENTAL FE) 325 MG: 325 (65 FE) TAB at 09:16

## 2025-06-11 ASSESSMENT — PAIN DESCRIPTION - PAIN TYPE
TYPE: SURGICAL PAIN
TYPE: SURGICAL PAIN

## 2025-06-11 ASSESSMENT — PAIN DESCRIPTION - ORIENTATION
ORIENTATION: LEFT
ORIENTATION: LEFT

## 2025-06-11 ASSESSMENT — PAIN DESCRIPTION - DESCRIPTORS
DESCRIPTORS: ACHING
DESCRIPTORS: ACHING;SHARP

## 2025-06-11 ASSESSMENT — PAIN DESCRIPTION - ONSET: ONSET: ON-GOING

## 2025-06-11 ASSESSMENT — PAIN SCALES - GENERAL
PAINLEVEL_OUTOF10: 3
PAINLEVEL_OUTOF10: 10
PAINLEVEL_OUTOF10: 8
PAINLEVEL_OUTOF10: 9

## 2025-06-11 ASSESSMENT — PAIN - FUNCTIONAL ASSESSMENT: PAIN_FUNCTIONAL_ASSESSMENT: PREVENTS OR INTERFERES WITH ALL ACTIVE AND SOME PASSIVE ACTIVITIES

## 2025-06-11 ASSESSMENT — PAIN DESCRIPTION - LOCATION
LOCATION: CHEST
LOCATION: CHEST

## 2025-06-11 ASSESSMENT — PAIN DESCRIPTION - FREQUENCY
FREQUENCY: CONTINUOUS
FREQUENCY: CONTINUOUS

## 2025-06-11 NOTE — PROCEDURES
PROCEDURE NOTE  Date: 6/11/2025   Name: Marlene Ray  YOB: 1940    Procedures  12 lead EKG completed. Results handed to Erwin STRAUSS.

## 2025-06-11 NOTE — PLAN OF CARE
Problem: Discharge Planning  Goal: Discharge to home or other facility with appropriate resources  6/11/2025 1523 by Oscar Kinney RN  Outcome: Progressing  6/11/2025 0500 by Bozena Lamar RN  Outcome: Progressing  Flowsheets (Taken 6/11/2025 0500)  Discharge to home or other facility with appropriate resources:   Identify barriers to discharge with patient and caregiver   Arrange for needed discharge resources and transportation as appropriate     Problem: Pain  Goal: Verbalizes/displays adequate comfort level or baseline comfort level  6/11/2025 1523 by Oscar Kinney RN  Outcome: Progressing  Flowsheets (Taken 6/11/2025 1500)  Verbalizes/displays adequate comfort level or baseline comfort level: Encourage patient to monitor pain and request assistance  6/11/2025 0500 by Bozena Lamar RN  Outcome: Progressing  Flowsheets (Taken 6/11/2025 0500)  Verbalizes/displays adequate comfort level or baseline comfort level:   Encourage patient to monitor pain and request assistance   Administer analgesics based on type and severity of pain and evaluate response   Assess pain using appropriate pain scale   Implement non-pharmacological measures as appropriate and evaluate response  Note: Ongoing assessment & interventions provided throughout shift.  Reminded patient to report any pain, pressure, or shortness of breath to the nurse.  Pain medications provided per physician's orders.      Problem: Safety - Adult  Goal: Free from fall injury  6/11/2025 1523 by Oscar Kinney RN  Outcome: Progressing  6/11/2025 0500 by Bozena Lamar RN  Outcome: Progressing  Flowsheets (Taken 6/11/2025 0500)  Free From Fall Injury: Instruct family/caregiver on patient safety  Note: Bed locked & in low position, call light in reach, side-rails up x2, bed/chair alarm utilized, non-slip socks on when ambulating, reminded patient to use call light to call for assistance.      Problem: ABCDS Injury Assessment  Goal: Absence of

## 2025-06-11 NOTE — H&P
[facility]  Sedation/Analgesia History & Physical    Pt Name: Marlene Ray  Account number: [id number]  MRN: [id number]  YOB: 1940  Provider Performing Procedure: Raffi Rodriguez MD MD  Referring Provider: Raffi Rodriguez MD   Primary Care Physician: Emanuel Michael DO  Date: 6/11/2025    PRE-PROCEDURE    Code Status: FULL CODE  Brief History/Pre-Procedure Diagnosis:   Cardiac tamponade    Consent: : I have discussed with the patient risks, benefits, and alternatives (and relevant risks, benefits, and side effects related to alternatives or not receiving care), and likelihood of the success.   The patient and/or representative appear to understand and agree to proceed.  The discussion encompasses risks, benefits, and side effects related to the alternatives and the risks related to not receiving the proposed care, treatment, and services.     The indication, risks and benefits of the procedure and possible therapeutic consequences and alternatives were discussed with the patient. The patient was given the opportunity to ask questions and to have them answered to his/her satisfaction. Risks of the procedure include but are not limited to the following: Bleeding, hematoma including retroperitoneal hemmorhage, infection, pain and discomfort, injury to the aorta and other blood vessels, rhythm disturbance, low blood pressure, myocardial infarction, stroke, kidney damage/failure, myocardial perforation, allergic reactions to sedatives/contrast material, loss of pulse/vascular compromise requiring surgery, aneurysm/pseudoaneurysm formation, possible loss of a limb/hand/leg, needing blood transfusion, requiring emergent open heart surgery or emergent coronary intervention, the need for intubation/respiratory support, the requirement for defibrillation/cardioversion, and death. Alternatives to and omission of the suggested procedure were discussed. The patient had no further questions

## 2025-06-11 NOTE — CARE COORDINATION
6/11/25, 2:35 PM EDT    DISCHARGE ON GOING EVALUATION    Marlene RIVERA [facility] day: 1  Location: -02/002-A Reason for admit: Severe aortic stenosis [I35.0]     Procedures:   6/10 TAVR procedure with Dr. Rodriguez.   6/11 ECHO: EF 55-60%. Aortic Valve: Appropriately positioned transcatheter bioprosthetic valve that is well-seated. Right Atrium: Right atrium is moderately dilated. Pericardium: Small (<1 cm) pericardial effusion present. No indication of cardiac tamponade.   6/11 Pericardiocentesis with Dr. Rodriguez: 200 mL blood removed.   6/11 Repeat Echo: results pending.     Imaging since last note: None    Barriers to Discharge: Cardiology following. POD #1 TAVR. Per nursing, pt became unresponsive. Pt was taken urgently to cath lab. Pericardiocentesis completed, see above. Taken to ICU post procedure.     PCP: Emanuel Michael DO  Readmission Risk Score: 12.6    Patient Goals/Plan/Treatment Preferences: From home alone, good family support. Family has arrangements made for someone to stay with pt when discharged. Monitor for possible needs.     Pt transferred to ICU, handoff report given to SY De La Torre CM.

## 2025-06-11 NOTE — H&P
Patient:  Marlene Ray    Unit/Bed:4D-02/002-A  MRN: [id number]   PCP: Emanuel Michael DO  Date of Admission: 6/10/2025    ICU Consult Note     Assessment and Plan (All pulmonary edema, renal failure, PE, and respiratory failure diagnoses are acute in nature unless otherwise specified)     Pericardial effusion with cardiac tamponade: Hypotensive and unresponsive after removal of TVP, rapid response was called.  Stat echo showed pericardial effusion causing right ventricular pain.  Urgently taken to the Cath Lab.  S/p pericardiocentesis with Dr. Rodriguez 200 cc fluid removed.  Monitor drain output  Drain to gravity, drain every 8 hours with 50 cc syringe until no further fluid withdrawn  Must be draining less than 50 cc of total output in 24 hours for 2 consecutive days prior to removal  Morphine 2 mg every hour as needed for pain control  Plan for TTE prior to discharge  Call cardiology with any concern of hemodynamic instability  Severe symptomatic aortic stenosis s/p TAVR: Patient underwent TAVR on 6/10/2025.    CAD, ICM: S/p PCI to LCx on 5/12/2025.  S/p PCI to RCA on 5/30/2025.  Home medications include aspirin and Plavix.  Currently held for 24 hours post procedure.  Paroxysmal atrial fibrillation: [id number]: 6. Was taking Warfarin previously, stopped taking 2/2025 due to GIB. Referred for Watchman.  Hypertension: Home medications include amlodipine 2.5 mg daily, Toprol-XL 50 mg daily, lisinopril 20 mg daily.    Tobacco abuse: Smokes 1/2 PPD, 32.7-pack-year history.      CC: Pericardial effusion  HPI: Marlene Ray is a 85-year-old female with past medical history of severe symptomatic aortic stenosis s/p TAVR, paroxysmal atrial fibrillation, CAD, hypertension, tobacco abuse who presented to [facility] for [facility]R on 6/10.  Patient was planned for discharge and transvenous pacer was removed.  She had become hypotensive and unresponsive after removal of TVP and a rapid response was called.  Stat echo showed

## 2025-06-11 NOTE — PROGRESS NOTES
1309 Britt Donis in to remove patient's R IJ sheath. Patient unresponsive and diaphoretic. Rapid response called. BP 74/96. HR 91. Bilateral groin sites soft and without bleeding or hematoma. Blood sugar 151. Fluids started    1312 BP 83/56    1315 BP 97/88 HR 92 O2 applied at 3LNC  Echo tech at bedside    1320 /62    1325 /74. Dr Rodriguez at bedside.     1335 patient taken to cath lab    1400 report called to Hollie in ICU. Belongings sent to 4D and family updated.

## 2025-06-11 NOTE — BRIEF OP NOTE
[facility]  Sedation/Analgesia Post Sedation Record    Pt Name: Marlene Ray  Account number: [id number]  MRN: [id number]  YOB: 1940  Procedure Performed By: Raffi Rodriguez MD MD FACC, FSCAI, RPVI  Primary Care Physician: Emanuel Michael,   Date: 6/11/2025    POST-PROCEDURE    Physicians/Assistants: Raffi Rodriguez MD, GHAZAL, American Hospital AssociationCODY, RPVI    Procedure Performed: Pericardiocentesis    Sedation/Anesthesia: Versed/ Fentanyl and 2% xylocaine local anesthesia.      Estimated Blood Loss: < 50 ml.     Specimens Removed: None         Disposition of Specimen: N/A        Complications: No Immediate Complications.       Post-procedure Diagnosis/Findings:       S/p Pericardiocentesis with 200 cc of blood removed      Electronically signed by Raffi Rodriguez MD on 6/11/25 at 2:08 PM EDT   Interventional Cardiology

## 2025-06-11 NOTE — PROGRESS NOTES
Patient admitted to the floor. BP systolic 200s. 10/10 pain in chest. Morphine given. . Resting in bed with eyes closed.

## 2025-06-11 NOTE — PLAN OF CARE
Problem: Discharge Planning  Goal: Discharge to home or other facility with appropriate resources  Outcome: Progressing  Flowsheets (Taken 6/11/2025 0500)  Discharge to home or other facility with appropriate resources:   Identify barriers to discharge with patient and caregiver   Arrange for needed discharge resources and transportation as appropriate     Problem: Pain  Goal: Verbalizes/displays adequate comfort level or baseline comfort level  Outcome: Progressing  Flowsheets (Taken 6/11/2025 0500)  Verbalizes/displays adequate comfort level or baseline comfort level:   Encourage patient to monitor pain and request assistance   Administer analgesics based on type and severity of pain and evaluate response   Assess pain using appropriate pain scale   Implement non-pharmacological measures as appropriate and evaluate response  Note: Ongoing assessment & interventions provided throughout shift.  Reminded patient to report any pain, pressure, or shortness of breath to the nurse.  Pain medications provided per physician's orders.      Problem: Safety - Adult  Goal: Free from fall injury  Outcome: Progressing  Flowsheets (Taken 6/11/2025 0500)  Free From Fall Injury: Instruct family/caregiver on patient safety  Note: Bed locked & in low position, call light in reach, side-rails up x2, bed/chair alarm utilized, non-slip socks on when ambulating, reminded patient to use call light to call for assistance.      Problem: ABCDS Injury Assessment  Goal: Absence of physical injury  Outcome: Progressing  Flowsheets (Taken 6/11/2025 0500)  Absence of Physical Injury: Implement safety measures based on patient assessment  Note: Bed locked & in low position, call light in reach, side-rails up x2, bed/chair alarm utilized, non-slip socks on when ambulating, reminded patient to use call light to call for assistance.      Problem: Cardiovascular - Adult  Goal: Maintains optimal cardiac output and hemodynamic stability  Outcome:  nasal continuous positive airway pressure, respiratory therapy assess nares and determine need for appliance change or resting period  Outcome: Progressing  Flowsheets (Taken 6/11/2025 0500)  Skin Integrity Remains Intact: Monitor for areas of redness and/or skin breakdown  Note: Ongoing assessment & interventions provided throughout shift.  Skin assessments provided.  Encouraging/assisting patient to turn as needed.    Care plan reviewed with patient.  Patient verbalizes understanding of the care plan and contributed to goal setting.

## 2025-06-11 NOTE — PROCEDURES
PROCEDURE NOTE  Date: 6/11/2025   Name: Marlene MIGUEL Ray  YOB: 1940    Procedures EKG completed, given to

## 2025-06-11 NOTE — PROGRESS NOTES
Per cardiology remaining 3 doses of albumin may be held at this time unless needed for pressure support.

## 2025-06-11 NOTE — PROCEDURES
PROCEDURE NOTE  Date: 6/11/2025   Name: Marlene Ray  YOB: 1940    Procedures        EKG was completed and handed to RN

## 2025-06-11 NOTE — PROGRESS NOTES
Inpatient Cardiac Rehabilitation Consult    Received consult for Phase II Cardiac Rehabilitation.  Patient needs cardiac rehab due to TAVR on 6/10/25.  Importance of Cardiac Rehab discussed with patient.  Reviewed cardiac rehab class times.  Patient questions answered. Sent referral to [facility]. Cardiac Rehab brochure given.

## 2025-06-11 NOTE — PROGRESS NOTES
Structural Heart/Cardiology Progress Note    2025 5:16 PM    Procedure:  TAVR        POD #:  1    Subjective:    Resting in bed w/ no concerns  Eating, drinking urinating  Has not been up walking yet  Denies pain     Vital Signs: BP (!) 188/63   Pulse (!) 103   Temp 98.3 °F (36.8 °C)   Resp 20   Ht 1.626 m (5' 4\")   Wt 58.4 kg (128 lb 12 oz)   SpO2 93%   BMI 22.10 kg/m²    Temp (24hrs), Av.2 °F (36.8 °C), Min:97.9 °F (36.6 °C), Max:98.4 °F (36.9 °C)      Weight - Scale: 58.4 kg (128 lb 12 oz)       PULSE OXIMETRY RANGE: SpO2  Av.2 %  Min: 88 %  Max: 98 %  SUPPLEMENTAL O2: O2 Flow Rate (L/min): 2 L/min     Labs:   CBC:     Recent Labs     06/10/25  0830 25  0548 25  1329   WBC 5.5 6.9 7.9   HGB 10.7* 10.0* 9.8*   HCT 33.5* 30.7* 31.0*   MCV 94.4 94.2 96.6    247 233     BMP:  Recent Labs     06/10/25  0829 25  0548 25  1329    138 136   K 4.4 4.1 4.1    105 104   CO2 25 24 22   PHOS  --   --  2.4*   BUN 15 10 10   CREATININE 0.8 0.7 1.0*   MG  --   --  1.8     Last HgA1C:  No results found for: \"LABA1C\"  PT/INR:   Recent Labs     06/10/25  0830 25  0548 25  1329   PROTIME 11.5 12.6 13.1   INR 1.01 1.10 1.15*     APTT:   Recent Labs     06/10/25  0830 25  0548 25  1329   APTT 32.1 30.6 29.8         Intake/Output Summary (Last 24 hours) at 2025 1716  Last data filed at 2025 1414  Gross per 24 hour   Intake 1745.74 ml   Output 600 ml   Net 1145.74 ml       Scheduled Meds:    albumin human 25%  25 g IntraVENous 4x daily    sodium chloride flush  5-40 mL IntraVENous 2 times per day    vitamin C  1,000 mg Oral Daily    vitamin E  400 Units Oral Daily    [Held by provider] aspirin  81 mg Oral Daily    atorvastatin  80 mg Oral QPM    ferrous sulfate  325 mg Oral Daily    [Held by provider] clopidogrel  75 mg Oral Daily       ROS: All neg unless specifically mentioned in subjective section.     Exam:   General Appearance: alert

## 2025-06-12 ENCOUNTER — APPOINTMENT (OUTPATIENT)
Age: 85
DRG: 267 | End: 2025-06-12
Attending: INTERNAL MEDICINE
Payer: MEDICARE

## 2025-06-12 ENCOUNTER — APPOINTMENT (OUTPATIENT)
Dept: GENERAL RADIOLOGY | Age: 85
DRG: 267 | End: 2025-06-12
Attending: INTERNAL MEDICINE
Payer: MEDICARE

## 2025-06-12 LAB
ANION GAP SERPL CALC-SCNC: 8 MEQ/L (ref 8–16)
BASOPHILS ABSOLUTE: 0 THOU/MM3 (ref 0–0.1)
BASOPHILS NFR BLD AUTO: 0.2 %
BUN SERPL-MCNC: 11 MG/DL (ref 8–23)
CALCIUM SERPL-MCNC: 8.6 MG/DL (ref 8.8–10.2)
CHLORIDE SERPL-SCNC: 103 MEQ/L (ref 98–111)
CO2 SERPL-SCNC: 24 MEQ/L (ref 22–29)
CREAT SERPL-MCNC: 0.8 MG/DL (ref 0.5–0.9)
CRP SERPL-MCNC: 8.95 MG/DL (ref 0–0.5)
DEPRECATED RDW RBC AUTO: 57.6 FL (ref 35–45)
ECHO BSA: 1.63 M2
ECHO EST RA PRESSURE: 3 MMHG
ECHO LV EF PHYSICIAN: 60 %
ECHO LV FRACTIONAL SHORTENING: 30 % (ref 28–44)
ECHO LV INTERNAL DIMENSION DIASTOLE INDEX: 2.18 CM/M2
ECHO LV INTERNAL DIMENSION DIASTOLIC: 3.7 CM (ref 3.9–5.3)
ECHO LV INTERNAL DIMENSION SYSTOLIC INDEX: 1.53 CM/M2
ECHO LV INTERNAL DIMENSION SYSTOLIC: 2.6 CM
ECHO LV IVSD: 0.9 CM (ref 0.6–0.9)
ECHO LV MASS 2D: 138.2 G (ref 67–162)
ECHO LV MASS INDEX 2D: 81.3 G/M2 (ref 43–95)
ECHO LV POSTERIOR WALL DIASTOLIC: 1.4 CM (ref 0.6–0.9)
ECHO LV RELATIVE WALL THICKNESS RATIO: 0.76
ECHO RV INTERNAL DIMENSION: 3.1 CM
EKG ATRIAL RATE: 91 BPM
EKG ATRIAL RATE: 91 BPM
EKG P AXIS: 31 DEGREES
EKG P AXIS: 47 DEGREES
EKG P-R INTERVAL: 186 MS
EKG P-R INTERVAL: 194 MS
EKG Q-T INTERVAL: 366 MS
EKG Q-T INTERVAL: 382 MS
EKG QRS DURATION: 120 MS
EKG QRS DURATION: 122 MS
EKG QTC CALCULATION (BAZETT): 450 MS
EKG QTC CALCULATION (BAZETT): 469 MS
EKG R AXIS: -4 DEGREES
EKG R AXIS: 0 DEGREES
EKG T AXIS: 101 DEGREES
EKG T AXIS: 86 DEGREES
EKG VENTRICULAR RATE: 91 BPM
EKG VENTRICULAR RATE: 91 BPM
EOSINOPHIL NFR BLD AUTO: 0 %
EOSINOPHILS ABSOLUTE: 0 THOU/MM3 (ref 0–0.4)
ERYTHROCYTE [DISTWIDTH] IN BLOOD BY AUTOMATED COUNT: 16.6 % (ref 11.5–14.5)
ERYTHROCYTE [SEDIMENTATION RATE] IN BLOOD BY WESTERGREN METHOD: 38 MM/HR (ref 0–20)
GFR SERPL CREATININE-BSD FRML MDRD: 72 ML/MIN/1.73M2
GLUCOSE SERPL-MCNC: 120 MG/DL (ref 74–109)
HCT VFR BLD AUTO: 24.6 % (ref 37–47)
HCT VFR BLD AUTO: 25.3 % (ref 37–47)
HGB BLD-MCNC: 7.9 GM/DL (ref 12–16)
HGB BLD-MCNC: 8.2 GM/DL (ref 12–16)
IMM GRANULOCYTES # BLD AUTO: 0.06 THOU/MM3 (ref 0–0.07)
IMM GRANULOCYTES NFR BLD AUTO: 0.4 %
LYMPHOCYTES ABSOLUTE: 1 THOU/MM3 (ref 1–4.8)
LYMPHOCYTES NFR BLD AUTO: 7.4 %
MAGNESIUM SERPL-MCNC: 1.6 MG/DL (ref 1.6–2.6)
MCH RBC QN AUTO: 30.6 PG (ref 26–33)
MCHC RBC AUTO-ENTMCNC: 32.4 GM/DL (ref 32.2–35.5)
MCV RBC AUTO: 94.4 FL (ref 81–99)
MONOCYTES ABSOLUTE: 1.1 THOU/MM3 (ref 0.4–1.3)
MONOCYTES NFR BLD AUTO: 8.4 %
NEUTROPHILS ABSOLUTE: 11.4 THOU/MM3 (ref 1.8–7.7)
NEUTROPHILS NFR BLD AUTO: 83.6 %
NRBC BLD AUTO-RTO: 0 /100 WBC
PLATELET # BLD AUTO: 206 THOU/MM3 (ref 130–400)
PMV BLD AUTO: 9.4 FL (ref 9.4–12.4)
POTASSIUM SERPL-SCNC: 3.9 MEQ/L (ref 3.5–5.2)
RBC # BLD AUTO: 2.68 MILL/MM3 (ref 4.2–5.4)
SODIUM SERPL-SCNC: 135 MEQ/L (ref 135–145)
TROPONIN, HIGH SENSITIVITY: 157 NG/L (ref 0–12)
WBC # BLD AUTO: 13.6 THOU/MM3 (ref 4.8–10.8)

## 2025-06-12 PROCEDURE — 6370000000 HC RX 637 (ALT 250 FOR IP): Performed by: INTERNAL MEDICINE

## 2025-06-12 PROCEDURE — 36415 COLL VENOUS BLD VENIPUNCTURE: CPT

## 2025-06-12 PROCEDURE — 6370000000 HC RX 637 (ALT 250 FOR IP): Performed by: NURSE PRACTITIONER

## 2025-06-12 PROCEDURE — P9047 ALBUMIN (HUMAN), 25%, 50ML: HCPCS | Performed by: NURSE PRACTITIONER

## 2025-06-12 PROCEDURE — 93321 DOPPLER ECHO F-UP/LMTD STD: CPT | Performed by: INTERNAL MEDICINE

## 2025-06-12 PROCEDURE — 6360000002 HC RX W HCPCS: Performed by: NURSE PRACTITIONER

## 2025-06-12 PROCEDURE — 93325 DOPPLER ECHO COLOR FLOW MAPG: CPT | Performed by: INTERNAL MEDICINE

## 2025-06-12 PROCEDURE — 2500000003 HC RX 250 WO HCPCS: Performed by: INTERNAL MEDICINE

## 2025-06-12 PROCEDURE — 2580000003 HC RX 258: Performed by: INTERNAL MEDICINE

## 2025-06-12 PROCEDURE — 99232 SBSQ HOSP IP/OBS MODERATE 35: CPT | Performed by: NURSE PRACTITIONER

## 2025-06-12 PROCEDURE — 2100000000 HC CCU R&B

## 2025-06-12 PROCEDURE — 86140 C-REACTIVE PROTEIN: CPT

## 2025-06-12 PROCEDURE — 85018 HEMOGLOBIN: CPT

## 2025-06-12 PROCEDURE — 93005 ELECTROCARDIOGRAM TRACING: CPT | Performed by: INTERNAL MEDICINE

## 2025-06-12 PROCEDURE — 93005 ELECTROCARDIOGRAM TRACING: CPT

## 2025-06-12 PROCEDURE — 80048 BASIC METABOLIC PNL TOTAL CA: CPT

## 2025-06-12 PROCEDURE — 85651 RBC SED RATE NONAUTOMATED: CPT

## 2025-06-12 PROCEDURE — 93308 TTE F-UP OR LMTD: CPT

## 2025-06-12 PROCEDURE — 99291 CRITICAL CARE FIRST HOUR: CPT | Performed by: INTERNAL MEDICINE

## 2025-06-12 PROCEDURE — 84484 ASSAY OF TROPONIN QUANT: CPT

## 2025-06-12 PROCEDURE — 85025 COMPLETE CBC W/AUTO DIFF WBC: CPT

## 2025-06-12 PROCEDURE — 83735 ASSAY OF MAGNESIUM: CPT

## 2025-06-12 PROCEDURE — 93308 TTE F-UP OR LMTD: CPT | Performed by: INTERNAL MEDICINE

## 2025-06-12 PROCEDURE — 93010 ELECTROCARDIOGRAM REPORT: CPT | Performed by: INTERNAL MEDICINE

## 2025-06-12 PROCEDURE — 6360000002 HC RX W HCPCS

## 2025-06-12 PROCEDURE — 85014 HEMATOCRIT: CPT

## 2025-06-12 PROCEDURE — 87040 BLOOD CULTURE FOR BACTERIA: CPT

## 2025-06-12 PROCEDURE — 6360000002 HC RX W HCPCS: Performed by: INTERNAL MEDICINE

## 2025-06-12 PROCEDURE — P9047 ALBUMIN (HUMAN), 25%, 50ML: HCPCS

## 2025-06-12 PROCEDURE — 71045 X-RAY EXAM CHEST 1 VIEW: CPT

## 2025-06-12 RX ORDER — FUROSEMIDE 20 MG/1
20 TABLET ORAL ONCE
Status: COMPLETED | OUTPATIENT
Start: 2025-06-12 | End: 2025-06-12

## 2025-06-12 RX ORDER — POTASSIUM CHLORIDE 1500 MG/1
20 TABLET, EXTENDED RELEASE ORAL ONCE
Status: COMPLETED | OUTPATIENT
Start: 2025-06-12 | End: 2025-06-12

## 2025-06-12 RX ORDER — KETOROLAC TROMETHAMINE 30 MG/ML
30 INJECTION, SOLUTION INTRAMUSCULAR; INTRAVENOUS ONCE
Status: COMPLETED | OUTPATIENT
Start: 2025-06-12 | End: 2025-06-12

## 2025-06-12 RX ORDER — ALBUMIN (HUMAN) 12.5 G/50ML
25 SOLUTION INTRAVENOUS
Status: COMPLETED | OUTPATIENT
Start: 2025-06-12 | End: 2025-06-12

## 2025-06-12 RX ORDER — PANTOPRAZOLE SODIUM 40 MG/1
40 TABLET, DELAYED RELEASE ORAL
Status: DISCONTINUED | OUTPATIENT
Start: 2025-06-12 | End: 2025-06-18 | Stop reason: HOSPADM

## 2025-06-12 RX ORDER — COLCHICINE 0.6 MG/1
0.6 TABLET ORAL DAILY
Status: DISCONTINUED | OUTPATIENT
Start: 2025-06-12 | End: 2025-06-16

## 2025-06-12 RX ORDER — ALBUMIN (HUMAN) 12.5 G/50ML
25 SOLUTION INTRAVENOUS ONCE
Status: COMPLETED | OUTPATIENT
Start: 2025-06-12 | End: 2025-06-12

## 2025-06-12 RX ADMIN — ALBUMIN (HUMAN) 25 G: 0.25 INJECTION, SOLUTION INTRAVENOUS at 04:39

## 2025-06-12 RX ADMIN — POLYETHYLENE GLYCOL 3350 17 G: 17 POWDER, FOR SOLUTION ORAL at 17:50

## 2025-06-12 RX ADMIN — POTASSIUM CHLORIDE 20 MEQ: 1500 TABLET, EXTENDED RELEASE ORAL at 10:58

## 2025-06-12 RX ADMIN — COLCHICINE 0.6 MG: 0.6 TABLET, FILM COATED ORAL at 21:24

## 2025-06-12 RX ADMIN — KETOROLAC TROMETHAMINE 30 MG: 30 INJECTION, SOLUTION INTRAMUSCULAR at 20:29

## 2025-06-12 RX ADMIN — ATORVASTATIN CALCIUM 80 MG: 80 TABLET, FILM COATED ORAL at 17:00

## 2025-06-12 RX ADMIN — ALBUMIN (HUMAN) 25 G: 0.25 INJECTION, SOLUTION INTRAVENOUS at 03:15

## 2025-06-12 RX ADMIN — ALBUMIN (HUMAN) 25 G: 0.25 INJECTION, SOLUTION INTRAVENOUS at 20:28

## 2025-06-12 RX ADMIN — ALBUMIN (HUMAN) 25 G: 0.25 INJECTION, SOLUTION INTRAVENOUS at 00:09

## 2025-06-12 RX ADMIN — PANTOPRAZOLE SODIUM 40 MG: 40 TABLET, DELAYED RELEASE ORAL at 20:28

## 2025-06-12 RX ADMIN — ACETAMINOPHEN 650 MG: 325 TABLET ORAL at 17:55

## 2025-06-12 RX ADMIN — HYDRALAZINE HYDROCHLORIDE 10 MG: 20 INJECTION INTRAMUSCULAR; INTRAVENOUS at 16:58

## 2025-06-12 RX ADMIN — SODIUM CHLORIDE: 0.9 INJECTION, SOLUTION INTRAVENOUS at 16:58

## 2025-06-12 RX ADMIN — ALBUMIN (HUMAN) 25 G: 0.25 INJECTION, SOLUTION INTRAVENOUS at 02:12

## 2025-06-12 RX ADMIN — FUROSEMIDE 20 MG: 20 TABLET ORAL at 21:25

## 2025-06-12 RX ADMIN — SODIUM CHLORIDE, PRESERVATIVE FREE 10 ML: 5 INJECTION INTRAVENOUS at 08:54

## 2025-06-12 RX ADMIN — SODIUM CHLORIDE, PRESERVATIVE FREE 10 ML: 5 INJECTION INTRAVENOUS at 20:32

## 2025-06-12 RX ADMIN — OXYCODONE HYDROCHLORIDE AND ACETAMINOPHEN 1000 MG: 500 TABLET ORAL at 08:53

## 2025-06-12 RX ADMIN — FERROUS SULFATE TAB 325 MG (65 MG ELEMENTAL FE) 325 MG: 325 (65 FE) TAB at 08:54

## 2025-06-12 RX ADMIN — Medication 400 UNITS: at 08:54

## 2025-06-12 ASSESSMENT — PAIN SCALES - GENERAL
PAINLEVEL_OUTOF10: 0
PAINLEVEL_OUTOF10: 3
PAINLEVEL_OUTOF10: 0
PAINLEVEL_OUTOF10: 0
PAINLEVEL_OUTOF10: 8
PAINLEVEL_OUTOF10: 0

## 2025-06-12 ASSESSMENT — PAIN DESCRIPTION - DESCRIPTORS
DESCRIPTORS: ACHING
DESCRIPTORS: ACHING

## 2025-06-12 ASSESSMENT — PAIN DESCRIPTION - ORIENTATION
ORIENTATION: MID
ORIENTATION: MID

## 2025-06-12 ASSESSMENT — PAIN DESCRIPTION - LOCATION
LOCATION: BACK
LOCATION: BACK

## 2025-06-12 ASSESSMENT — PAIN - FUNCTIONAL ASSESSMENT: PAIN_FUNCTIONAL_ASSESSMENT: PREVENTS OR INTERFERES SOME ACTIVE ACTIVITIES AND ADLS

## 2025-06-12 NOTE — PROGRESS NOTES
Patient:  Marlene Ray    Unit/Bed:4D-02/002-A  MRN: [id number]   PCP: Emanuel Michael,   Date of Admission: 6/10/2025    Assessment and Plan (All pulmonary edema, renal failure, PE, and respiratory failure diagnoses are acute in nature unless otherwise specified)     Pericardial effusion with cardiac tamponade: Hypotensive and unresponsive after removal of TVP, rapid response was called.  Stat echo showed pericardial effusion causing right ventricular pain.  Urgently taken to the Cath Lab.  S/p pericardiocentesis with Dr. Rodriguez 200 cc fluid removed.  Monitor drain output  Drain to gravity, drain every 8 hours with 50 cc syringe until no further fluid withdrawn  Must be draining less than 50 cc of total output in 24 hours for 2 consecutive days prior to removal  Pericardial drain on/off: -15 cc blood between 9413-4401, -200 cc between 2252-4896 on 6/11  Morphine 2 mg every hour as needed for pain control  TTE 6/12 shows EF 60-65%, appropriately positioned transcatheter bioprosthetic valve that is well-seated, LA mildly dilated, RA mildly dilated, small less than 1 cm pericardial effusion present with no indication of cardiac tamponade.  Call cardiology with any concern of hemodynamic instability  Severe symptomatic aortic stenosis s/p TAVR: Patient underwent TAVR on 6/10/2025.    CAD, ICM: S/p PCI to LCx on 5/12/2025.  S/p PCI to RCA on 5/30/2025.  Home medications include aspirin and Plavix.  Currently held for 24 hours post procedure.  Paroxysmal atrial fibrillation: [id number]: 6. Was taking Warfarin previously, stopped taking 2/2025 due to GIB. Referred for Watchman.  Leukocytosis: WBC 13.6, up from 7.9.  Afebrile.  Likely reactive.  Continue to monitor CBC.  Monitor for signs/symptoms of infection.  Hypertension: Home medications include amlodipine 2.5 mg daily, Toprol-XL 50 mg daily, lisinopril 20 mg daily.    Tobacco abuse: Smokes 1/2 PPD, 32.7-pack-year history.      CC: Pericardial effusion  HPI:

## 2025-06-12 NOTE — PROCEDURES
PROCEDURE NOTE  Date: 6/12/2025   Name: Marlene Ray  YOB: 1940    Procedures        EKG was completed and handed to RN

## 2025-06-12 NOTE — PROGRESS NOTES
Structural Heart/Cardiology Progress Note    2025 10:12 AM    Procedure:  TAVR        POD #:  2    Subjective:        Remains in ICU  Stable OVN with brief lower BP - Albumin  SR  Pericardial drain continues; 15 cc OVN  Drain clamped this AM with q8h aspiration attempts   CRP and ESR pending  Afebrile   WBC 13.6 (7.9, 6.9, 5.5)  Hgb 7.9 (8.2, 9.8, 10, 10.7)  K 3.9  Renal status stable  No chest discomfort today  CRP 8.95; ESR 38      Resting in bed w/ no concerns  Eating, drinking urinating  Has not been up walking yet  Denies pain     Vital Signs: /67   Pulse 95   Temp 98.4 °F (36.9 °C) (Oral)   Resp 22   Ht 1.626 m (5' 4\")   Wt 64.9 kg (143 lb 1.3 oz)   SpO2 94%   BMI 24.56 kg/m²      Temp (24hrs), Av.2 °F (36.8 °C), Min:97.9 °F (36.6 °C), Max:98.4 °F (36.9 °C)      Weight - Scale: 64.9 kg (143 lb 1.3 oz)       PULSE OXIMETRY RANGE: SpO2  Av.2 %  Min: 88 %  Max: 96 %  SUPPLEMENTAL O2: O2 Flow Rate (L/min): 2 L/min     Labs:   CBC:     Recent Labs     25  0548 25  1329 25  0134 25  0700   WBC 6.9 7.9 13.6*  --    HGB 10.0* 9.8* 8.2* 7.9*   HCT 30.7* 31.0* 25.3* 24.6*   MCV 94.2 96.6 94.4  --     233 206  --      BMP:  Recent Labs     25  0548 25  1329 25  0134    136 135   K 4.1 4.1 3.9    104 103   CO2 24 22 24   PHOS  --  2.4*  --    BUN 10 10 11   CREATININE 0.7 1.0* 0.8   MG  --  1.8  --      Last HgA1C:  No results found for: \"LABA1C\"  PT/INR:   Recent Labs     06/10/25  0830 25  0548 25  1329   PROTIME 11.5 12.6 13.1   INR 1.01 1.10 1.15*     APTT:   Recent Labs     06/10/25  0830 25  0548 25  1329   APTT 32.1 30.6 29.8         Intake/Output Summary (Last 24 hours) at 2025 1012  Last data filed at 2025 0600  Gross per 24 hour   Intake 897.22 ml   Output 475 ml   Net 422.22 ml       Scheduled Meds:    albumin human 25%  25 g IntraVENous 4x daily    sodium chloride flush  5-40 mL

## 2025-06-12 NOTE — PLAN OF CARE
Problem: Discharge Planning  Goal: Discharge to home or other facility with appropriate resources  6/12/2025 1001 by Lida Voss RN  Outcome: Progressing  Flowsheets (Taken 6/12/2025 0800)  Discharge to home or other facility with appropriate resources: Identify barriers to discharge with patient and caregiver  6/12/2025 0537 by Emani Hoffmann RN  Outcome: Progressing     Problem: Pain  Goal: Verbalizes/displays adequate comfort level or baseline comfort level  6/12/2025 1001 by Lida Voss RN  Outcome: Progressing  Flowsheets (Taken 6/12/2025 0800)  Verbalizes/displays adequate comfort level or baseline comfort level:   Encourage patient to monitor pain and request assistance   Assess pain using appropriate pain scale  6/12/2025 0537 by Emani Hoffmann RN  Outcome: Progressing     Problem: Safety - Adult  Goal: Free from fall injury  6/12/2025 1001 by Lida Voss RN  Outcome: Progressing  6/12/2025 0537 by Emani Hoffmann RN  Outcome: Progressing     Problem: ABCDS Injury Assessment  Goal: Absence of physical injury  6/12/2025 1001 by Lida Voss RN  Outcome: Progressing  6/12/2025 0537 by Emani Hoffmann RN  Outcome: Progressing     Problem: Cardiovascular - Adult  Goal: Maintains optimal cardiac output and hemodynamic stability  6/12/2025 1001 by Lida Voss RN  Outcome: Progressing  Flowsheets (Taken 6/12/2025 0800)  Maintains optimal cardiac output and hemodynamic stability:   Monitor blood pressure and heart rate   Monitor urine output and notify Licensed Independent Practitioner for values outside of normal range   Assess for signs of decreased cardiac output  6/12/2025 0537 by Emani Hoffmann RN  Outcome: Progressing  Goal: Absence of cardiac dysrhythmias or at baseline  6/12/2025 1001 by Lida Voss RN  Outcome: Progressing  Flowsheets (Taken 6/12/2025 0800)  Absence of cardiac dysrhythmias or at baseline: Monitor cardiac rate and rhythm  6/12/2025 0537 by Emani Hoffmann RN  Outcome: Progressing

## 2025-06-12 NOTE — PROCEDURES
PROCEDURE NOTE  Date: 6/12/2025   Name: Marlene Ray  YOB: 1940    Procedures  EKG completed

## 2025-06-12 NOTE — PLAN OF CARE
Problem: Discharge Planning  Goal: Discharge to home or other facility with appropriate resources  Outcome: Progressing     Problem: Pain  Goal: Verbalizes/displays adequate comfort level or baseline comfort level  Outcome: Progressing     Problem: Safety - Adult  Goal: Free from fall injury  Outcome: Progressing     Problem: ABCDS Injury Assessment  Goal: Absence of physical injury  Outcome: Progressing     Problem: Cardiovascular - Adult  Goal: Maintains optimal cardiac output and hemodynamic stability  Outcome: Progressing     Problem: Cardiovascular - Adult  Goal: Absence of cardiac dysrhythmias or at baseline  Outcome: Progressing     Problem: Chronic Conditions and Co-morbidities  Goal: Patient's chronic conditions and co-morbidity symptoms are monitored and maintained or improved  Outcome: Progressing     Problem: Skin/Tissue Integrity  Goal: Skin integrity remains intact  Description: 1.  Monitor for areas of redness and/or skin breakdown2.  Assess vascular access sites hourly3.  Every 4-6 hours minimum:  Change oxygen saturation probe site4.  Every 4-6 hours:  If on nasal continuous positive airway pressure, respiratory therapy assess nares and determine need for appliance change or resting period  Outcome: Progressing     Care plan reviewed with patient.  Patient verbalizes understanding of the plan of care and contributes to goal setting.

## 2025-06-12 NOTE — PROGRESS NOTES
Spiritual Health History and Assessment/Progress Note  [facility]    (P) Spiritual/Emotional Needs,  ,  ,      Name: Marlene Ray MRN: [id number]    Age: 85 y.o.     Sex: female   Language: English   Episcopal: None   Severe aortic stenosis     Date: 6/12/2025            Total Time Calculated: (P) 2 min              Spiritual Assessment began in [facility] ICU 4D        Referral/Consult From: (P) Rounding   Encounter Overview/Reason: (P) Spiritual/Emotional Needs  Service Provided For: (P) Patient    Melany, Belief, Meaning:   Patient unable to assess at this time  Family/Friends No family/friends present      Importance and Influence:  Patient unable to assess at this time  Family/Friends No family/friends present    Community:  Patient Other: Unable to assess as pt politely declined visit but assented to prayer in the hallway.  Family/Friends No family/friends present    Assessment and Plan of Care:     Patient Interventions include: Other:  prayed for patient in the hallway across from pt room.  Family/Friends Interventions include: No family/friends present    Patient Plan of Care: Spiritual Care available upon further referral  Family/Friends Plan of Care: No family/friends present    Electronically signed by Chaplain Ginette on 6/12/2025 at 10:10 AM

## 2025-06-13 ENCOUNTER — APPOINTMENT (OUTPATIENT)
Age: 85
DRG: 267 | End: 2025-06-13
Attending: NURSE PRACTITIONER
Payer: MEDICARE

## 2025-06-13 LAB
ABO GROUP BLD: NORMAL
ANION GAP SERPL CALC-SCNC: 11 MEQ/L (ref 8–16)
ANION GAP SERPL CALC-SCNC: 12 MEQ/L (ref 8–16)
BACTERIA URNS QL MICRO: ABNORMAL /HPF
BASOPHILS ABSOLUTE: 0 THOU/MM3 (ref 0–0.1)
BASOPHILS NFR BLD AUTO: 0.3 %
BILIRUB UR QL STRIP.AUTO: NEGATIVE
BUN SERPL-MCNC: 22 MG/DL (ref 8–23)
BUN SERPL-MCNC: 23 MG/DL (ref 8–23)
CALCIUM SERPL-MCNC: 8.7 MG/DL (ref 8.8–10.2)
CALCIUM SERPL-MCNC: 9.3 MG/DL (ref 8.8–10.2)
CASTS #/AREA URNS LPF: ABNORMAL /LPF
CASTS 2: ABNORMAL /LPF
CHARACTER UR: CLEAR
CHLORIDE SERPL-SCNC: 104 MEQ/L (ref 98–111)
CHLORIDE SERPL-SCNC: 104 MEQ/L (ref 98–111)
CO2 SERPL-SCNC: 21 MEQ/L (ref 22–29)
CO2 SERPL-SCNC: 21 MEQ/L (ref 22–29)
COLOR, UA: YELLOW
CREAT SERPL-MCNC: 0.9 MG/DL (ref 0.5–0.9)
CREAT SERPL-MCNC: 1 MG/DL (ref 0.5–0.9)
CRYSTALS URNS MICRO: ABNORMAL
DEPRECATED RDW RBC AUTO: 59.2 FL (ref 35–45)
ECHO BSA: 1.63 M2
ECHO EST RA PRESSURE: 8 MMHG
ECHO LV EF PHYSICIAN: 60 %
ECHO LV FRACTIONAL SHORTENING: 35 % (ref 28–44)
ECHO LV INTERNAL DIMENSION DIASTOLE INDEX: 2.38 CM/M2
ECHO LV INTERNAL DIMENSION DIASTOLIC: 4 CM (ref 3.9–5.3)
ECHO LV INTERNAL DIMENSION SYSTOLIC INDEX: 1.55 CM/M2
ECHO LV INTERNAL DIMENSION SYSTOLIC: 2.6 CM
ECHO LV IVSD: 1.3 CM (ref 0.6–0.9)
ECHO LV MASS 2D: 209 G (ref 67–162)
ECHO LV MASS INDEX 2D: 124.4 G/M2 (ref 43–95)
ECHO LV POSTERIOR WALL DIASTOLIC: 1.5 CM (ref 0.6–0.9)
ECHO LV RELATIVE WALL THICKNESS RATIO: 0.75
ECHO RV INTERNAL DIMENSION: 2.4 CM
EKG ATRIAL RATE: 90 BPM
EKG P AXIS: 47 DEGREES
EKG P-R INTERVAL: 216 MS
EKG Q-T INTERVAL: 364 MS
EKG QRS DURATION: 122 MS
EKG QTC CALCULATION (BAZETT): 445 MS
EKG R AXIS: 2 DEGREES
EKG T AXIS: 93 DEGREES
EKG VENTRICULAR RATE: 90 BPM
EOSINOPHIL NFR BLD AUTO: 0.4 %
EOSINOPHILS ABSOLUTE: 0 THOU/MM3 (ref 0–0.4)
EPITHELIAL CELLS, UA: ABNORMAL /HPF
ERYTHROCYTE [DISTWIDTH] IN BLOOD BY AUTOMATED COUNT: 17.1 % (ref 11.5–14.5)
GFR SERPL CREATININE-BSD FRML MDRD: 55 ML/MIN/1.73M2
GFR SERPL CREATININE-BSD FRML MDRD: 63 ML/MIN/1.73M2
GLUCOSE SERPL-MCNC: 115 MG/DL (ref 74–109)
GLUCOSE SERPL-MCNC: 176 MG/DL (ref 74–109)
GLUCOSE UR QL STRIP.AUTO: NEGATIVE MG/DL
HCT VFR BLD AUTO: 22.7 % (ref 37–47)
HGB BLD-MCNC: 7.5 GM/DL (ref 12–16)
HGB UR QL STRIP.AUTO: NEGATIVE
IAT IGG-SP REAG SERPL QL: NORMAL
IMM GRANULOCYTES # BLD AUTO: 0.04 THOU/MM3 (ref 0–0.07)
IMM GRANULOCYTES NFR BLD AUTO: 0.4 %
KETONES UR QL STRIP.AUTO: NEGATIVE
LYMPHOCYTES ABSOLUTE: 0.9 THOU/MM3 (ref 1–4.8)
LYMPHOCYTES NFR BLD AUTO: 10.3 %
MAGNESIUM SERPL-MCNC: 1.6 MG/DL (ref 1.6–2.6)
MAGNESIUM SERPL-MCNC: 2 MG/DL (ref 1.6–2.6)
MCH RBC QN AUTO: 31 PG (ref 26–33)
MCHC RBC AUTO-ENTMCNC: 33 GM/DL (ref 32.2–35.5)
MCV RBC AUTO: 93.8 FL (ref 81–99)
MISCELLANEOUS 2: ABNORMAL
MONOCYTES ABSOLUTE: 0.8 THOU/MM3 (ref 0.4–1.3)
MONOCYTES NFR BLD AUTO: 8.8 %
NEUTROPHILS ABSOLUTE: 7.3 THOU/MM3 (ref 1.8–7.7)
NEUTROPHILS NFR BLD AUTO: 79.8 %
NITRITE UR QL STRIP: NEGATIVE
NRBC BLD AUTO-RTO: 0 /100 WBC
PH UR STRIP.AUTO: 5 [PH] (ref 5–9)
PLATELET # BLD AUTO: 175 THOU/MM3 (ref 130–400)
PMV BLD AUTO: 9.5 FL (ref 9.4–12.4)
POTASSIUM SERPL-SCNC: 3.7 MEQ/L (ref 3.5–5.2)
POTASSIUM SERPL-SCNC: 4.2 MEQ/L (ref 3.5–5.2)
PROT UR STRIP.AUTO-MCNC: 30 MG/DL
RBC # BLD AUTO: 2.42 MILL/MM3 (ref 4.2–5.4)
RBC URINE: ABNORMAL /HPF
RENAL EPI CELLS #/AREA URNS HPF: ABNORMAL /[HPF]
RH BLD: NORMAL
SODIUM SERPL-SCNC: 136 MEQ/L (ref 135–145)
SODIUM SERPL-SCNC: 137 MEQ/L (ref 135–145)
SP GR UR REFRACT.AUTO: 1.01 (ref 1–1.03)
UROBILINOGEN, URINE: 0.2 EU/DL (ref 0–1)
WBC # BLD AUTO: 9.2 THOU/MM3 (ref 4.8–10.8)
WBC #/AREA URNS HPF: ABNORMAL /HPF
WBC #/AREA URNS HPF: NEGATIVE /[HPF]
YEAST LIKE FUNGI URNS QL MICRO: ABNORMAL

## 2025-06-13 PROCEDURE — 81001 URINALYSIS AUTO W/SCOPE: CPT

## 2025-06-13 PROCEDURE — 85025 COMPLETE CBC W/AUTO DIFF WBC: CPT

## 2025-06-13 PROCEDURE — 93307 TTE W/O DOPPLER COMPLETE: CPT

## 2025-06-13 PROCEDURE — 93010 ELECTROCARDIOGRAM REPORT: CPT | Performed by: INTERNAL MEDICINE

## 2025-06-13 PROCEDURE — 36430 TRANSFUSION BLD/BLD COMPNT: CPT

## 2025-06-13 PROCEDURE — 86901 BLOOD TYPING SEROLOGIC RH(D): CPT

## 2025-06-13 PROCEDURE — 30233N1 TRANSFUSION OF NONAUTOLOGOUS RED BLOOD CELLS INTO PERIPHERAL VEIN, PERCUTANEOUS APPROACH: ICD-10-PCS | Performed by: INTERNAL MEDICINE

## 2025-06-13 PROCEDURE — 80048 BASIC METABOLIC PNL TOTAL CA: CPT

## 2025-06-13 PROCEDURE — 94640 AIRWAY INHALATION TREATMENT: CPT

## 2025-06-13 PROCEDURE — 2500000003 HC RX 250 WO HCPCS

## 2025-06-13 PROCEDURE — 83735 ASSAY OF MAGNESIUM: CPT

## 2025-06-13 PROCEDURE — 93005 ELECTROCARDIOGRAM TRACING: CPT | Performed by: INTERNAL MEDICINE

## 2025-06-13 PROCEDURE — 36415 COLL VENOUS BLD VENIPUNCTURE: CPT

## 2025-06-13 PROCEDURE — 6370000000 HC RX 637 (ALT 250 FOR IP): Performed by: NURSE PRACTITIONER

## 2025-06-13 PROCEDURE — 6360000002 HC RX W HCPCS: Performed by: NURSE PRACTITIONER

## 2025-06-13 PROCEDURE — 2100000000 HC CCU R&B

## 2025-06-13 PROCEDURE — 86923 COMPATIBILITY TEST ELECTRIC: CPT

## 2025-06-13 PROCEDURE — 6360000002 HC RX W HCPCS: Performed by: INTERNAL MEDICINE

## 2025-06-13 PROCEDURE — 2500000003 HC RX 250 WO HCPCS: Performed by: INTERNAL MEDICINE

## 2025-06-13 PROCEDURE — 6360000002 HC RX W HCPCS

## 2025-06-13 PROCEDURE — 99232 SBSQ HOSP IP/OBS MODERATE 35: CPT | Performed by: INTERNAL MEDICINE

## 2025-06-13 PROCEDURE — 86885 COOMBS TEST INDIRECT QUAL: CPT

## 2025-06-13 PROCEDURE — 2500000003 HC RX 250 WO HCPCS: Performed by: NURSE PRACTITIONER

## 2025-06-13 PROCEDURE — 86900 BLOOD TYPING SEROLOGIC ABO: CPT

## 2025-06-13 PROCEDURE — 6370000000 HC RX 637 (ALT 250 FOR IP): Performed by: INTERNAL MEDICINE

## 2025-06-13 PROCEDURE — 99222 1ST HOSP IP/OBS MODERATE 55: CPT | Performed by: NURSE PRACTITIONER

## 2025-06-13 PROCEDURE — P9016 RBC LEUKOCYTES REDUCED: HCPCS

## 2025-06-13 RX ORDER — POTASSIUM CHLORIDE 1500 MG/1
40 TABLET, EXTENDED RELEASE ORAL ONCE
Status: COMPLETED | OUTPATIENT
Start: 2025-06-13 | End: 2025-06-13

## 2025-06-13 RX ORDER — LEVALBUTEROL INHALATION SOLUTION 1.25 MG/3ML
1.25 SOLUTION RESPIRATORY (INHALATION) ONCE
Status: COMPLETED | OUTPATIENT
Start: 2025-06-13 | End: 2025-06-13

## 2025-06-13 RX ORDER — FUROSEMIDE 20 MG/1
20 TABLET ORAL DAILY
Status: DISCONTINUED | OUTPATIENT
Start: 2025-06-13 | End: 2025-06-16

## 2025-06-13 RX ORDER — LANOLIN ALCOHOL/MO/W.PET/CERES
400 CREAM (GRAM) TOPICAL ONCE
Status: COMPLETED | OUTPATIENT
Start: 2025-06-13 | End: 2025-06-13

## 2025-06-13 RX ORDER — FUROSEMIDE 10 MG/ML
20 INJECTION INTRAMUSCULAR; INTRAVENOUS ONCE
Status: COMPLETED | OUTPATIENT
Start: 2025-06-13 | End: 2025-06-13

## 2025-06-13 RX ORDER — METOPROLOL SUCCINATE 25 MG/1
25 TABLET, EXTENDED RELEASE ORAL DAILY
Status: DISCONTINUED | OUTPATIENT
Start: 2025-06-13 | End: 2025-06-13

## 2025-06-13 RX ORDER — IBUPROFEN 200 MG
400 TABLET ORAL EVERY 6 HOURS PRN
Status: DISCONTINUED | OUTPATIENT
Start: 2025-06-13 | End: 2025-06-18 | Stop reason: HOSPADM

## 2025-06-13 RX ORDER — METOPROLOL SUCCINATE 50 MG/1
50 TABLET, EXTENDED RELEASE ORAL DAILY
Status: DISCONTINUED | OUTPATIENT
Start: 2025-06-13 | End: 2025-06-13

## 2025-06-13 RX ORDER — METOPROLOL SUCCINATE 25 MG/1
25 TABLET, EXTENDED RELEASE ORAL 2 TIMES DAILY
Status: DISCONTINUED | OUTPATIENT
Start: 2025-06-14 | End: 2025-06-14

## 2025-06-13 RX ORDER — LISINOPRIL 20 MG/1
20 TABLET ORAL DAILY
Status: DISCONTINUED | OUTPATIENT
Start: 2025-06-13 | End: 2025-06-17

## 2025-06-13 RX ORDER — AMLODIPINE BESYLATE 2.5 MG/1
2.5 TABLET ORAL DAILY
Status: DISCONTINUED | OUTPATIENT
Start: 2025-06-14 | End: 2025-06-13

## 2025-06-13 RX ORDER — SODIUM CHLORIDE 9 MG/ML
INJECTION, SOLUTION INTRAVENOUS PRN
Status: DISCONTINUED | OUTPATIENT
Start: 2025-06-13 | End: 2025-06-18 | Stop reason: HOSPADM

## 2025-06-13 RX ORDER — AMLODIPINE BESYLATE 5 MG/1
2.5 TABLET ORAL DAILY
Status: DISCONTINUED | OUTPATIENT
Start: 2025-06-13 | End: 2025-06-17

## 2025-06-13 RX ADMIN — MORPHINE SULFATE 2 MG: 2 INJECTION, SOLUTION INTRAMUSCULAR; INTRAVENOUS at 20:07

## 2025-06-13 RX ADMIN — ATORVASTATIN CALCIUM 80 MG: 80 TABLET, FILM COATED ORAL at 18:02

## 2025-06-13 RX ADMIN — Medication 400 UNITS: at 09:20

## 2025-06-13 RX ADMIN — FERROUS SULFATE TAB 325 MG (65 MG ELEMENTAL FE) 325 MG: 325 (65 FE) TAB at 09:19

## 2025-06-13 RX ADMIN — POTASSIUM CHLORIDE 40 MEQ: 1500 TABLET, EXTENDED RELEASE ORAL at 22:08

## 2025-06-13 RX ADMIN — LISINOPRIL 20 MG: 20 TABLET ORAL at 09:05

## 2025-06-13 RX ADMIN — CLOPIDOGREL BISULFATE 75 MG: 75 TABLET, FILM COATED ORAL at 09:05

## 2025-06-13 RX ADMIN — ASPIRIN 81 MG: 81 TABLET, CHEWABLE ORAL at 09:05

## 2025-06-13 RX ADMIN — FUROSEMIDE 20 MG: 20 TABLET ORAL at 09:07

## 2025-06-13 RX ADMIN — AMIODARONE HYDROCHLORIDE 1 MG/MIN: 1.8 INJECTION, SOLUTION INTRAVENOUS at 18:26

## 2025-06-13 RX ADMIN — ONDANSETRON 4 MG: 2 INJECTION, SOLUTION INTRAMUSCULAR; INTRAVENOUS at 18:24

## 2025-06-13 RX ADMIN — LEVALBUTEROL HYDROCHLORIDE 1.25 MG: 1.25 SOLUTION RESPIRATORY (INHALATION) at 17:25

## 2025-06-13 RX ADMIN — SODIUM CHLORIDE, PRESERVATIVE FREE 10 ML: 5 INJECTION INTRAVENOUS at 09:25

## 2025-06-13 RX ADMIN — MORPHINE SULFATE 2 MG: 2 INJECTION, SOLUTION INTRAMUSCULAR; INTRAVENOUS at 18:10

## 2025-06-13 RX ADMIN — ACETAMINOPHEN 650 MG: 325 TABLET ORAL at 18:02

## 2025-06-13 RX ADMIN — SODIUM CHLORIDE, PRESERVATIVE FREE 10 ML: 5 INJECTION INTRAVENOUS at 20:07

## 2025-06-13 RX ADMIN — METOPROLOL SUCCINATE 25 MG: 50 TABLET, EXTENDED RELEASE ORAL at 20:06

## 2025-06-13 RX ADMIN — COLCHICINE 0.6 MG: 0.6 TABLET, FILM COATED ORAL at 09:18

## 2025-06-13 RX ADMIN — AMIODARONE HYDROCHLORIDE 150 MG: 1.5 INJECTION, SOLUTION INTRAVENOUS at 18:13

## 2025-06-13 RX ADMIN — Medication 400 MG: at 22:07

## 2025-06-13 RX ADMIN — FUROSEMIDE 20 MG: 10 INJECTION, SOLUTION INTRAMUSCULAR; INTRAVENOUS at 18:10

## 2025-06-13 RX ADMIN — ACETAMINOPHEN 650 MG: 325 TABLET ORAL at 09:18

## 2025-06-13 RX ADMIN — PANTOPRAZOLE SODIUM 40 MG: 40 TABLET, DELAYED RELEASE ORAL at 06:37

## 2025-06-13 ASSESSMENT — PAIN DESCRIPTION - DESCRIPTORS
DESCRIPTORS: ACHING
DESCRIPTORS: ACHING

## 2025-06-13 ASSESSMENT — PAIN DESCRIPTION - LOCATION
LOCATION: BACK
LOCATION: BACK

## 2025-06-13 ASSESSMENT — PAIN DESCRIPTION - ORIENTATION
ORIENTATION: MID
ORIENTATION: PROXIMAL

## 2025-06-13 ASSESSMENT — PAIN SCALES - GENERAL
PAINLEVEL_OUTOF10: 2
PAINLEVEL_OUTOF10: 2
PAINLEVEL_OUTOF10: 0

## 2025-06-13 NOTE — PROGRESS NOTES
Patient:  Marlene Ray    Unit/Bed:3A-01/001-A  MRN: [id number]   PCP: Emanuel Michael,   Date of Admission: 6/10/2025    Assessment and Plan (All pulmonary edema, renal failure, PE, and respiratory failure diagnoses are acute in nature unless otherwise specified)     Pericardial effusion with cardiac tamponade: Hypotensive and unresponsive after removal of TVP, rapid response was called.  Stat echo showed pericardial effusion causing right ventricular pain.  Urgently taken to the Cath Lab.  S/p pericardiocentesis with Dr. Rodriguez 200 cc fluid removed.  Monitor drain output  Drain to gravity, drain every 8 hours with 50 cc syringe until no further fluid withdrawn  Must be draining less than 50 cc of total output in 24 hours for 2 consecutive days prior to removal  Pericardial drain on/off: -15 cc blood between 1001-2344, -200 cc between 3055-4891 on 6/11.  Overnight, aspirated with 50 cc syringe revealing 15 cc blood-tinged fluid and left to drain.  Flushed with 22 cc sterile NS to ensure patency of tube.  Morphine 2 mg every hour as needed for pain control  TTE 6/12 shows EF 60-65%, appropriately positioned transcatheter bioprosthetic valve that is well-seated, LA mildly dilated, RA mildly dilated, small less than 1 cm pericardial effusion present with no indication of cardiac tamponade.  Call cardiology with any concern of hemodynamic instability  Severe symptomatic aortic stenosis s/p TAVR: Patient underwent TAVR on 6/10/2025.    CAD, ICM: S/p PCI to LCx on 5/12/2025.  S/p PCI to RCA on 5/30/2025.  Home medications include aspirin and Plavix.  Currently held for 24 hours post procedure.  Paroxysmal atrial fibrillation: [id number]: 6. Was taking Warfarin previously, stopped taking 2/2025 due to GIB. Referred for Watchman.  Leukocytosis: WBC downtrending, 9.2.  Afebrile.  Continue to monitor CBC.  Monitor for signs/symptoms of infection.  Urine culture pending.  Hypertension: Home medications include

## 2025-06-13 NOTE — PROGRESS NOTES
Comprehensive Nutrition Assessment    Type and Reason for Visit:  Initial, Consult (cardiac diet education)    Nutrition Recommendations/Plan:   Recommend to continue bowel regimen  Ordered ONS: Ensure Plus TID  Attempted to initiate cardiac diet education. Pt declined education - feels comfortable with diet at this time.  Monitor appetite and encourage PO at best efforts     Malnutrition Assessment:  Malnutrition Status:  At risk for malnutrition (06/13/25 1322)    Context:  Acute Illness     Findings of the 6 clinical characteristics of malnutrition:  Energy Intake:  Mild decrease in energy intake (pt states that she was eating well PTA but does not like the food here)  Weight Loss:  No weight loss     Body Fat Loss:  No body fat loss     Muscle Mass Loss:  No muscle mass loss    Fluid Accumulation:  No fluid accumulation     Strength:  Not Performed    Nutrition Assessment:    Pt. nutritionally compromised AEB s/p hypotensive after removal of TVP and rapid resonse called on 6/10. At risk for further nutrition compromise r/t s/p PCI to LCx on 5/12, s/p PCI to RCA on 5/30, s/p TAVR 6/10 and underlying medical condition (hx: pericardial effusion, heart valve replacement, cardiac tamponade, angioplasty w/ stent, CAD).      Nutrition Related Findings:    Pt. Report/Treatments/Miscellaneous: Pt seen - reports a decreased appetite lately and not liking the entrees offered in the hospital. Reports mainly eating fruit and milk this admission. Reports a good appetite PTA and living an active lifestyle (I.e. mowing her own lawn). Reported constipation currently. Pt reported regular bowel movements PTA. Pt declined cardiac diet education as she said that she has studied nutrition on her own.  GI Status: small BM this am per pt  Pertinent Labs: sodium 136, potassium 4.2, BUN 22, Cr 1.0, eGFR 55, glucose 115  Pertinent Meds: vitamin C, statin, iron, lasix, vitamin E, PRN morphine, zofran, glycolax (given 6/12)  Wound Type:

## 2025-06-13 NOTE — PROGRESS NOTES
Pt with low urine output so far this shift. Pt has voided 175 ml total. Bladder scan done and showed 91 ml. Also notified that pt is getting dyspneic on exertion and audible wheezing noted. Notified PRIYANKA Covarrubias. Echo just finished at bedside so per CNP will await echo results. Will continue to monitor urine output and encourage IVF's. Breathing treatment ordered.     @1700 CVP transduced per PRIYANKA Valladares Hemzeb. CVP was 6. Notified PRIYANKA Valladares Hempralph. Notified PRIYANKA Valladares of -160's. New orders received to restart BP meds.     @1730 New order for 1 unit of PRBC and to give 20 IV Lasix afterwards. PRIYANKA Valladares at bedside to remove pericardial drain.     @1755 PRIYANKA Valladares Hempralph at bedside, drain removed. Pt now in afib RVR and c/o pain and nausea. New orders received. Metoprolol and amlodipine held per PRIYANKA Valladares. New orders placed.

## 2025-06-13 NOTE — PROGRESS NOTES
Structural Heart/Cardiology Progress Note    2025 8:07 AM    Procedure:  TAVR        POD #:  3    Subjective:        Resting in bed with daughter at bedside  No distress, bright, pleasant  Denies chest discomfort - some upper mid back soreness  Breathing stable - denies dyspnea at rest  No pain at pericardial drain site; no increase in drainage since 15 cc evacuated last alana  Pericardial drain remains open  Hgb 7.5 - nursing reports some WHITNEY when up  Mucous in throat; upper airway - lungs clear - xopenx Rx  SR with HR 90's to day (off Toprol per TAVR protocol)  Up in room and chair - tolerating well  Plavix and ASA restarted this AM  Echo this afternoon - stable - no pericardial fluid per Dr. Michael ingram      SR, VSS    Remains in ICU  Stable OVN with brief lower BP - Albumin  SR  Pericardial drain continues; 15 cc OVN  Drain clamped this AM with q8h aspiration attempts   CRP and ESR pending  Afebrile   WBC 13.6 (7.9, 6.9, 5.5)  Hgb 7.9 (8.2, 9.8, 10, 10.7)  K 3.9  Renal status stable  No chest discomfort today  CRP 8.95; ESR 38      Resting in bed w/ no concerns  Eating, drinking urinating  Has not been up walking yet  Denies pain     Vital Signs: BP (!) 141/47   Pulse 100   Temp 98.1 °F (36.7 °C) (Oral)   Resp (!) 36   Ht 1.626 m (5' 4\")   Wt 64.1 kg (141 lb 5 oz)   SpO2 94%   BMI 24.26 kg/m²      Temp (24hrs), Av.3 °F (36.8 °C), Min:98.1 °F (36.7 °C), Max:98.8 °F (37.1 °C)      Weight - Scale: 64.1 kg (141 lb 5 oz)       PULSE OXIMETRY RANGE: SpO2  Av.3 %  Min: 87 %  Max: 98 %  SUPPLEMENTAL O2: O2 Flow Rate (L/min): 2 L/min     Labs:   CBC:     Recent Labs     25  1329 25  0134 25  0700 25  0735   WBC 7.9 13.6*  --  9.2   HGB 9.8* 8.2* 7.9* 7.5*   HCT 31.0* 25.3* 24.6* 22.7*   MCV 96.6 94.4  --  93.8    206  --  175     BMP:  Recent Labs     25  0548 25  1329 25  0134    136 135   K 4.1 4.1 3.9    104 103   CO2 24 22 24

## 2025-06-13 NOTE — PROGRESS NOTES
Complex Cardiology Brief Note:    Notified by nursing staff that patient had been hypertensive to 160/ prior to leaving ICU for TRO to 3A. She was given Hydralzine IV.  Reported patient complained of back pain on arrival to 3A ` 2 hours later and BP now hypotensive. She was given Tylenol for the back pain and it resolved. Her BP continued to run in the 90's/.  Writer to bedside. Patient up in chair resting. No acute distress. Awakens easily. Reports feels fine now. Back pain more in lower neck - usual for her if has head bent forward - had been sleeping with head bent forward. Denies back or neck pain now.   Respirations easy; moist, rattling cough - few fine crackles bases bilaterally. Nurse reported patient WHITNEY when up to BR. Daughter confirms patient not WHITNEY when up in room or lewis in ICU earlier today.   Pericardial drain remains clamped. Heart tones strong and regular. Grade II/VI murmur LSB. No JVD.   Pericardial drain aspirated with 50 cc syringe. Slow aspiration of thin, blood-tinged fluid in amount of 15 ml.   Pericardial drain left open to drainage bag (tubing to bag flushed with 22 ml sterile NS) to assure tube patency.   Patient tolerated well. States she feels she is breathing easier. VSS. She also reports she has not drank a great deal today.   CXR obtained - mild pulmonary vascular congestion noted upon my review. Patient feels warm to touch; T 99.5 Ax.   Dr. Rodriguez updated. Plan of care discussed. Orders placed.   Patient remains stable. Plan discussed with nursing staff and patient and daughter updated.    Further recommendations based on results and clinical course. If any issues, please contact the Complex Cardiology/Structural Heart Service via Structural Heart on [employer].  (Jacquelyn Covarrubias, XOCHILT - CNP on call).    Jacquelyn Covarrubias, XOCHILT - CNP

## 2025-06-13 NOTE — CONSENT
Informed Consent for Blood Component Transfusion Note    I have discussed with the patient the rationale for blood component transfusion; its benefits in treating or preventing fatigue, organ damage, or death; and its risk which includes mild transfusion reactions, rare risk of blood borne infection, or more serious but rare reactions. I have discussed the alternatives to transfusion, including the risk and consequences of not receiving transfusion. The patient and daughter had an opportunity to ask questions and had agreed to proceed with transfusion of blood components.    Electronically signed by XOCHILT Owen CNP on 6/13/25 at 5:34 PM EDT

## 2025-06-13 NOTE — PROGRESS NOTES
Spiritual Health History and Assessment/Progress Note  [facility]    Spiritual/Emotional Needs,  ,  ,      Name: Marlene Ray MRN: [id number]    Age: 85 y.o.     Sex: female   Language: English   Faith: None   Severe aortic stenosis     Date: 6/13/2025            Total Time Calculated: 5 min              Spiritual Assessment continued in [facility] CCU 3A        Referral/Consult From: Rounding   Encounter Overview/Reason: Spiritual/Emotional Needs  Service Provided For: Patient    Melany, Belief, Meaning:   Patient unable to assess at this time  Family/Friends No family/friends present      Importance and Influence:  Patient unable to assess at this time  Family/Friends No family/friends present    Community:  Patient feels well-supported. Support system includes: Children  Family/Friends feel well-supported. Support system includes: Children    Assessment and Plan of Care:   In my encounter with the 85 yr old patient, while rounding  the unit 3A,  I provided spiritual care to patient through conversation, I also came to assess the patient's spiritual needs present. The pt was admitted due to severe aortic stenosis.     Patient Interventions include: Facilitated expression of thoughts and feelings  Family/Friends Interventions include: Facilitated expression of thoughts and feelings    Patient Plan of Care: Spiritual Care available upon further referral  Family/Friends Plan of Care: Spiritual Care available upon further referral    Electronically signed by BRITTANY Spear on 6/13/2025 at 9:51 AM

## 2025-06-13 NOTE — PROCEDURES
PROCEDURE NOTE  Date: 6/13/2025   Name: Marlene Ray  YOB: 1940    Procedures    EKG Completed. Handed to RN.

## 2025-06-13 NOTE — PLAN OF CARE
Problem: Discharge Planning  Goal: Discharge to home or other facility with appropriate resources  Outcome: Progressing  Flowsheets (Taken 6/12/2025 2000)  Discharge to home or other facility with appropriate resources: Identify barriers to discharge with patient and caregiver     Problem: Pain  Goal: Verbalizes/displays adequate comfort level or baseline comfort level  Outcome: Progressing  Note: Continuing to monitor pain and discomfort.  Monitoring pain level on scale of 0-10. Non- pharmacological measures encouraged to reduce discomfort/pain.  PRN pain meds administeration continues when/if applicable as ordered by physician.        Problem: Safety - Adult  Goal: Free from fall injury  Outcome: Progressing  Note: Falling star program remains in place. Call light and personal belongings within reach. Frequent visual monitoring continues.  Toileting program inplace. Patient assisted in turning/repositioning at least once every 2 hours, and on a prn basis.       Problem: ABCDS Injury Assessment  Goal: Absence of physical injury  Outcome: Progressing     Problem: Cardiovascular - Adult  Goal: Maintains optimal cardiac output and hemodynamic stability  Outcome: Progressing  Goal: Absence of cardiac dysrhythmias or at baseline  Outcome: Progressing     Problem: Chronic Conditions and Co-morbidities  Goal: Patient's chronic conditions and co-morbidity symptoms are monitored and maintained or improved  Outcome: Progressing     Problem: Skin/Tissue Integrity  Goal: Skin integrity remains intact  Description: 1.  Monitor for areas of redness and/or skin breakdown2.  Assess vascular access sites hourly3.  Every 4-6 hours minimum:  Change oxygen saturation probe site4.  Every 4-6 hours:  If on nasal continuous positive airway pressure, respiratory therapy assess nares and determine need for appliance change or resting period  Outcome: Progressing  Note: Monitoring patient skin integrity for skin breakdown, turning and

## 2025-06-14 ENCOUNTER — APPOINTMENT (OUTPATIENT)
Age: 85
DRG: 267 | End: 2025-06-14
Attending: NURSE PRACTITIONER
Payer: MEDICARE

## 2025-06-14 ENCOUNTER — APPOINTMENT (OUTPATIENT)
Dept: GENERAL RADIOLOGY | Age: 85
DRG: 267 | End: 2025-06-14
Attending: INTERNAL MEDICINE
Payer: MEDICARE

## 2025-06-14 LAB
ALBUMIN SERPL BCG-MCNC: 3.8 G/DL (ref 3.4–4.9)
ALP SERPL-CCNC: 94 U/L (ref 38–126)
ALT SERPL W/O P-5'-P-CCNC: 42 U/L (ref 10–35)
ANION GAP SERPL CALC-SCNC: 13 MEQ/L (ref 8–16)
ANION GAP SERPL CALC-SCNC: 14 MEQ/L (ref 8–16)
AST SERPL-CCNC: 59 U/L (ref 10–35)
BASOPHILS ABSOLUTE: 0 THOU/MM3 (ref 0–0.1)
BASOPHILS NFR BLD AUTO: 0.3 %
BILIRUB SERPL-MCNC: 1 MG/DL (ref 0.3–1.2)
BUN SERPL-MCNC: 24 MG/DL (ref 8–23)
BUN SERPL-MCNC: 25 MG/DL (ref 8–23)
CA-I BLD ISE-SCNC: 1.18 MMOL/L (ref 1.12–1.32)
CALCIUM SERPL-MCNC: 8.6 MG/DL (ref 8.8–10.2)
CALCIUM SERPL-MCNC: 8.7 MG/DL (ref 8.8–10.2)
CHLORIDE SERPL-SCNC: 100 MEQ/L (ref 98–111)
CHLORIDE SERPL-SCNC: 101 MEQ/L (ref 98–111)
CO2 SERPL-SCNC: 21 MEQ/L (ref 22–29)
CO2 SERPL-SCNC: 22 MEQ/L (ref 22–29)
CREAT SERPL-MCNC: 0.9 MG/DL (ref 0.5–0.9)
CREAT SERPL-MCNC: 0.9 MG/DL (ref 0.5–0.9)
DEPRECATED RDW RBC AUTO: 57.1 FL (ref 35–45)
DEPRECATED RDW RBC AUTO: 58 FL (ref 35–45)
EKG ATRIAL RATE: 70 BPM
EKG P AXIS: 2 DEGREES
EKG P-R INTERVAL: 220 MS
EKG Q-T INTERVAL: 424 MS
EKG Q-T INTERVAL: 438 MS
EKG Q-T INTERVAL: 438 MS
EKG QRS DURATION: 124 MS
EKG QRS DURATION: 124 MS
EKG QRS DURATION: 132 MS
EKG QTC CALCULATION (BAZETT): 457 MS
EKG QTC CALCULATION (BAZETT): 465 MS
EKG QTC CALCULATION (BAZETT): 465 MS
EKG R AXIS: -16 DEGREES
EKG T AXIS: 86 DEGREES
EKG T AXIS: 93 DEGREES
EKG T AXIS: 93 DEGREES
EKG VENTRICULAR RATE: 68 BPM
EKG VENTRICULAR RATE: 68 BPM
EKG VENTRICULAR RATE: 70 BPM
EOSINOPHIL NFR BLD AUTO: 0.5 %
EOSINOPHILS ABSOLUTE: 0.1 THOU/MM3 (ref 0–0.4)
ERYTHROCYTE [DISTWIDTH] IN BLOOD BY AUTOMATED COUNT: 16.6 % (ref 11.5–14.5)
ERYTHROCYTE [DISTWIDTH] IN BLOOD BY AUTOMATED COUNT: 16.9 % (ref 11.5–14.5)
GFR SERPL CREATININE-BSD FRML MDRD: 63 ML/MIN/1.73M2
GFR SERPL CREATININE-BSD FRML MDRD: 63 ML/MIN/1.73M2
GLUCOSE SERPL-MCNC: 140 MG/DL (ref 74–109)
GLUCOSE SERPL-MCNC: 162 MG/DL (ref 74–109)
HCT VFR BLD AUTO: 26.2 % (ref 37–47)
HCT VFR BLD AUTO: 28.1 % (ref 37–47)
HGB BLD-MCNC: 8.5 GM/DL (ref 12–16)
HGB BLD-MCNC: 9.1 GM/DL (ref 12–16)
IMM GRANULOCYTES # BLD AUTO: 0.05 THOU/MM3 (ref 0–0.07)
IMM GRANULOCYTES NFR BLD AUTO: 0.5 %
LYMPHOCYTES ABSOLUTE: 2 THOU/MM3 (ref 1–4.8)
LYMPHOCYTES NFR BLD AUTO: 18.6 %
MAGNESIUM SERPL-MCNC: 1.8 MG/DL (ref 1.6–2.6)
MCH RBC QN AUTO: 30.5 PG (ref 26–33)
MCH RBC QN AUTO: 30.5 PG (ref 26–33)
MCHC RBC AUTO-ENTMCNC: 32.4 GM/DL (ref 32.2–35.5)
MCHC RBC AUTO-ENTMCNC: 32.4 GM/DL (ref 32.2–35.5)
MCV RBC AUTO: 93.9 FL (ref 81–99)
MCV RBC AUTO: 94.3 FL (ref 81–99)
MONOCYTES ABSOLUTE: 1.1 THOU/MM3 (ref 0.4–1.3)
MONOCYTES NFR BLD AUTO: 9.9 %
NEUTROPHILS ABSOLUTE: 7.5 THOU/MM3 (ref 1.8–7.7)
NEUTROPHILS NFR BLD AUTO: 70.2 %
NRBC BLD AUTO-RTO: 0 /100 WBC
NT-PROBNP SERPL IA-MCNC: 3697 PG/ML (ref 0–449)
PHOSPHATE SERPL-MCNC: 2.8 MG/DL (ref 2.5–4.5)
PLATELET # BLD AUTO: 193 THOU/MM3 (ref 130–400)
PLATELET # BLD AUTO: 216 THOU/MM3 (ref 130–400)
PMV BLD AUTO: 9.5 FL (ref 9.4–12.4)
PMV BLD AUTO: 9.6 FL (ref 9.4–12.4)
POTASSIUM SERPL-SCNC: 4 MEQ/L (ref 3.5–5.2)
POTASSIUM SERPL-SCNC: 4.6 MEQ/L (ref 3.5–5.2)
PROT SERPL-MCNC: 6.6 G/DL (ref 6.4–8.3)
RBC # BLD AUTO: 2.79 MILL/MM3 (ref 4.2–5.4)
RBC # BLD AUTO: 2.98 MILL/MM3 (ref 4.2–5.4)
SODIUM SERPL-SCNC: 135 MEQ/L (ref 135–145)
SODIUM SERPL-SCNC: 136 MEQ/L (ref 135–145)
WBC # BLD AUTO: 10.7 THOU/MM3 (ref 4.8–10.8)
WBC # BLD AUTO: 7.9 THOU/MM3 (ref 4.8–10.8)

## 2025-06-14 PROCEDURE — 36415 COLL VENOUS BLD VENIPUNCTURE: CPT

## 2025-06-14 PROCEDURE — 80053 COMPREHEN METABOLIC PANEL: CPT

## 2025-06-14 PROCEDURE — 84100 ASSAY OF PHOSPHORUS: CPT

## 2025-06-14 PROCEDURE — 2140000000 HC CCU INTERMEDIATE R&B

## 2025-06-14 PROCEDURE — 6370000000 HC RX 637 (ALT 250 FOR IP): Performed by: INTERNAL MEDICINE

## 2025-06-14 PROCEDURE — 99232 SBSQ HOSP IP/OBS MODERATE 35: CPT | Performed by: INTERNAL MEDICINE

## 2025-06-14 PROCEDURE — 2500000003 HC RX 250 WO HCPCS: Performed by: INTERNAL MEDICINE

## 2025-06-14 PROCEDURE — 71045 X-RAY EXAM CHEST 1 VIEW: CPT

## 2025-06-14 PROCEDURE — 93306 TTE W/DOPPLER COMPLETE: CPT

## 2025-06-14 PROCEDURE — 85027 COMPLETE CBC AUTOMATED: CPT

## 2025-06-14 PROCEDURE — 82330 ASSAY OF CALCIUM: CPT

## 2025-06-14 PROCEDURE — 83735 ASSAY OF MAGNESIUM: CPT

## 2025-06-14 PROCEDURE — 83880 ASSAY OF NATRIURETIC PEPTIDE: CPT

## 2025-06-14 PROCEDURE — 93010 ELECTROCARDIOGRAM REPORT: CPT | Performed by: INTERNAL MEDICINE

## 2025-06-14 PROCEDURE — 99221 1ST HOSP IP/OBS SF/LOW 40: CPT | Performed by: NURSE PRACTITIONER

## 2025-06-14 PROCEDURE — 6370000000 HC RX 637 (ALT 250 FOR IP): Performed by: NURSE PRACTITIONER

## 2025-06-14 PROCEDURE — 93005 ELECTROCARDIOGRAM TRACING: CPT

## 2025-06-14 PROCEDURE — 94761 N-INVAS EAR/PLS OXIMETRY MLT: CPT

## 2025-06-14 PROCEDURE — 93005 ELECTROCARDIOGRAM TRACING: CPT | Performed by: NURSE PRACTITIONER

## 2025-06-14 PROCEDURE — 85025 COMPLETE CBC W/AUTO DIFF WBC: CPT

## 2025-06-14 RX ORDER — FUROSEMIDE 20 MG/1
20 TABLET ORAL ONCE
Status: COMPLETED | OUTPATIENT
Start: 2025-06-14 | End: 2025-06-14

## 2025-06-14 RX ORDER — ATROPINE SULFATE 0.1 MG/ML
0.5 INJECTION INTRAVENOUS AS NEEDED
Status: DISCONTINUED | OUTPATIENT
Start: 2025-06-14 | End: 2025-06-18 | Stop reason: HOSPADM

## 2025-06-14 RX ORDER — GLUCAGON 1 MG/ML
KIT INJECTION
Status: DISCONTINUED
Start: 2025-06-14 | End: 2025-06-14

## 2025-06-14 RX ADMIN — COLCHICINE 0.6 MG: 0.6 TABLET, FILM COATED ORAL at 09:10

## 2025-06-14 RX ADMIN — FERROUS SULFATE TAB 325 MG (65 MG ELEMENTAL FE) 325 MG: 325 (65 FE) TAB at 09:10

## 2025-06-14 RX ADMIN — OXYCODONE HYDROCHLORIDE AND ACETAMINOPHEN 1000 MG: 500 TABLET ORAL at 09:09

## 2025-06-14 RX ADMIN — FUROSEMIDE 20 MG: 20 TABLET ORAL at 09:10

## 2025-06-14 RX ADMIN — ATORVASTATIN CALCIUM 80 MG: 80 TABLET, FILM COATED ORAL at 17:46

## 2025-06-14 RX ADMIN — CLOPIDOGREL BISULFATE 75 MG: 75 TABLET, FILM COATED ORAL at 09:10

## 2025-06-14 RX ADMIN — FUROSEMIDE 20 MG: 20 TABLET ORAL at 16:16

## 2025-06-14 RX ADMIN — APIXABAN 5 MG: 5 TABLET, FILM COATED ORAL at 11:15

## 2025-06-14 RX ADMIN — AMLODIPINE BESYLATE 2.5 MG: 5 TABLET ORAL at 09:09

## 2025-06-14 RX ADMIN — APIXABAN 5 MG: 5 TABLET, FILM COATED ORAL at 19:52

## 2025-06-14 RX ADMIN — PANTOPRAZOLE SODIUM 40 MG: 40 TABLET, DELAYED RELEASE ORAL at 09:10

## 2025-06-14 RX ADMIN — ASPIRIN 81 MG: 81 TABLET, CHEWABLE ORAL at 09:10

## 2025-06-14 RX ADMIN — SODIUM CHLORIDE, PRESERVATIVE FREE 10 ML: 5 INJECTION INTRAVENOUS at 19:52

## 2025-06-14 RX ADMIN — SODIUM CHLORIDE, PRESERVATIVE FREE 10 ML: 5 INJECTION INTRAVENOUS at 09:15

## 2025-06-14 RX ADMIN — Medication 400 UNITS: at 09:14

## 2025-06-14 NOTE — FLOWSHEET NOTE
This RN received the patient at 0300 after the patient had stabilized post cardiac arrest. This morning this RN was talking with the patient, explaining what had happened to her over night due to the patient mentioning she did not know what had happened this evening. Eventually she questioned this RN if her daughter had been notified. At this time, I was not aware of the daughter not being called, but wanted to find out who had reached out to family. I looked over notes in the chart, contacted house supervisor, the previous nurse, the resident, and Jacquelyn NP if they had reached out. I was notified that not a single person notified the daughter of the significant event overnight. I immediately called the daughter twice with the hospital phone and was not able to leave a message due to the inbox being full, so I asked the patient if it was okay to use her phone to contact the daughter. The daughter did not answer the phone at this point either. After this, I contacted the 4D supervisor, Adam, to find out where I should go from here with this information. Day shift residents are aware, Jacquelyn NP is aware, and I will be notifying the oncoming nurse of this event if daughter is to show up or call before I am off shift.   0820 This RN contacted patients daughter again and updated daughter on situation and was very grateful that we were able to get a hold of her now.

## 2025-06-14 NOTE — PROGRESS NOTES
Complex Cardiology Brief Note:    Called by ICU with 30 second event of asystole with few compressions and return to atrial fib. Patient stable after event. Initially with mild hypotension - improved. POCUS reported as done with episode and only small pocket of pericardial fluid present; no evidence of tamponade. Continued monitoring on 3A. Labs obtained and reviewed - no significant findings. Hgb improved to 9.1. Mag 1.8. EKG reviewed - atrial fib; no significant change. CxR ordered.   0220 notified by resident that patient having what appears to be intermittent 2nd degree AVB - few beats and then returns to afib. Unable to document with EKG as episodes so brief. Patient aSx with /. Moving to 4D ICU for continued monitoring.  Dr. Rodriguez notified. Agrees with transfer to 4D. Continue with pacing pads on patient and Atropine at bedside. Continue monitoring - hold Amiodarone and BB. Will obtain repeat echo in AM.     Further recommendations based on results and clinical course. If any issues, please contact the Complex Cardiology/Structural Heart Service via Structural Heart on [employer]. (XOCHILT Owen - PRIYANKA on call).  Jacquelyn Covarrubias, XOCHILT - CNP

## 2025-06-14 NOTE — PROGRESS NOTES
Code blue called on patient at 2350 when she had a 30+ asystolic pause accompanied by apnea. She then went into a terrence arrhythmia that appeared to by a junctional bradycardia in the 30s and 40s. BP remained WNL but patient became nauseated with emesis. Resource nurses and ICU resident to bedside. Amio and blood transfusion stopped. EKG completed and labs sent. CHRISTOS Martinez called and updated. Will continue to hold amio and beta blockers. Patient denies pain and reports the nausea has subsided.

## 2025-06-14 NOTE — SIGNIFICANT EVENT
Name: Marlene Ray  YOB: 1940  MRN: [id number]  Date: 06/14/25     Significant Event        Code blue was called on patient. Prior to my arrival patient was alert and was vomiting.  Per nursing report, patient was in A-fib RVR and she went into asystole for approximately 30 seconds and had 5 compressions in her chest prior to her waking up.  Patient had a vomitus episode and stated that she felt better after approximately 30 minutes.  Her heart rate was noted to be in the 40s-50s.  CODE BLUE was not called overhead or via PS and hence we did not have EKG on the scene in order to capture rhythm.  Based on telemetry patient went back into A-fib with a heart rate in the 110s to 120s. EKG captured showed Afib with PVC with HR 68. Patient's vitals was stable with BP, HR and respiratory rate under control. Patient was receiving amiodarone and blood transfusions which was held prior during her asystole event.  Given concerns for AV trevor block which, I have discontinued her toporol that was initiated early today. Glucagon was deferred due to nausea and vomiting as well as her quick improvement. Patient placed on NPO  Repeat labs pending. Will reach out to nursing planning on reaching out to Cardiology NP for update and plan.     Electronically signed by Pablo Palm DO on 6/14/2025 at 1:33 AM

## 2025-06-14 NOTE — PLAN OF CARE
Problem: Discharge Planning  Goal: Discharge to home or other facility with appropriate resources  Outcome: Progressing  Flowsheets  Taken 6/14/2025 0413 by Kayden Howe RN  Discharge to home or other facility with appropriate resources: Identify barriers to discharge with patient and caregiver  Taken 6/13/2025 2000 by Grace Candelaria RN  Discharge to home or other facility with appropriate resources: Identify barriers to discharge with patient and caregiver     Problem: Pain  Goal: Verbalizes/displays adequate comfort level or baseline comfort level  Outcome: Progressing  Flowsheets (Taken 6/14/2025 0413)  Verbalizes/displays adequate comfort level or baseline comfort level: Assess pain using appropriate pain scale     Problem: Safety - Adult  Goal: Free from fall injury  Outcome: Progressing  Flowsheets (Taken 6/14/2025 0413)  Free From Fall Injury: Instruct family/caregiver on patient safety     Problem: ABCDS Injury Assessment  Goal: Absence of physical injury  Outcome: Progressing  Flowsheets (Taken 6/11/2025 0500 by Bozena Lamar, RN)  Absence of Physical Injury: Implement safety measures based on patient assessment     Problem: Cardiovascular - Adult  Goal: Maintains optimal cardiac output and hemodynamic stability  Outcome: Progressing  Flowsheets (Taken 6/14/2025 0413)  Maintains optimal cardiac output and hemodynamic stability:   Monitor blood pressure and heart rate   Assess for signs of decreased cardiac output     Problem: Cardiovascular - Adult  Goal: Absence of cardiac dysrhythmias or at baseline  Outcome: Progressing  Flowsheets (Taken 6/14/2025 0413)  Absence of cardiac dysrhythmias or at baseline:   Monitor cardiac rate and rhythm   Assess for signs of decreased cardiac output     Problem: Chronic Conditions and Co-morbidities  Goal: Patient's chronic conditions and co-morbidity symptoms are monitored and maintained or improved  Outcome: Progressing  Flowsheets  Taken 6/14/2025 0413 by

## 2025-06-14 NOTE — PROCEDURES
PROCEDURE NOTE  Date: 6/14/2025   Name: Marlene Ray  YOB: 1940    Procedures  12 lead EKG completed. Results handed to Pablo Palm DO.

## 2025-06-14 NOTE — PROGRESS NOTES
0300  Contacted Dr. Shah for clarification and update on cardiac rhythm. Clarified that patient having intermittent 2 - 3 second pauses and brief episodes of CHB with afib of adequate rate in between. HD stable. Writer now able to view rhythm strips uploaded in EMR. Patient being moved to 4D for close monitoring. Dr. Shah had been in communication with Dr. Rodriguez and had sent rhythm strips to him. Continue to monitor with pacer pads on and Atropine at bedside with ordered doses for Atropine use to treat symptomatic bradycardia.   Jacquelyn Covarrubias, APRN - CNP

## 2025-06-14 NOTE — PROGRESS NOTES
Structural Heart/Cardiology Progress Note    6/14/2025 7:47 AM    Procedure:  TAVR        POD #:  4    Subjective:      6/14  Resting in bed - sitting up  No complaints this morning  No further episodes of high grade block/asystole   Converted out of afib - In NSR in the 70s  Pericardial drain is out - dressing is saturated w/ serous fluid  Denies chest pain/pain at drain site  ECHO this morning for re-evaluation   S/p 1 unit of  PRBC - HGB 9.1 post transfusion - will obtain CBC this morning as well  Remains on plavix/ASA  Chest xray showing bilateral pleural effusions, on NC at 4L, weaning down. On Lasix at home, BNP up this morning, known volume resuscitation, will plan additional diuresis today  Pt denies orthopnea but does note her hands feel swollen     6/13  Resting in bed with daughter at bedside  No distress, bright, pleasant  Denies chest discomfort - some upper mid back soreness  Breathing stable - denies dyspnea at rest  No pain at pericardial drain site; no increase in drainage since 15 cc evacuated last alana  Pericardial drain remains open  Hgb 7.5 - nursing reports some WHITNEY when up  Mucous in throat; upper airway - lungs clear - xopenx Rx  SR with HR 90's to day (off Toprol per TAVR protocol)  Up in room and chair - tolerating well  Plavix and ASA restarted this AM  Echo this afternoon - stable - no pericardial fluid per Dr. Michael ingram      SR, VSS  6/12  Remains in ICU  Stable OVN with brief lower BP - Albumin  SR  Pericardial drain continues; 15 cc OVN  Drain clamped this AM with q8h aspiration attempts   CRP and ESR pending  Afebrile   WBC 13.6 (7.9, 6.9, 5.5)  Hgb 7.9 (8.2, 9.8, 10, 10.7)  K 3.9  Renal status stable  No chest discomfort today  CRP 8.95; ESR 38    6/11  Resting in bed w/ no concerns  Eating, drinking urinating  Has not been up walking yet  Denies pain     Vital Signs: BP (!) 143/45   Pulse 70   Temp 97.8 °F (36.6 °C) (Oral)   Resp 12   Ht 1.622 m (5' 3.86\")   Wt 64.1 kg (141  on ICU  Will need discharged with 30 day event monitor - no BB or amio use   Additional 20mg of lasix this afternoon  Start eliquis 5mg BID post repeat CBC  Continue Plavix  Stop ASA  Continue Colchicine QD x 3 months  Continue tylenol prn; add ibuprofen prn (with food)  Encourage po intake  Strict I & O  Daily wt  Up walking today   Change pericardial drain dressing   Call with any concern of hemodynamic instability  FULL CODE        The plan of care was discussed in detail with Dr. Rodriguez.  Will continue to monitor over the weekend. Ge up moving. Start OAC. Close monitoring of hemodynamics and signs of reoccurrence of cardiac tamponade. ECHO Monday, 30 day event monitor at D/C      Further recommendations based on results and clinical course. If any issues, please contact the Complex Cardiology/Structural Heart Service via Structural Heart on [employer]. (Samira Donis, XOCHILT - CNP  on call).

## 2025-06-14 NOTE — PROGRESS NOTES
Patient arrived to unit from  via bed. Patient transferred to ICU bed and placed on continuous ICU bedside monitor. Patient admitted for Severe aortic stenosis [I35.0]. Vitals obtained. See flowsheets. Patient's IV access includes 22g left AC 20g right forearm right IJ venous sheath. Current infusions and rates of infusion include n/a. Assessment completed by Kayden STRAUSS. Two nurse skin assessment completed by Kayden STRAUSS and Mariela RN. See flowsheets for assessment details. Policies and procedures of ICU able to be explained to patient at this time. Family member(s)/representative(s) present at time of admission include n/a. Patient rights explained to family member(s)/representatives and patient, as able. Patient/patient's family member(s)/representative(s) N/A to have physician notified of their admission. All questions posed by patient's family member(s)/representative(s) and patient answered at this time.

## 2025-06-14 NOTE — PROGRESS NOTES
I have seen and examined this patient along with the resident/YRIS and multidisciplinary team, including nursing, respiratory therapy and pharmacy, when available.  I have personally reviewed radiology images and reports, laboratory results, and other services recommendations.  I have reviewed the attached note, I agree with the documented findings that have edited the plan of care.  I also the following additions/highlights/modifications:    Doing better today after asystolic event last evening.  She was vomiting at the time and with 2 AV trevor blockers, I suspect that she had a vagal event.  Hemodynamics and respiratory status acceptable.  Echocardiogram discussed cardiology, no effusion or other concerning findings.  At this point, no further ICU needs.  Cardiology service agreeable for transfer.  Critical care team will sign off at this time.  Please call again anytime with new concerns or decompensation.  Thank you.    Electronically signed by:  David Echeverria M.D.  Critical care medicine  6/14/2025 4:46 PM      CRITICAL CARE PROGRESS NOTE      Patient:  Marlene Ray    Unit/Bed:[facility]007-  YOB: 1940  MRN: [id number]   PCP: Emanuel Michael,   Date of Admission: 6/10/2025  Chief Complaint:-Pericardial effusion    Assessment and Plan:    Asystole: Patient with 30 second run of asystole overnight 6/13. Chest compressions started and pt woke up after 5 compressions.  Patient with episode of nausea, vomiting after.  This was likely a vagal response due to intractable nausea, vomiting.  Cardiology messaged overnight.  Amiodarone, beta-blockers held.  Patient in sinus rhythm on exam today repeat echo ordered  Pericardial effusion with cardiac tamponade: Patient hypotensive and unresponsive after removal of TVP, rapid response was called. Stat echo showed pericardial effusion. Status post pericardiocentesis. Pericardial drain removed 6/13.  Repeat echo pending.  Severe symptomatic aortic stenosis:  S/p TAVR 6/10/2025  CAD: S/p PCI to left circumflex on 5/12/2025.  S/p PCI to RCA in 5/30/2025.  Continue Plavix  Paroxysmal atrial fibrillation: [id number] score of 6.  Patient was taking warfarin previously however stopped taking February 2025 due to GI bleed.  Started on Eliquis per cardiology 6/14.  Holding beta-blockers given episode of asystole  HTN: Patient on amlodipine, Toprol, lisinopril at home.  Continue monitoring at this time  Tobacco abuse: Patient smokes half pack per day. 32.7 total pack-year history.  Leukocytosis, resolved    INITIAL H AND P AND ICU COURSE:  Patient is a 85-year-old female with past medical history of A-fib, CAD, skin cancer, HTN, severe aortic stenosis who presented for TAVR on 6/10.  Patient was planned for discharge however became hypotensive and unresponsive after removal of TVP.  Stat echo showed pericardial effusion.  Patient urgently taken to Cath Lab.  Patient underwent pericardiocentesis with Dr. Rodriguez 200 cc fluid removed. Last echo 6/11 showed EF 55 to 60% with well sealed bioprosthetic aortic valve.    Past Medical History: A-fib, CAD, skin cancer, HTN.  Family History: Mother-breast cancer, diabetes, HTN.  Father-heart disease, cancer.  Social History: Current smoker, 32.7 total pack-year smoking history.  Reports occasional alcohol use.  Denies illicit drug use    Scheduled Meds:   furosemide  20 mg Oral Daily    [Held by provider] lisinopril  20 mg Oral Daily    amLODIPine  2.5 mg Oral Daily    colchicine  0.6 mg Oral Daily    pantoprazole  40 mg Oral QAM AC    sodium chloride flush  5-40 mL IntraVENous 2 times per day    vitamin C  1,000 mg Oral Daily    vitamin E  400 Units Oral Daily    aspirin  81 mg Oral Daily    atorvastatin  80 mg Oral QPM    ferrous sulfate  325 mg Oral Daily    clopidogrel  75 mg Oral Daily     Continuous Infusions:   sodium chloride      sodium chloride      sodium chloride         PHYSICAL EXAMINATION:  T:  98.2.  P:  84. RR:  17. B/P:

## 2025-06-14 NOTE — PROCEDURES
PROCEDURE NOTE  Date: 6/14/2025   Name: Marlene Ray  YOB: 1940    Procedures  12 lead EKG completed. Results handed to Kayden STRAUSS.

## 2025-06-14 NOTE — PROCEDURES
PROCEDURE NOTE  Date: 6/14/2025   Name: Marlene Ray  YOB: 1940    Procedures    12 lead EKG completed. Results handed to Dr. Naomi FOLEY.

## 2025-06-15 ENCOUNTER — APPOINTMENT (OUTPATIENT)
Dept: GENERAL RADIOLOGY | Age: 85
DRG: 267 | End: 2025-06-15
Attending: INTERNAL MEDICINE
Payer: MEDICARE

## 2025-06-15 LAB
ANION GAP SERPL CALC-SCNC: 10 MEQ/L (ref 8–16)
BUN SERPL-MCNC: 24 MG/DL (ref 8–23)
CALCIUM SERPL-MCNC: 9 MG/DL (ref 8.8–10.2)
CHLORIDE SERPL-SCNC: 103 MEQ/L (ref 98–111)
CO2 SERPL-SCNC: 24 MEQ/L (ref 22–29)
CREAT SERPL-MCNC: 0.8 MG/DL (ref 0.5–0.9)
DEPRECATED RDW RBC AUTO: 55.7 FL (ref 35–45)
EKG ATRIAL RATE: 83 BPM
EKG P AXIS: 33 DEGREES
EKG P-R INTERVAL: 188 MS
EKG Q-T INTERVAL: 394 MS
EKG QRS DURATION: 126 MS
EKG QTC CALCULATION (BAZETT): 462 MS
EKG R AXIS: -18 DEGREES
EKG T AXIS: 108 DEGREES
EKG VENTRICULAR RATE: 83 BPM
ERYTHROCYTE [DISTWIDTH] IN BLOOD BY AUTOMATED COUNT: 16.6 % (ref 11.5–14.5)
GFR SERPL CREATININE-BSD FRML MDRD: 72 ML/MIN/1.73M2
GLUCOSE SERPL-MCNC: 106 MG/DL (ref 74–109)
HCT VFR BLD AUTO: 28.4 % (ref 37–47)
HGB BLD-MCNC: 9.6 GM/DL (ref 12–16)
MCH RBC QN AUTO: 31.1 PG (ref 26–33)
MCHC RBC AUTO-ENTMCNC: 33.8 GM/DL (ref 32.2–35.5)
MCV RBC AUTO: 91.9 FL (ref 81–99)
PLATELET # BLD AUTO: 269 THOU/MM3 (ref 130–400)
PMV BLD AUTO: 9.6 FL (ref 9.4–12.4)
POTASSIUM SERPL-SCNC: 4 MEQ/L (ref 3.5–5.2)
RBC # BLD AUTO: 3.09 MILL/MM3 (ref 4.2–5.4)
SODIUM SERPL-SCNC: 137 MEQ/L (ref 135–145)
WBC # BLD AUTO: 6 THOU/MM3 (ref 4.8–10.8)

## 2025-06-15 PROCEDURE — 85027 COMPLETE CBC AUTOMATED: CPT

## 2025-06-15 PROCEDURE — 99221 1ST HOSP IP/OBS SF/LOW 40: CPT | Performed by: NURSE PRACTITIONER

## 2025-06-15 PROCEDURE — 6370000000 HC RX 637 (ALT 250 FOR IP): Performed by: INTERNAL MEDICINE

## 2025-06-15 PROCEDURE — 2500000003 HC RX 250 WO HCPCS: Performed by: NURSE PRACTITIONER

## 2025-06-15 PROCEDURE — 2500000003 HC RX 250 WO HCPCS: Performed by: INTERNAL MEDICINE

## 2025-06-15 PROCEDURE — 93010 ELECTROCARDIOGRAM REPORT: CPT | Performed by: INTERNAL MEDICINE

## 2025-06-15 PROCEDURE — 6370000000 HC RX 637 (ALT 250 FOR IP): Performed by: NURSE PRACTITIONER

## 2025-06-15 PROCEDURE — 6360000002 HC RX W HCPCS: Performed by: NURSE PRACTITIONER

## 2025-06-15 PROCEDURE — 71045 X-RAY EXAM CHEST 1 VIEW: CPT

## 2025-06-15 PROCEDURE — 93005 ELECTROCARDIOGRAM TRACING: CPT | Performed by: INTERNAL MEDICINE

## 2025-06-15 PROCEDURE — 80048 BASIC METABOLIC PNL TOTAL CA: CPT

## 2025-06-15 PROCEDURE — 2580000003 HC RX 258: Performed by: NURSE PRACTITIONER

## 2025-06-15 PROCEDURE — 2140000000 HC CCU INTERMEDIATE R&B

## 2025-06-15 PROCEDURE — 36415 COLL VENOUS BLD VENIPUNCTURE: CPT

## 2025-06-15 RX ORDER — BUMETANIDE 0.25 MG/ML
2 INJECTION, SOLUTION INTRAMUSCULAR; INTRAVENOUS ONCE
Status: COMPLETED | OUTPATIENT
Start: 2025-06-15 | End: 2025-06-15

## 2025-06-15 RX ORDER — DILTIAZEM HYDROCHLORIDE 5 MG/ML
0.25 INJECTION INTRAVENOUS ONCE
Status: DISCONTINUED | OUTPATIENT
Start: 2025-06-15 | End: 2025-06-15

## 2025-06-15 RX ORDER — DILTIAZEM HYDROCHLORIDE 5 MG/ML
10 INJECTION INTRAVENOUS ONCE
Status: COMPLETED | OUTPATIENT
Start: 2025-06-15 | End: 2025-06-15

## 2025-06-15 RX ADMIN — DILTIAZEM HYDROCHLORIDE 10 MG: 5 INJECTION, SOLUTION INTRAVENOUS at 13:49

## 2025-06-15 RX ADMIN — Medication 3 MG: at 23:13

## 2025-06-15 RX ADMIN — ACETAMINOPHEN 650 MG: 325 TABLET ORAL at 16:48

## 2025-06-15 RX ADMIN — OXYCODONE HYDROCHLORIDE AND ACETAMINOPHEN 1000 MG: 500 TABLET ORAL at 09:53

## 2025-06-15 RX ADMIN — COLCHICINE 0.6 MG: 0.6 TABLET, FILM COATED ORAL at 09:53

## 2025-06-15 RX ADMIN — FUROSEMIDE 20 MG: 20 TABLET ORAL at 09:53

## 2025-06-15 RX ADMIN — FERROUS SULFATE TAB 325 MG (65 MG ELEMENTAL FE) 325 MG: 325 (65 FE) TAB at 09:53

## 2025-06-15 RX ADMIN — PANTOPRAZOLE SODIUM 40 MG: 40 TABLET, DELAYED RELEASE ORAL at 11:43

## 2025-06-15 RX ADMIN — DILTIAZEM HYDROCHLORIDE 5 MG/HR: 5 INJECTION, SOLUTION INTRAVENOUS at 13:51

## 2025-06-15 RX ADMIN — APIXABAN 5 MG: 5 TABLET, FILM COATED ORAL at 21:11

## 2025-06-15 RX ADMIN — Medication 400 UNITS: at 09:54

## 2025-06-15 RX ADMIN — BISACODYL 5 MG: 5 TABLET, COATED ORAL at 21:11

## 2025-06-15 RX ADMIN — Medication 3 MG: at 00:05

## 2025-06-15 RX ADMIN — APIXABAN 5 MG: 5 TABLET, FILM COATED ORAL at 09:53

## 2025-06-15 RX ADMIN — CLOPIDOGREL BISULFATE 75 MG: 75 TABLET, FILM COATED ORAL at 09:53

## 2025-06-15 RX ADMIN — ATORVASTATIN CALCIUM 80 MG: 80 TABLET, FILM COATED ORAL at 21:11

## 2025-06-15 RX ADMIN — AMLODIPINE BESYLATE 2.5 MG: 5 TABLET ORAL at 09:52

## 2025-06-15 RX ADMIN — SODIUM CHLORIDE, PRESERVATIVE FREE 10 ML: 5 INJECTION INTRAVENOUS at 09:54

## 2025-06-15 RX ADMIN — BUMETANIDE 2 MG: 0.25 INJECTION INTRAMUSCULAR; INTRAVENOUS at 11:43

## 2025-06-15 ASSESSMENT — PAIN - FUNCTIONAL ASSESSMENT
PAIN_FUNCTIONAL_ASSESSMENT: PREVENTS OR INTERFERES SOME ACTIVE ACTIVITIES AND ADLS
PAIN_FUNCTIONAL_ASSESSMENT: PREVENTS OR INTERFERES SOME ACTIVE ACTIVITIES AND ADLS

## 2025-06-15 ASSESSMENT — PAIN DESCRIPTION - PAIN TYPE
TYPE: ACUTE PAIN
TYPE: ACUTE PAIN

## 2025-06-15 ASSESSMENT — PAIN DESCRIPTION - ONSET
ONSET: ON-GOING
ONSET: ON-GOING

## 2025-06-15 ASSESSMENT — PAIN SCALES - GENERAL
PAINLEVEL_OUTOF10: 0
PAINLEVEL_OUTOF10: 7
PAINLEVEL_OUTOF10: 4

## 2025-06-15 ASSESSMENT — PAIN DESCRIPTION - DESCRIPTORS
DESCRIPTORS: ACHING
DESCRIPTORS: ACHING

## 2025-06-15 ASSESSMENT — PAIN DESCRIPTION - FREQUENCY
FREQUENCY: CONTINUOUS
FREQUENCY: CONTINUOUS

## 2025-06-15 ASSESSMENT — PAIN DESCRIPTION - LOCATION
LOCATION: BACK
LOCATION: BACK

## 2025-06-15 ASSESSMENT — PAIN DESCRIPTION - ORIENTATION
ORIENTATION: MID;RIGHT
ORIENTATION: MID;RIGHT

## 2025-06-15 NOTE — PROGRESS NOTES
Structural Heart/Cardiology Progress Note    6/15/2025 11:41 AM    Procedure:  TAVR        POD #:  5    Subjective:      6/15  Stable over night  Converted back into afib RVR this am around 10- rate 130s-140s  Symptomatic - SOB/chest pressure  RA - oxygen saturation at 96%    Ordered chest xray showing continued bilateral pleural effusions - did urinate more w/ additional lasix yesterday evening  Ordered 2mg bumex IV push this morning as well  Denies chest pain/bloating/or leg swelling  CBC stable  Eliquis restarted yesterday   Dressing in placed over pericardial drain site - was not changed yesterday        6/14  Resting in bed - sitting up  No complaints this morning  No further episodes of high grade block/asystole   Converted out of afib - In NSR in the 70s  Pericardial drain is out - dressing is saturated w/ serous fluid  Denies chest pain/pain at drain site  ECHO this morning for re-evaluation   S/p 1 unit of  PRBC - HGB 9.1 post transfusion - will obtain CBC this morning as well  Remains on plavix/ASA  Chest xray showing bilateral pleural effusions, on NC at 4L, weaning down. On Lasix at home, BNP up this morning, known volume resuscitation, will plan additional diuresis today  Pt denies orthopnea but does note her hands feel swollen     6/13  Resting in bed with daughter at bedside  No distress, bright, pleasant  Denies chest discomfort - some upper mid back soreness  Breathing stable - denies dyspnea at rest  No pain at pericardial drain site; no increase in drainage since 15 cc evacuated last alana  Pericardial drain remains open  Hgb 7.5 - nursing reports some WHITNEY when up  Mucous in throat; upper airway - lungs clear - xopenx Rx  SR with HR 90's to day (off Toprol per TAVR protocol)  Up in room and chair - tolerating well  Plavix and ASA restarted this AM  Echo this afternoon - stable - no pericardial fluid per Dr. Michael ingram      SR, VSS  6/12  Remains in ICU  Stable OVN with brief lower BP -

## 2025-06-15 NOTE — PROCEDURES
PROCEDURE NOTE  Date: 6/15/2025   Name: Marlene Ray  YOB: 1940    Procedures    EKG Completed. Handed to RN.

## 2025-06-15 NOTE — PLAN OF CARE
Problem: Discharge Planning  Goal: Discharge to home or other facility with appropriate resources  Outcome: Progressing  Flowsheets (Taken 6/14/2025 0413 by Kadyen Howe, RN)  Discharge to home or other facility with appropriate resources: Identify barriers to discharge with patient and caregiver     Problem: Pain  Goal: Verbalizes/displays adequate comfort level or baseline comfort level  Outcome: Progressing  Flowsheets (Taken 6/14/2025 0413 by Kayden Howe, RN)  Verbalizes/displays adequate comfort level or baseline comfort level: Assess pain using appropriate pain scale     Problem: Safety - Adult  Goal: Free from fall injury  Outcome: Progressing  Flowsheets (Taken 6/14/2025 0413 by Kayden Howe RN)  Free From Fall Injury: Instruct family/caregiver on patient safety     Problem: ABCDS Injury Assessment  Goal: Absence of physical injury  Outcome: Progressing  Flowsheets (Taken 6/11/2025 0500 by Bozena Lamar RN)  Absence of Physical Injury: Implement safety measures based on patient assessment     Problem: Cardiovascular - Adult  Goal: Maintains optimal cardiac output and hemodynamic stability  Outcome: Progressing  Flowsheets (Taken 6/14/2025 0413 by Kayden Howe RN)  Maintains optimal cardiac output and hemodynamic stability:   Monitor blood pressure and heart rate   Assess for signs of decreased cardiac output  Goal: Absence of cardiac dysrhythmias or at baseline  Outcome: Progressing  Flowsheets (Taken 6/14/2025 0413 by Kayden Howe RN)  Absence of cardiac dysrhythmias or at baseline:   Monitor cardiac rate and rhythm   Assess for signs of decreased cardiac output     Problem: Chronic Conditions and Co-morbidities  Goal: Patient's chronic conditions and co-morbidity symptoms are monitored and maintained or improved  Outcome: Progressing  Flowsheets (Taken 6/14/2025 0413 by Kayden Howe RN)  Care Plan - Patient's Chronic Conditions and Co-Morbidity Symptoms are Monitored and Maintained or

## 2025-06-16 LAB
ANION GAP SERPL CALC-SCNC: 13 MEQ/L (ref 8–16)
BUN SERPL-MCNC: 27 MG/DL (ref 8–23)
CALCIUM SERPL-MCNC: 8.7 MG/DL (ref 8.8–10.2)
CHLORIDE SERPL-SCNC: 100 MEQ/L (ref 98–111)
CO2 SERPL-SCNC: 24 MEQ/L (ref 22–29)
CREAT SERPL-MCNC: 0.8 MG/DL (ref 0.5–0.9)
ECHO AV AREA PEAK VELOCITY: 1.2 CM2
ECHO AV AREA VTI: 1.3 CM2
ECHO AV AREA/BSA PEAK VELOCITY: 0.7 CM2/M2
ECHO AV AREA/BSA VTI: 0.8 CM2/M2
ECHO AV MEAN GRADIENT: 6 MMHG
ECHO AV MEAN VELOCITY: 1.1 M/S
ECHO AV PEAK GRADIENT: 11 MMHG
ECHO AV PEAK VELOCITY: 1.7 M/S
ECHO AV VELOCITY RATIO: 0.47
ECHO AV VTI: 35.6 CM
ECHO BSA: 1.63 M2
ECHO EST RA PRESSURE: 8 MMHG
ECHO LA AREA 2C: 22.1 CM2
ECHO LA AREA 4C: 21.1 CM2
ECHO LA DIAMETER INDEX: 2.38 CM/M2
ECHO LA DIAMETER: 4 CM
ECHO LA MAJOR AXIS: 5.6 CM
ECHO LA MINOR AXIS: 5.7 CM
ECHO LA VOL BP: 66 ML (ref 22–52)
ECHO LA VOL MOD A2C: 68 ML (ref 22–52)
ECHO LA VOL MOD A4C: 64 ML (ref 22–52)
ECHO LA VOL/BSA BIPLANE: 39 ML/M2 (ref 16–34)
ECHO LA VOLUME INDEX MOD A2C: 40 ML/M2 (ref 16–34)
ECHO LA VOLUME INDEX MOD A4C: 38 ML/M2 (ref 16–34)
ECHO LV E' LATERAL VELOCITY: 7.5 CM/S
ECHO LV E' SEPTAL VELOCITY: 6.6 CM/S
ECHO LV EF PHYSICIAN: 60 %
ECHO LV FRACTIONAL SHORTENING: 28 % (ref 28–44)
ECHO LV INTERNAL DIMENSION DIASTOLE INDEX: 1.9 CM/M2
ECHO LV INTERNAL DIMENSION DIASTOLIC: 3.2 CM (ref 3.9–5.3)
ECHO LV INTERNAL DIMENSION SYSTOLIC INDEX: 1.37 CM/M2
ECHO LV INTERNAL DIMENSION SYSTOLIC: 2.3 CM
ECHO LV IVSD: 1.5 CM (ref 0.6–0.9)
ECHO LV MASS 2D: 153 G (ref 67–162)
ECHO LV MASS INDEX 2D: 91.1 G/M2 (ref 43–95)
ECHO LV POSTERIOR WALL DIASTOLIC: 1.3 CM (ref 0.6–0.9)
ECHO LV RELATIVE WALL THICKNESS RATIO: 0.81
ECHO LVOT AREA: 2.5 CM2
ECHO LVOT AV VTI INDEX: 0.5
ECHO LVOT DIAM: 1.8 CM
ECHO LVOT MEAN GRADIENT: 1 MMHG
ECHO LVOT PEAK GRADIENT: 3 MMHG
ECHO LVOT PEAK VELOCITY: 0.8 M/S
ECHO LVOT STROKE VOLUME INDEX: 26.8 ML/M2
ECHO LVOT SV: 45 ML
ECHO LVOT VTI: 17.7 CM
ECHO MV A VELOCITY: 0.84 M/S
ECHO MV E DECELERATION TIME (DT): 141 MS
ECHO MV E VELOCITY: 1.43 M/S
ECHO MV E/A RATIO: 1.7
ECHO MV E/E' LATERAL: 19.07
ECHO MV E/E' RATIO (AVERAGED): 20.37
ECHO MV E/E' SEPTAL: 21.67
ECHO MV REGURGITANT PEAK GRADIENT: 38 MMHG
ECHO MV REGURGITANT PEAK VELOCITY: 3.1 M/S
ECHO PULMONARY ARTERY END DIASTOLIC PRESSURE: 7 MMHG
ECHO PV MAX VELOCITY: 1 M/S
ECHO PV PEAK GRADIENT: 4 MMHG
ECHO PV REGURGITANT MAX VELOCITY: 1.3 M/S
ECHO RIGHT VENTRICULAR SYSTOLIC PRESSURE (RVSP): 41 MMHG
ECHO RV INTERNAL DIMENSION: 2.5 CM
ECHO RV TAPSE: 1.6 CM (ref 1.7–?)
ECHO TV E WAVE: 0.5 M/S
ECHO TV REGURGITANT MAX VELOCITY: 2.87 M/S
ECHO TV REGURGITANT PEAK GRADIENT: 33 MMHG
EKG ATRIAL RATE: 74 BPM
EKG ATRIAL RATE: 80 BPM
EKG P AXIS: 34 DEGREES
EKG P AXIS: 61 DEGREES
EKG P-R INTERVAL: 200 MS
EKG P-R INTERVAL: 208 MS
EKG Q-T INTERVAL: 396 MS
EKG Q-T INTERVAL: 400 MS
EKG Q-T INTERVAL: 416 MS
EKG QRS DURATION: 128 MS
EKG QRS DURATION: 130 MS
EKG QRS DURATION: 132 MS
EKG QTC CALCULATION (BAZETT): 461 MS
EKG QTC CALCULATION (BAZETT): 461 MS
EKG QTC CALCULATION (BAZETT): 479 MS
EKG R AXIS: -12 DEGREES
EKG R AXIS: -14 DEGREES
EKG R AXIS: -14 DEGREES
EKG T AXIS: 105 DEGREES
EKG T AXIS: 116 DEGREES
EKG T AXIS: 124 DEGREES
EKG VENTRICULAR RATE: 74 BPM
EKG VENTRICULAR RATE: 80 BPM
EKG VENTRICULAR RATE: 88 BPM
GFR SERPL CREATININE-BSD FRML MDRD: 72 ML/MIN/1.73M2
GLUCOSE SERPL-MCNC: 118 MG/DL (ref 74–109)
NT-PROBNP SERPL IA-MCNC: 9354 PG/ML (ref 0–449)
POTASSIUM SERPL-SCNC: 3.6 MEQ/L (ref 3.5–5.2)
SODIUM SERPL-SCNC: 137 MEQ/L (ref 135–145)

## 2025-06-16 PROCEDURE — 93306 TTE W/DOPPLER COMPLETE: CPT | Performed by: INTERNAL MEDICINE

## 2025-06-16 PROCEDURE — 2500000003 HC RX 250 WO HCPCS: Performed by: NURSE PRACTITIONER

## 2025-06-16 PROCEDURE — 6370000000 HC RX 637 (ALT 250 FOR IP): Performed by: NURSE PRACTITIONER

## 2025-06-16 PROCEDURE — 6370000000 HC RX 637 (ALT 250 FOR IP): Performed by: INTERNAL MEDICINE

## 2025-06-16 PROCEDURE — 99233 SBSQ HOSP IP/OBS HIGH 50: CPT | Performed by: NURSE PRACTITIONER

## 2025-06-16 PROCEDURE — 80048 BASIC METABOLIC PNL TOTAL CA: CPT

## 2025-06-16 PROCEDURE — 97530 THERAPEUTIC ACTIVITIES: CPT

## 2025-06-16 PROCEDURE — 93010 ELECTROCARDIOGRAM REPORT: CPT | Performed by: INTERNAL MEDICINE

## 2025-06-16 PROCEDURE — 93005 ELECTROCARDIOGRAM TRACING: CPT | Performed by: INTERNAL MEDICINE

## 2025-06-16 PROCEDURE — 93307 TTE W/O DOPPLER COMPLETE: CPT | Performed by: INTERNAL MEDICINE

## 2025-06-16 PROCEDURE — 97116 GAIT TRAINING THERAPY: CPT

## 2025-06-16 PROCEDURE — 97162 PT EVAL MOD COMPLEX 30 MIN: CPT

## 2025-06-16 PROCEDURE — 2580000003 HC RX 258: Performed by: NURSE PRACTITIONER

## 2025-06-16 PROCEDURE — 36415 COLL VENOUS BLD VENIPUNCTURE: CPT

## 2025-06-16 PROCEDURE — 97110 THERAPEUTIC EXERCISES: CPT

## 2025-06-16 PROCEDURE — 2140000000 HC CCU INTERMEDIATE R&B

## 2025-06-16 PROCEDURE — 2500000003 HC RX 250 WO HCPCS: Performed by: INTERNAL MEDICINE

## 2025-06-16 PROCEDURE — 83880 ASSAY OF NATRIURETIC PEPTIDE: CPT

## 2025-06-16 PROCEDURE — 93005 ELECTROCARDIOGRAM TRACING: CPT | Performed by: NURSE PRACTITIONER

## 2025-06-16 RX ORDER — ALPRAZOLAM 0.25 MG
0.25 TABLET ORAL NIGHTLY PRN
Status: DISCONTINUED | OUTPATIENT
Start: 2025-06-16 | End: 2025-06-18 | Stop reason: HOSPADM

## 2025-06-16 RX ORDER — FUROSEMIDE 40 MG/1
40 TABLET ORAL 2 TIMES DAILY
Status: DISCONTINUED | OUTPATIENT
Start: 2025-06-16 | End: 2025-06-18

## 2025-06-16 RX ORDER — COLCHICINE 0.6 MG/1
0.3 TABLET ORAL EVERY OTHER DAY
Status: DISCONTINUED | OUTPATIENT
Start: 2025-06-18 | End: 2025-06-18 | Stop reason: HOSPADM

## 2025-06-16 RX ORDER — FUROSEMIDE 40 MG/1
40 TABLET ORAL DAILY
Status: DISCONTINUED | OUTPATIENT
Start: 2025-06-16 | End: 2025-06-16

## 2025-06-16 RX ORDER — DILTIAZEM HYDROCHLORIDE 120 MG/1
120 CAPSULE, COATED, EXTENDED RELEASE ORAL DAILY
Status: DISCONTINUED | OUTPATIENT
Start: 2025-06-16 | End: 2025-06-18 | Stop reason: HOSPADM

## 2025-06-16 RX ADMIN — FUROSEMIDE 40 MG: 40 TABLET ORAL at 20:56

## 2025-06-16 RX ADMIN — BISACODYL 5 MG: 5 TABLET, COATED ORAL at 20:59

## 2025-06-16 RX ADMIN — OXYCODONE HYDROCHLORIDE AND ACETAMINOPHEN 1000 MG: 500 TABLET ORAL at 11:21

## 2025-06-16 RX ADMIN — COLCHICINE 0.6 MG: 0.6 TABLET, FILM COATED ORAL at 08:37

## 2025-06-16 RX ADMIN — CLOPIDOGREL BISULFATE 75 MG: 75 TABLET, FILM COATED ORAL at 08:37

## 2025-06-16 RX ADMIN — DILTIAZEM HYDROCHLORIDE 120 MG: 120 CAPSULE, EXTENDED RELEASE ORAL at 13:05

## 2025-06-16 RX ADMIN — FUROSEMIDE 40 MG: 40 TABLET ORAL at 11:33

## 2025-06-16 RX ADMIN — SODIUM CHLORIDE, PRESERVATIVE FREE 10 ML: 5 INJECTION INTRAVENOUS at 20:57

## 2025-06-16 RX ADMIN — ATORVASTATIN CALCIUM 80 MG: 80 TABLET, FILM COATED ORAL at 20:56

## 2025-06-16 RX ADMIN — APIXABAN 5 MG: 5 TABLET, FILM COATED ORAL at 20:56

## 2025-06-16 RX ADMIN — DILTIAZEM HYDROCHLORIDE 5 MG/HR: 5 INJECTION, SOLUTION INTRAVENOUS at 08:40

## 2025-06-16 RX ADMIN — PANTOPRAZOLE SODIUM 40 MG: 40 TABLET, DELAYED RELEASE ORAL at 08:37

## 2025-06-16 RX ADMIN — APIXABAN 5 MG: 5 TABLET, FILM COATED ORAL at 08:37

## 2025-06-16 RX ADMIN — FERROUS SULFATE TAB 325 MG (65 MG ELEMENTAL FE) 325 MG: 325 (65 FE) TAB at 11:21

## 2025-06-16 RX ADMIN — ALPRAZOLAM 0.25 MG: 0.25 TABLET ORAL at 22:59

## 2025-06-16 RX ADMIN — Medication 3 MG: at 22:59

## 2025-06-16 RX ADMIN — ACETAMINOPHEN 650 MG: 325 TABLET ORAL at 02:16

## 2025-06-16 RX ADMIN — Medication 400 UNITS: at 11:21

## 2025-06-16 ASSESSMENT — PAIN DESCRIPTION - LOCATION: LOCATION: GENERALIZED

## 2025-06-16 ASSESSMENT — PAIN SCALES - GENERAL
PAINLEVEL_OUTOF10: 3
PAINLEVEL_OUTOF10: 0

## 2025-06-16 ASSESSMENT — PAIN DESCRIPTION - DESCRIPTORS: DESCRIPTORS: DISCOMFORT

## 2025-06-16 ASSESSMENT — PAIN - FUNCTIONAL ASSESSMENT: PAIN_FUNCTIONAL_ASSESSMENT: ACTIVITIES ARE NOT PREVENTED

## 2025-06-16 NOTE — PROGRESS NOTES
[facility]  INPATIENT PHYSICAL THERAPY  EVALUATION  [facility] CCU-STEPDOWN 3B - 3B-31/031-A    Discharge Recommendations: Continue to assess pending progress, Home with assist PRN, anticipate cardiac rehab needs  Equipment Recommendations: No             Time In: 1011  Time Out: 1057  Timed Code Treatment Minutes: 38 Minutes  Minutes: 46          Date: 2025  Patient Name: Marlene Ray,  Gender:  female        MRN: [id number]  : 1940  (85 y.o.)      Referring Practitioner: Elkin Yarbrough MD  Diagnosis: Severe aortic stenosis  Additional Pertinent Hx: TAVR completed by Dr Parra on 6/10.  Patient was planned for discharge and transvenous pacer was removed.  She had become hypotensive and unresponsive after removal of TVP and a rapid response was called.  Stat echo showed pericardial effusion causing right ventricular pain.  Patient was urgently taken to the Cath Lab.  Echo  showed EF 55-60%, well-seated bioprosthetic aortic valve and small pericardial effusion present.  Patient underwent pericardiocentesis with Dr. Rodriguez 800 cc fluid removed. Code blue was called on patient on . Prior to my arrival patient was alert and was vomiting.  Per nursing report, patient was in A-fib RVR and she went into asystole for approximately 30 seconds and had 5 compressions in her chest prior to her waking up.  Patient had a vomitus episode and stated that she felt better after approximately 30 minutes.  Her heart rate was noted to be in the 40s-50s.  CODE BLUE was not called overhead or via PS and hence we did not have EKG on the scene in order to capture rhythm.  Based on telemetry patient went back into A-fib with a heart rate in the 110s to 120s. EKG captured showed Afib with PVC with HR 68.     Restrictions/Precautions:  Restrictions/Precautions: General Precautions, Fall Risk       Other Position/Activity Restrictions: monitor HR and O2    Required Braces or Orthoses?: No      Subjective:  Chart  Requiring Skilled Therapeutic Intervention: Decreased functional mobility , Decreased endurance, Decreased posture  Assessment: Marlene Ray is a 85 y.o. female who presents with the deficits stated previously. Pt requires 1 person assist for functional tasks with use of no AD for support. Pt cont to require skilled PT services to increase IND with functional tasks and progress towards PLOF to return to home environment safely.   Therapy Prognosis: Good    Requires PT Follow-Up: Yes    Patient Education:      .    Patient Education  Education Given To: Family, Patient  Education Provided: Role of Therapy, Plan of Care, Transfer Training, Mobility Training  Education Method: Verbal  Education Outcome: Verbalized understanding, Demonstrated understanding, Continued education needed       Plan:  Current Treatment Recommendations: Strengthening, Balance training, Gait training, Stair training, Functional mobility training, Transfer training, Neuromuscular re-education, Endurance training, Equipment evaluation, education, & procurement, Patient/Caregiver education & training, Safety education & training, Therapeutic activities, Home exercise program  General Plan:  (3-5x TAVR)    Goals:  Patient Goals : get back to my normal self  Short Term Goals  Time Frame for Short Term Goals: by discharge  Short Term Goal 1: Pt will demo IND with bed mobility tasks with bed flat and no rail to return home safely.  Short Term Goal 2: Pt will demo sit to/from stand transfers with IND with LRAD to return home safely.  Short Term Goal 3: Pt will demo IND with gait for > 300 feet with LRAD to return home safely.  Short Term Goal 4: Pt will demo IND with stair negotiation with no rail and LRAD to return home safely.  Long Term Goals  Time Frame for Long Term Goals : NA due to short ELOS    Following session, patient left in safe position. Pt in bedside chair following session, all needs and call light in reach, alarm on.

## 2025-06-16 NOTE — PROCEDURES
PROCEDURE NOTE  Date: 6/16/2025   Name: Marlene Ray  YOB: 1940    Procedures    12 lead EKG completed. Results handed to Cherelle STRAUSS.

## 2025-06-16 NOTE — CARE COORDINATION
6/16/25, 2:19 PM EDT    DISCHARGE ON GOING EVALUATION    Marlene Ray       [facility] day: 6  Location: 3B-31/031-A Reason for admit: Severe aortic stenosis [I35.0]     Procedures:   6/10 TAVR procedure with Dr. Rodriguez.   6/11 ECHO: EF 55-60%. Aortic Valve: Appropriately positioned transcatheter bioprosthetic valve that is well-seated. Right Atrium: Right atrium is moderately dilated. Pericardium: Small (<1 cm) pericardial effusion present. No indication of cardiac tamponade.   6/11 Pericardiocentesis with Dr. Rodriguez: 200 mL blood removed.   6/12 Limited Echo: EF 60-65%. Aortic Valve: Appropriately positioned transcatheter bioprosthetic valve that is well-seated. Pericardium: Small (<1 cm) pericardial effusion present.   6/13 Echo: EF 55-60%. Aortic Valve: Appropriately positioned transcatheter bioprosthetic valve. Pericardium: Small (<1 cm) pericardial effusion present. No indication of cardiac tamponade. Medium sized left pleural effusion.   6/14 Echo: EF 60-65%. Aortic Valve: Appropriately positioned transcatheter bioprosthetic valve that is well-seated. Tricuspid Valve: Moderate regurgitation. Left atrium is moderately dilated. Trivial pericardial effusion present.   6/16 KELTON/CV: ordered.     Imaging since last note:   6/12 PCXR: There is a new transcatheter aortic valve replacement in expected   position. There is a right IJ sheath in the SVC. There is a pigtail catheter projecting over the left heart border. Opacity in the left lung base may be small effusion, atelectasis, and/or infiltrate. There is no evidence of a pneumothorax. Cardiac silhouette appears mildly enlarged.  6/14 PCXR:   1. Pericardial drain removed. Cardiomegaly remains unchanged.  2. Moderate bilateral pleural effusions and bibasilar lung opacities   likely representing atelectasis have slightly increased. Recommend   follow-up.  6/15 PCXR:   1. Small bilateral pleural effusions with adjacent atelectasis/infiltrate.  2. Mild stable

## 2025-06-16 NOTE — H&P
[facility]  Sedation/Analgesia History & Physical    Pt Name: Marlene Ray  Account number: [id number]  MRN: [id number]  YOB: 1940  Provider Performing Procedure: Raffi Rodriguez MD MD  Referring Provider: Raffi Rodriguez MD   Primary Care Physician: Emanuel Michael DO  Date: 6/16/2025    PRE-PROCEDURE    Code Status: FULL CODE  Brief History/Pre-Procedure Diagnosis:   Afib RVR    Consent: : I have discussed with the patient risks, benefits, and alternatives (and relevant risks, benefits, and side effects related to alternatives or not receiving care), and likelihood of the success.   The patient and/or representative appear to understand and agree to proceed.  The discussion encompasses risks, benefits, and side effects related to the alternatives and the risks related to not receiving the proposed care, treatment, and services.     The indication, risks and benefits of the procedure and possible therapeutic consequences and alternatives were discussed with the patient. The patient was given the opportunity to ask questions and to have them answered to his/her satisfaction. Risks of the procedure include but are not limited to the following: Bleeding, hematoma including retroperitoneal hemmorhage, infection, pain and discomfort, injury to the aorta and other blood vessels, rhythm disturbance, low blood pressure, myocardial infarction, stroke, kidney damage/failure, myocardial perforation, allergic reactions to sedatives/contrast material, loss of pulse/vascular compromise requiring surgery, aneurysm/pseudoaneurysm formation, possible loss of a limb/hand/leg, needing blood transfusion, requiring emergent open heart surgery or emergent coronary intervention, the need for intubation/respiratory support, the requirement for defibrillation/cardioversion, and death. Alternatives to and omission of the suggested procedure were discussed. The patient had no further questions and wished

## 2025-06-16 NOTE — PLAN OF CARE
Problem: Discharge Planning  Goal: Discharge to home or other facility with appropriate resources  Outcome: Progressing  Flowsheets  Taken 6/16/2025 0335  Discharge to home or other facility with appropriate resources:   Identify barriers to discharge with patient and caregiver   Arrange for needed discharge resources and transportation as appropriate  Taken 6/15/2025 1958  Discharge to home or other facility with appropriate resources: Identify barriers to discharge with patient and caregiver     Problem: Pain  Goal: Verbalizes/displays adequate comfort level or baseline comfort level  Outcome: Progressing  Flowsheets (Taken 6/16/2025 0335)  Verbalizes/displays adequate comfort level or baseline comfort level:   Assess pain using appropriate pain scale   Encourage patient to monitor pain and request assistance     Problem: Safety - Adult  Goal: Free from fall injury  Outcome: Progressing  Flowsheets (Taken 6/16/2025 0335)  Free From Fall Injury: Instruct family/caregiver on patient safety     Problem: ABCDS Injury Assessment  Goal: Absence of physical injury  Outcome: Progressing  Flowsheets (Taken 6/16/2025 0335)  Absence of Physical Injury: Implement safety measures based on patient assessment     Problem: Cardiovascular - Adult  Goal: Maintains optimal cardiac output and hemodynamic stability  Outcome: Progressing  Flowsheets  Taken 6/16/2025 0335  Maintains optimal cardiac output and hemodynamic stability:   Monitor blood pressure and heart rate   Monitor urine output and notify Licensed Independent Practitioner for values outside of normal range  Taken 6/15/2025 1958  Maintains optimal cardiac output and hemodynamic stability: Monitor blood pressure and heart rate  Goal: Absence of cardiac dysrhythmias or at baseline  Outcome: Progressing  Flowsheets  Taken 6/16/2025 0335  Absence of cardiac dysrhythmias or at baseline: Monitor cardiac rate and rhythm  Taken 6/15/2025 1958  Absence of cardiac dysrhythmias or

## 2025-06-16 NOTE — PROCEDURES
PROCEDURE NOTE  Date: 6/16/2025   Name: Marlene Ray  YOB: 1940    Procedures  12 lead EKG completed. Results handed to Renetta STRAUSS.

## 2025-06-16 NOTE — PROGRESS NOTES
[facility]  OCCUPATIONAL THERAPY MISSED TREATMENT NOTE  [facility] CCU-STEPDOWN 3B  3B-31/031-A      Date: 2025  Patient Name: Marlene Ray        CSN: [id number]   : 1940  (85 y.o.)  Gender: female                REASON FOR MISSED TREATMENT: OT attempted X2 this date. At 730, pt was just back to bed after using BSC, fatigue noted, with RN reporting likely going for KELTON/cardioversion later this date.    Checked back later in AM, pt had converted to sinus rhythm and cardioversion canceled. Pt was working with PT when checked at 1045. Will check back as able

## 2025-06-16 NOTE — PROGRESS NOTES
Structural Heart/Cardiology Progress Note    6/16/2025 6:44 AM    Procedure:  TAVR        POD #:  6    Subjective:      6/16  Ok overnight - had a brief episode of hypoxia - noted upper respiratory congestion w/ clearance via cough and improvement of oxygenation - put on 2L NC, saturation now at 97%  Cardizem bolus w/ gtt started yesterday - now rate controlled in the 80s, aflutter  BP stable  Diuresed yesterday w/ 2mg IV bumex - UOP 1.6L  Ordering BNP this morning  Pt notes some improvement of her chest pressure/SOB but still feels winded w/ conversation.     6/15  Stable over night  Converted back into afib RVR this am around 10- rate 130s-140s  Symptomatic - SOB/chest pressure  RA - oxygen saturation at 96%    Ordered chest xray showing continued bilateral pleural effusions - did urinate more w/ additional lasix yesterday evening  Ordered 2mg bumex IV push this morning as well  Denies chest pain/bloating/or leg swelling  CBC stable  Eliquis restarted yesterday   Dressing in placed over pericardial drain site - was not changed yesterday        6/14  Resting in bed - sitting up  No complaints this morning  No further episodes of high grade block/asystole   Converted out of afib - In NSR in the 70s  Pericardial drain is out - dressing is saturated w/ serous fluid  Denies chest pain/pain at drain site  ECHO this morning for re-evaluation   S/p 1 unit of  PRBC - HGB 9.1 post transfusion - will obtain CBC this morning as well  Remains on plavix/ASA  Chest xray showing bilateral pleural effusions, on NC at 4L, weaning down. On Lasix at home, BNP up this morning, known volume resuscitation, will plan additional diuresis today  Pt denies orthopnea but does note her hands feel swollen     6/13  Resting in bed with daughter at bedside  No distress, bright, pleasant  Denies chest discomfort - some upper mid back soreness  Breathing stable - denies dyspnea at rest  No pain at pericardial drain site; no increase in  causing RV collapse. Pt taken to cath lab for pericardial drain insertion and transferred to ICU post procedure.     Severe AS s/p TAVR 6/10    Pericardial effusion w/ cardiac tamponade s/p pericardiocentesis - removed 6/13  200cc out initially --> 100--> 15 --> 15  Aspirated last alana - mild WHITNEY and lower BP  Albumin 25% x 1, Lasix 20 mg po  Drain removed 6/13  HDS OVN - brief episode lower BP; Albumin  No evidence of recurrent tamponade  Hgb remains stable     Possible pericarditis  CRP 8.5  ESR 38  Afebrile   WBC 7.9 9.2, 13.6 (7.9, 6.9, 5.5)  Chest wall pain now gone  Some discomfort in her back again    CAD   S/p PCI to RCA 5/30/25  S/p PCI to LCX 5/12  Plavix- restarted this AM (6/13)  ASA stopped 6/14    Paroxysmal Afib   No OAC on admission - watchman work up  ? Hx of AVMs   Eliquis started 6/14  Afib RVR 6/15  Cardizem bolus - 10mg followed by gtt  Remains in afib/flutter, rate in the 80s      Plan:   KELTON DCCV today   Keep NPO  Will need discharged with 30 day event monitor - no BB or amio use   Increase Lasix to 40mg po daily   continue eliquis 5mg BID  Continue Plavix  Continue Colchicine QD x 3 months  Continue tylenol prn; add ibuprofen prn (with food)  Encourage po intake  Strict I & O  Daily wt  Up walking/to chair when able  Call with any concern of hemodynamic instability  FULL CODE    The plan of care was discussed in detail with Dr. Rodriguez.    Further recommendations based on results and clinical course. If any issues, please contact the Complex Cardiology/Structural Heart Service via [facility] on [address]. (Samira Donis, APRN - CNP  on call).

## 2025-06-16 NOTE — PLAN OF CARE
Problem: Discharge Planning  Goal: Discharge to home or other facility with appropriate resources  6/16/2025 1357 by Renetta Hunt RN  Outcome: Progressing  Flowsheets (Taken 6/16/2025 1357)  Discharge to home or other facility with appropriate resources:   Identify barriers to discharge with patient and caregiver   Arrange for needed discharge resources and transportation as appropriate     Problem: Pain  Goal: Verbalizes/displays adequate comfort level or baseline comfort level  6/16/2025 1357 by Renetta Hunt RN  Outcome: Progressing  Flowsheets (Taken 6/16/2025 1357)  Verbalizes/displays adequate comfort level or baseline comfort level:   Encourage patient to monitor pain and request assistance   Assess pain using appropriate pain scale   Administer analgesics based on type and severity of pain and evaluate response   Implement non-pharmacological measures as appropriate and evaluate response     Problem: Safety - Adult  Goal: Free from fall injury  6/16/2025 1357 by Renetta Hunt RN  Outcome: Progressing  Flowsheets (Taken 6/16/2025 1357)  Free From Fall Injury: Instruct family/caregiver on patient safety     Problem: ABCDS Injury Assessment  Goal: Absence of physical injury  6/16/2025 1357 by Renetta Hunt RN  Outcome: Progressing  Flowsheets (Taken 6/16/2025 1357)  Absence of Physical Injury: Implement safety measures based on patient assessment     Problem: Cardiovascular - Adult  Goal: Maintains optimal cardiac output and hemodynamic stability  6/16/2025 1357 by Renetta Hunt RN  Outcome: Progressing  Flowsheets (Taken 6/16/2025 1357)  Maintains optimal cardiac output and hemodynamic stability:   Monitor blood pressure and heart rate   Assess for signs of decreased cardiac output     Problem: Cardiovascular - Adult  Goal: Absence of cardiac dysrhythmias or at baseline  6/16/2025 1357 by Renetta Hunt RN  Outcome: Progressing  Flowsheets (Taken 6/16/2025 1357)  Absence of cardiac dysrhythmias or at  baseline:   Monitor cardiac rate and rhythm   Assess for signs of decreased cardiac output   Administer antiarrhythmia medication and electrolyte replacement as ordered     Care plan reviewed with patient.  Patient verbalizes understanding of the care plan and contributed to goal setting.

## 2025-06-17 LAB
ANION GAP SERPL CALC-SCNC: 14 MEQ/L (ref 8–16)
BACTERIA BLD AEROBE CULT: NORMAL
BACTERIA BLD AEROBE CULT: NORMAL
BUN SERPL-MCNC: 20 MG/DL (ref 8–23)
CALCIUM SERPL-MCNC: 9.1 MG/DL (ref 8.8–10.2)
CHLORIDE SERPL-SCNC: 100 MEQ/L (ref 98–111)
CO2 SERPL-SCNC: 26 MEQ/L (ref 22–29)
CREAT SERPL-MCNC: 0.8 MG/DL (ref 0.5–0.9)
CRP SERPL-MCNC: 5.78 MG/DL (ref 0–0.5)
EKG ATRIAL RATE: 78 BPM
EKG P AXIS: 37 DEGREES
EKG P-R INTERVAL: 188 MS
EKG Q-T INTERVAL: 416 MS
EKG QRS DURATION: 134 MS
EKG QTC CALCULATION (BAZETT): 474 MS
EKG R AXIS: -22 DEGREES
EKG T AXIS: 106 DEGREES
EKG VENTRICULAR RATE: 78 BPM
ERYTHROCYTE [SEDIMENTATION RATE] IN BLOOD BY WESTERGREN METHOD: 75 MM/HR (ref 0–20)
GFR SERPL CREATININE-BSD FRML MDRD: 72 ML/MIN/1.73M2
GLUCOSE SERPL-MCNC: 97 MG/DL (ref 74–109)
POTASSIUM SERPL-SCNC: 3.2 MEQ/L (ref 3.5–5.2)
SODIUM SERPL-SCNC: 140 MEQ/L (ref 135–145)

## 2025-06-17 PROCEDURE — 93010 ELECTROCARDIOGRAM REPORT: CPT | Performed by: INTERNAL MEDICINE

## 2025-06-17 PROCEDURE — 80048 BASIC METABOLIC PNL TOTAL CA: CPT

## 2025-06-17 PROCEDURE — 97535 SELF CARE MNGMENT TRAINING: CPT

## 2025-06-17 PROCEDURE — 6360000002 HC RX W HCPCS: Performed by: NURSE PRACTITIONER

## 2025-06-17 PROCEDURE — 97166 OT EVAL MOD COMPLEX 45 MIN: CPT

## 2025-06-17 PROCEDURE — 99221 1ST HOSP IP/OBS SF/LOW 40: CPT | Performed by: NURSE PRACTITIONER

## 2025-06-17 PROCEDURE — 6370000000 HC RX 637 (ALT 250 FOR IP): Performed by: INTERNAL MEDICINE

## 2025-06-17 PROCEDURE — 93005 ELECTROCARDIOGRAM TRACING: CPT | Performed by: INTERNAL MEDICINE

## 2025-06-17 PROCEDURE — 6370000000 HC RX 637 (ALT 250 FOR IP): Performed by: NURSE PRACTITIONER

## 2025-06-17 PROCEDURE — 85651 RBC SED RATE NONAUTOMATED: CPT

## 2025-06-17 PROCEDURE — 2140000000 HC CCU INTERMEDIATE R&B

## 2025-06-17 PROCEDURE — 86140 C-REACTIVE PROTEIN: CPT

## 2025-06-17 PROCEDURE — 36415 COLL VENOUS BLD VENIPUNCTURE: CPT

## 2025-06-17 PROCEDURE — 2500000003 HC RX 250 WO HCPCS: Performed by: INTERNAL MEDICINE

## 2025-06-17 RX ORDER — BUMETANIDE 0.25 MG/ML
2 INJECTION, SOLUTION INTRAMUSCULAR; INTRAVENOUS ONCE
Status: COMPLETED | OUTPATIENT
Start: 2025-06-17 | End: 2025-06-17

## 2025-06-17 RX ORDER — POTASSIUM CHLORIDE 1500 MG/1
40 TABLET, EXTENDED RELEASE ORAL 2 TIMES DAILY WITH MEALS
Status: COMPLETED | OUTPATIENT
Start: 2025-06-17 | End: 2025-06-17

## 2025-06-17 RX ADMIN — Medication 3 MG: at 23:29

## 2025-06-17 RX ADMIN — DILTIAZEM HYDROCHLORIDE 120 MG: 120 CAPSULE, EXTENDED RELEASE ORAL at 10:29

## 2025-06-17 RX ADMIN — SODIUM CHLORIDE, PRESERVATIVE FREE 10 ML: 5 INJECTION INTRAVENOUS at 08:51

## 2025-06-17 RX ADMIN — PANTOPRAZOLE SODIUM 40 MG: 40 TABLET, DELAYED RELEASE ORAL at 08:51

## 2025-06-17 RX ADMIN — APIXABAN 5 MG: 5 TABLET, FILM COATED ORAL at 21:06

## 2025-06-17 RX ADMIN — POTASSIUM CHLORIDE 40 MEQ: 1500 TABLET, EXTENDED RELEASE ORAL at 18:11

## 2025-06-17 RX ADMIN — BUMETANIDE 2 MG: 0.25 INJECTION INTRAMUSCULAR; INTRAVENOUS at 08:51

## 2025-06-17 RX ADMIN — FERROUS SULFATE TAB 325 MG (65 MG ELEMENTAL FE) 325 MG: 325 (65 FE) TAB at 08:51

## 2025-06-17 RX ADMIN — APIXABAN 5 MG: 5 TABLET, FILM COATED ORAL at 08:51

## 2025-06-17 RX ADMIN — OXYCODONE HYDROCHLORIDE AND ACETAMINOPHEN 1000 MG: 500 TABLET ORAL at 10:29

## 2025-06-17 RX ADMIN — ALPRAZOLAM 0.25 MG: 0.25 TABLET ORAL at 23:29

## 2025-06-17 RX ADMIN — SODIUM CHLORIDE, PRESERVATIVE FREE 10 ML: 5 INJECTION INTRAVENOUS at 21:07

## 2025-06-17 RX ADMIN — CLOPIDOGREL BISULFATE 75 MG: 75 TABLET, FILM COATED ORAL at 08:51

## 2025-06-17 RX ADMIN — Medication 400 UNITS: at 10:29

## 2025-06-17 RX ADMIN — POTASSIUM CHLORIDE 40 MEQ: 1500 TABLET, EXTENDED RELEASE ORAL at 08:51

## 2025-06-17 RX ADMIN — ATORVASTATIN CALCIUM 80 MG: 80 TABLET, FILM COATED ORAL at 18:11

## 2025-06-17 NOTE — PLAN OF CARE
Problem: Discharge Planning  Goal: Discharge to home or other facility with appropriate resources  6/17/2025 0235 by Sejal Tejeda RN  Outcome: Progressing  Flowsheets (Taken 6/17/2025 0235)  Discharge to home or other facility with appropriate resources: Identify barriers to discharge with patient and caregiver  Note: Planning to discharge home with daughter.     Problem: Pain  Goal: Verbalizes/displays adequate comfort level or baseline comfort level  6/17/2025 0235 by Sejal Tejeda RN  Outcome: Progressing  Flowsheets (Taken 6/17/2025 0235)  Verbalizes/displays adequate comfort level or baseline comfort level:   Encourage patient to monitor pain and request assistance   Assess pain using appropriate pain scale   Administer analgesics based on type and severity of pain and evaluate response   Implement non-pharmacological measures as appropriate and evaluate response  Note: Ongoing assessment & interventions provided throughout shift.  Reminded patient to report any pain, pressure, or shortness of breath to the nurse.     Problem: Safety - Adult  Goal: Free from fall injury  6/17/2025 0235 by Sejal Tejeda RN  Outcome: Progressing  Flowsheets (Taken 6/17/2025 0235)  Free From Fall Injury: Instruct family/caregiver on patient safety  Note: Bed locked & in low position, call light in reach, side-rails up x2, bed/chair alarm utilized, non-slip socks on when ambulating, reminded patient to use call light to call for assistance.       Problem: ABCDS Injury Assessment  Goal: Absence of physical injury  6/17/2025 0235 by Sejal Tejeda RN  Outcome: Progressing  Flowsheets (Taken 6/17/2025 0235)  Absence of Physical Injury: Implement safety measures based on patient assessment     Problem: Cardiovascular - Adult  Goal: Maintains optimal cardiac output and hemodynamic stability  6/17/2025 0235 by Sejal Tejeda RN  Outcome: Progressing  Flowsheets (Taken 6/17/2025 0235)  Maintains optimal cardiac output and

## 2025-06-17 NOTE — PROGRESS NOTES
Structural Heart/Cardiology Progress Note    6/17/2025 8:29 AM    Procedure:  TAVR        POD #:  7    Subjective:      6/17  Stable over night  Did not cardiovert yesterday as pt had converted back into sinus.   Maintained for a couple Hrs then went in and out of aflutter - rate controlled  Remains on PO cardizem  EKG stable  Increased lasix to 40mg PO BID - good response  UOP 1.8L (NET is inaccurate)   BNP up since admission         6/16  Ok overnight - had a brief episode of hypoxia - noted upper respiratory congestion w/ clearance via cough and improvement of oxygenation - put on 2L NC, saturation now at 97%  Cardizem bolus w/ gtt started yesterday - now rate controlled in the 80s, aflutter  BP stable  Diuresed yesterday w/ 2mg IV bumex - UOP 1.6L  Ordering BNP this morning  Pt notes some improvement of her chest pressure/SOB but still feels winded w/ conversation.     6/15  Stable over night  Converted back into afib RVR this am around 10- rate 130s-140s  Symptomatic - SOB/chest pressure  RA - oxygen saturation at 96%    Ordered chest xray showing continued bilateral pleural effusions - did urinate more w/ additional lasix yesterday evening  Ordered 2mg bumex IV push this morning as well  Denies chest pain/bloating/or leg swelling  CBC stable  Eliquis restarted yesterday   Dressing in placed over pericardial drain site - was not changed yesterday        6/14  Resting in bed - sitting up  No complaints this morning  No further episodes of high grade block/asystole   Converted out of afib - In NSR in the 70s  Pericardial drain is out - dressing is saturated w/ serous fluid  Denies chest pain/pain at drain site  ECHO this morning for re-evaluation   S/p 1 unit of  PRBC - HGB 9.1 post transfusion - will obtain CBC this morning as well  Remains on plavix/ASA  Chest xray showing bilateral pleural effusions, on NC at 4L, weaning down. On Lasix at home, BNP up this morning, known volume resuscitation, will

## 2025-06-17 NOTE — PROCEDURES
PROCEDURE NOTE  Date: 6/17/2025   Name: Marlene Ray  YOB: 1940    Procedures        EKG was completed and handed to RN

## 2025-06-17 NOTE — PROGRESS NOTES
[facility]  INPATIENT OCCUPATIONAL THERAPY  STRZ CCU-STEPDOWN 3B  EVALUATION      Discharge Recommendations: Home with assist PRN  Equipment Recommendations: No        Time In: 0934  Time Out: 1038  Timed Code Treatment Minutes: 54 Minutes  Minutes: 64          Date: 2025  Patient Name: Marlene Ray,   Gender: female      MRN: [id number]  : 1940  (85 y.o.)  Referring Practitioner: Elkin Yarbrough MD  Diagnosis: Severe aortic stenosis  Additional Pertinent Hx: Per EMR: 86 yo F w/ PMH: severe symptomatic aortic stenosis s/p TAVR, paroxysmal atrial fibrillation, CAD, hypertension, tobacco abuse. TAVR completed by Dr Parra on 6/10.  Patient was planned for discharge and transvenous pacer was removed.  She had become hypotensive and unresponsive after removal of TVP and a rapid response was called.  Stat echo showed pericardial effusion causing right ventricular pain.  Patient was urgently taken to the Cath Lab.  Echo  showed EF 55-60%, well-seated bioprosthetic aortic valve and small pericardial effusion present.  Patient underwent pericardiocentesis with Dr. Rodriguez 800 cc fluid removed. Code blue was called on patient on . Prior to my arrival patient was alert and was vomiting.  Per nursing report, patient was in A-fib RVR and she went into asystole for approximately 30 seconds and had 5 compressions in her chest prior to her waking up.  Patient had a vomitus episode and stated that she felt better after approximately 30 minutes.  Her heart rate was noted to be in the 40s-50s.  CODE BLUE was not called overhead or via PS and hence we did not have EKG on the scene in order to capture rhythm.  Based on telemetry patient went back into A-fib with a heart rate in the 110s to 120s. EKG captured showed Afib with PVC with HR 68.    Restrictions/Precautions:  Restrictions/Precautions: General Precautions, Fall Risk  Position Activity Restriction  Other Position/Activity Restrictions:

## 2025-06-17 NOTE — PLAN OF CARE
Problem: Discharge Planning  Goal: Discharge to home or other facility with appropriate resources  6/17/2025 1206 by Cherelle Robbins RN  Outcome: Progressing  Flowsheets  Taken 6/17/2025 1206  Discharge to home or other facility with appropriate resources:   Identify barriers to discharge with patient and caregiver   Arrange for needed discharge resources and transportation as appropriate  Taken 6/17/2025 0845  Discharge to home or other facility with appropriate resources: Identify barriers to discharge with patient and caregiver  6/17/2025 0235 by Sejal Tejeda RN  Outcome: Progressing  Flowsheets (Taken 6/17/2025 0235)  Discharge to home or other facility with appropriate resources: Identify barriers to discharge with patient and caregiver  Note: Planning to discharge home with daughter.     Problem: Pain  Goal: Verbalizes/displays adequate comfort level or baseline comfort level  6/17/2025 1206 by Cherelle Robbins RN  Outcome: Progressing  Flowsheets (Taken 6/17/2025 1206)  Verbalizes/displays adequate comfort level or baseline comfort level: Encourage patient to monitor pain and request assistance  6/17/2025 0235 by Sejal Tejeda RN  Outcome: Progressing  Flowsheets (Taken 6/17/2025 0235)  Verbalizes/displays adequate comfort level or baseline comfort level:   Encourage patient to monitor pain and request assistance   Assess pain using appropriate pain scale   Administer analgesics based on type and severity of pain and evaluate response   Implement non-pharmacological measures as appropriate and evaluate response  Note: Ongoing assessment & interventions provided throughout shift.  Reminded patient to report any pain, pressure, or shortness of breath to the nurse.     Problem: Safety - Adult  Goal: Free from fall injury  6/17/2025 1206 by Cherelle Robbins RN  Outcome: Progressing  Flowsheets (Taken 6/17/2025 1206)  Free From Fall Injury: Instruct family/caregiver on patient safety  6/17/2025 0235 by  RN  Outcome: Progressing  Flowsheets (Taken 6/17/2025 0235)  Skin Integrity Remains Intact: Monitor for areas of redness and/or skin breakdown  Note: Patient turns self     Problem: Nutrition Deficit:  Goal: Optimize nutritional status  6/17/2025 1206 by Cherelle Robbins, RN  Outcome: Progressing  Flowsheets (Taken 6/17/2025 1206)  Nutrient intake appropriate for improving, restoring, or maintaining nutritional needs: Assess nutritional status and recommend course of action  6/17/2025 0235 by Sejal Tejeda, RN  Outcome: Progressing  Flowsheets (Taken 6/17/2025 0235)  Nutrient intake appropriate for improving, restoring, or maintaining nutritional needs: Assess nutritional status and recommend course of action

## 2025-06-18 ENCOUNTER — APPOINTMENT (OUTPATIENT)
Age: 85
DRG: 267 | End: 2025-06-18
Attending: NURSE PRACTITIONER
Payer: MEDICARE

## 2025-06-18 ENCOUNTER — TELEPHONE (OUTPATIENT)
Dept: CARDIOLOGY CLINIC | Age: 85
End: 2025-06-18

## 2025-06-18 VITALS
TEMPERATURE: 98.2 F | OXYGEN SATURATION: 95 % | HEART RATE: 82 BPM | WEIGHT: 132 LBS | SYSTOLIC BLOOD PRESSURE: 119 MMHG | RESPIRATION RATE: 18 BRPM | BODY MASS INDEX: 22.53 KG/M2 | DIASTOLIC BLOOD PRESSURE: 54 MMHG | HEIGHT: 64 IN

## 2025-06-18 DIAGNOSIS — Z95.2 S/P TAVR (TRANSCATHETER AORTIC VALVE REPLACEMENT): Primary | ICD-10-CM

## 2025-06-18 LAB
ANION GAP SERPL CALC-SCNC: 12 MEQ/L (ref 8–16)
BUN SERPL-MCNC: 21 MG/DL (ref 8–23)
CALCIUM SERPL-MCNC: 9.5 MG/DL (ref 8.8–10.2)
CHLORIDE SERPL-SCNC: 99 MEQ/L (ref 98–111)
CO2 SERPL-SCNC: 26 MEQ/L (ref 22–29)
CREAT SERPL-MCNC: 0.8 MG/DL (ref 0.5–0.9)
ECHO BSA: 1.63 M2
EKG ATRIAL RATE: 83 BPM
EKG P AXIS: 38 DEGREES
EKG P-R INTERVAL: 176 MS
EKG Q-T INTERVAL: 392 MS
EKG QRS DURATION: 130 MS
EKG QTC CALCULATION (BAZETT): 460 MS
EKG R AXIS: -17 DEGREES
EKG T AXIS: 116 DEGREES
EKG VENTRICULAR RATE: 83 BPM
GFR SERPL CREATININE-BSD FRML MDRD: 72 ML/MIN/1.73M2
GLUCOSE SERPL-MCNC: 107 MG/DL (ref 74–109)
MAGNESIUM SERPL-MCNC: 1.7 MG/DL (ref 1.6–2.6)
NT-PROBNP SERPL IA-MCNC: 2227 PG/ML (ref 0–449)
POTASSIUM SERPL-SCNC: 4 MEQ/L (ref 3.5–5.2)
SODIUM SERPL-SCNC: 137 MEQ/L (ref 135–145)

## 2025-06-18 PROCEDURE — 93005 ELECTROCARDIOGRAM TRACING: CPT | Performed by: INTERNAL MEDICINE

## 2025-06-18 PROCEDURE — 83880 ASSAY OF NATRIURETIC PEPTIDE: CPT

## 2025-06-18 PROCEDURE — 80048 BASIC METABOLIC PNL TOTAL CA: CPT

## 2025-06-18 PROCEDURE — 83735 ASSAY OF MAGNESIUM: CPT

## 2025-06-18 PROCEDURE — 6370000000 HC RX 637 (ALT 250 FOR IP): Performed by: NURSE PRACTITIONER

## 2025-06-18 PROCEDURE — 36415 COLL VENOUS BLD VENIPUNCTURE: CPT

## 2025-06-18 PROCEDURE — 93270 REMOTE 30 DAY ECG REV/REPORT: CPT

## 2025-06-18 PROCEDURE — 93010 ELECTROCARDIOGRAM REPORT: CPT | Performed by: INTERNAL MEDICINE

## 2025-06-18 PROCEDURE — 99239 HOSP IP/OBS DSCHRG MGMT >30: CPT | Performed by: NURSE PRACTITIONER

## 2025-06-18 PROCEDURE — 6370000000 HC RX 637 (ALT 250 FOR IP): Performed by: INTERNAL MEDICINE

## 2025-06-18 PROCEDURE — 2500000003 HC RX 250 WO HCPCS: Performed by: INTERNAL MEDICINE

## 2025-06-18 RX ORDER — FUROSEMIDE 40 MG/1
40 TABLET ORAL 2 TIMES DAILY
Qty: 60 TABLET | Refills: 3 | Status: SHIPPED | OUTPATIENT
Start: 2025-06-18

## 2025-06-18 RX ORDER — DILTIAZEM HYDROCHLORIDE 120 MG/1
120 CAPSULE, COATED, EXTENDED RELEASE ORAL DAILY
Qty: 30 CAPSULE | Refills: 3 | Status: SHIPPED | OUTPATIENT
Start: 2025-06-19

## 2025-06-18 RX ORDER — PANTOPRAZOLE SODIUM 40 MG/1
40 TABLET, DELAYED RELEASE ORAL
Qty: 30 TABLET | Refills: 3 | Status: SHIPPED | OUTPATIENT
Start: 2025-06-19

## 2025-06-18 RX ORDER — POTASSIUM CHLORIDE 1500 MG/1
20 TABLET, EXTENDED RELEASE ORAL 2 TIMES DAILY WITH MEALS
Status: DISCONTINUED | OUTPATIENT
Start: 2025-06-18 | End: 2025-06-18 | Stop reason: HOSPADM

## 2025-06-18 RX ORDER — COLCHICINE 0.6 MG/1
0.3 TABLET ORAL EVERY OTHER DAY
Qty: 30 TABLET | Refills: 3 | Status: SHIPPED | OUTPATIENT
Start: 2025-06-20

## 2025-06-18 RX ORDER — FUROSEMIDE 40 MG/1
40 TABLET ORAL 2 TIMES DAILY
Status: DISCONTINUED | OUTPATIENT
Start: 2025-06-18 | End: 2025-06-18 | Stop reason: HOSPADM

## 2025-06-18 RX ORDER — POTASSIUM CHLORIDE 1500 MG/1
20 TABLET, EXTENDED RELEASE ORAL 2 TIMES DAILY WITH MEALS
Qty: 60 TABLET | Refills: 3 | Status: SHIPPED | OUTPATIENT
Start: 2025-06-18

## 2025-06-18 RX ADMIN — Medication 400 UNITS: at 08:13

## 2025-06-18 RX ADMIN — DILTIAZEM HYDROCHLORIDE 120 MG: 120 CAPSULE, EXTENDED RELEASE ORAL at 08:08

## 2025-06-18 RX ADMIN — PANTOPRAZOLE SODIUM 40 MG: 40 TABLET, DELAYED RELEASE ORAL at 08:08

## 2025-06-18 RX ADMIN — CLOPIDOGREL BISULFATE 75 MG: 75 TABLET, FILM COATED ORAL at 08:08

## 2025-06-18 RX ADMIN — OXYCODONE HYDROCHLORIDE AND ACETAMINOPHEN 1000 MG: 500 TABLET ORAL at 08:08

## 2025-06-18 RX ADMIN — FUROSEMIDE 40 MG: 40 TABLET ORAL at 10:16

## 2025-06-18 RX ADMIN — SODIUM CHLORIDE, PRESERVATIVE FREE 10 ML: 5 INJECTION INTRAVENOUS at 08:13

## 2025-06-18 RX ADMIN — FERROUS SULFATE TAB 325 MG (65 MG ELEMENTAL FE) 325 MG: 325 (65 FE) TAB at 08:08

## 2025-06-18 RX ADMIN — ACETAMINOPHEN 650 MG: 325 TABLET ORAL at 12:28

## 2025-06-18 RX ADMIN — POTASSIUM CHLORIDE 20 MEQ: 1500 TABLET, EXTENDED RELEASE ORAL at 10:16

## 2025-06-18 RX ADMIN — COLCHICINE 0.3 MG: 0.6 TABLET, FILM COATED ORAL at 08:08

## 2025-06-18 RX ADMIN — APIXABAN 5 MG: 5 TABLET, FILM COATED ORAL at 08:08

## 2025-06-18 ASSESSMENT — PAIN DESCRIPTION - ORIENTATION: ORIENTATION: UPPER

## 2025-06-18 ASSESSMENT — PAIN - FUNCTIONAL ASSESSMENT: PAIN_FUNCTIONAL_ASSESSMENT: ACTIVITIES ARE NOT PREVENTED

## 2025-06-18 ASSESSMENT — PAIN SCALES - GENERAL: PAINLEVEL_OUTOF10: 5

## 2025-06-18 ASSESSMENT — PAIN DESCRIPTION - DESCRIPTORS: DESCRIPTORS: ACHING

## 2025-06-18 ASSESSMENT — PAIN DESCRIPTION - LOCATION: LOCATION: BACK

## 2025-06-18 NOTE — PLAN OF CARE
Problem: Discharge Planning  Goal: Discharge to home or other facility with appropriate resources  6/18/2025 1010 by Cherelle Robbins RN  Outcome: Completed  6/18/2025 0929 by Cherelle Robbins RN  Outcome: Progressing  Flowsheets  Taken 6/18/2025 0929  Discharge to home or other facility with appropriate resources:   Identify barriers to discharge with patient and caregiver   Arrange for needed discharge resources and transportation as appropriate  Taken 6/18/2025 0730  Discharge to home or other facility with appropriate resources: Identify barriers to discharge with patient and caregiver  6/18/2025 0142 by Sejal Tejeda RN  Outcome: Progressing  Flowsheets (Taken 6/18/2025 0142)  Discharge to home or other facility with appropriate resources: Identify barriers to discharge with patient and caregiver  Note: Planning to discharge home with daughter.     Problem: Pain  Goal: Verbalizes/displays adequate comfort level or baseline comfort level  6/18/2025 1010 by Cherelle Robbins RN  Outcome: Completed  6/18/2025 0929 by Cherelle Robbins RN  Outcome: Progressing  Flowsheets (Taken 6/18/2025 0929)  Verbalizes/displays adequate comfort level or baseline comfort level:   Encourage patient to monitor pain and request assistance   Assess pain using appropriate pain scale  6/18/2025 0142 by Sejal Tejeda RN  Outcome: Progressing  Flowsheets (Taken 6/18/2025 0142)  Verbalizes/displays adequate comfort level or baseline comfort level:   Encourage patient to monitor pain and request assistance   Assess pain using appropriate pain scale   Administer analgesics based on type and severity of pain and evaluate response   Implement non-pharmacological measures as appropriate and evaluate response  Note: Ongoing assessment & interventions provided throughout shift.  Reminded patient to report any pain, pressure, or shortness of breath to the nurse.  Pain medications provided per physician's orders.       Problem: Safety -

## 2025-06-18 NOTE — DISCHARGE INSTRUCTIONS
Post Transcatheter Aortic Valve Replacement (TAVR) Discharge Instructions    Your follow-up appointment and echocardiogram has been scheduled by Dr. Rodriguez's office.  If any changes need to be made, please call the office to do so.    We are here for you! If you have any questions, please call:   Lala LIZN, [phone]    Do you have the help you need at home? Someone must be with you for the first 5-7 days or until you are seen in the office post TAVR.    Take the Lasix 40 mg twice a day on Thursday, Friday and Saturday. On Sunday June 22 decrease the dose to 40 mg Daily.     Take the potassium as 20 meq twice a day on the days you take the Lasix twice a day.  On Sunday 6/22 decrease the Lasix to just once a day.     Resume your home dose magnesium supplement.     Take all other medications as prescribed.     Have the labs done on Wednesday June 25 as ordered.     Follow-up in office on Friday June 23,2025 as scheduled with XOCHILT Owen CNP    Call the office with any questions or concerns prior to that appointment.       ACTIVITY:  Weigh yourself every day in the morning after using the bathroom.    NO DRIVING UNTIL YOU RETURN TO THE DOCTOR'S OFFICE.  You may ride in a car.   Do not lift more than five to ten pounds for the first week (A gallon of milk is 7 lbs)  Get up and get dressed every day. Do not stay in bed. Set a daily routine. This is an important step in re-gaining your strength.  You may walk up and down a few stairs.   Walk as much as you can.  This will help facilitate the healing process.  Plan rest periods during the day.  Deep breathing is important.   Avoid work that increases muscle tension (straining with bowel movements, moving furniture, etc.)      WOUND CARE:  Remove Band-Aids after 48 hours of placement.  May shower once Band-Aids are removed. No bathtubs, pools, or hot tubs for the first week you are home.   Cleanse wounds with Hibiclens soap and water. Pat

## 2025-06-18 NOTE — TELEPHONE ENCOUNTER
Elkin [facility] pharmacist left [phone]  Medication clarification     Ok to take diltiazem with colchicine?

## 2025-06-18 NOTE — PROGRESS NOTES
Discussed discharge summary with patient. Instructed patient about medications & follow up appointments including CHF appointment on Friday June 20th and labs around June 25th. Patient denies any additional questions. Patient was discharged with all belongings. No distress noted. Cardiac holter monitor on at discharge.Patient discharged to home. Taken down to the vehicle by Patient care tech per wheelchair.

## 2025-06-18 NOTE — PLAN OF CARE
Problem: Discharge Planning  Goal: Discharge to home or other facility with appropriate resources  6/18/2025 0929 by Cherelle Robbins, RN  Outcome: Progressing  Flowsheets  Taken 6/18/2025 0929  Discharge to home or other facility with appropriate resources:   Identify barriers to discharge with patient and caregiver   Arrange for needed discharge resources and transportation as appropriate  Taken 6/18/2025 0730  Discharge to home or other facility with appropriate resources: Identify barriers to discharge with patient and caregiver  6/18/2025 0142 by Sejal Tejeda RN  Outcome: Progressing  Flowsheets (Taken 6/18/2025 0142)  Discharge to home or other facility with appropriate resources: Identify barriers to discharge with patient and caregiver  Note: Planning to discharge home with daughter.     Problem: Pain  Goal: Verbalizes/displays adequate comfort level or baseline comfort level  6/18/2025 0929 by Cherelle Robbins RN  Outcome: Progressing  Flowsheets (Taken 6/18/2025 0929)  Verbalizes/displays adequate comfort level or baseline comfort level:   Encourage patient to monitor pain and request assistance   Assess pain using appropriate pain scale  6/18/2025 0142 by Sejal Tejeda RN  Outcome: Progressing  Flowsheets (Taken 6/18/2025 0142)  Verbalizes/displays adequate comfort level or baseline comfort level:   Encourage patient to monitor pain and request assistance   Assess pain using appropriate pain scale   Administer analgesics based on type and severity of pain and evaluate response   Implement non-pharmacological measures as appropriate and evaluate response  Note: Ongoing assessment & interventions provided throughout shift.  Reminded patient to report any pain, pressure, or shortness of breath to the nurse.  Pain medications provided per physician's orders.       Problem: Safety - Adult  Goal: Free from fall injury  6/18/2025 0929 by Cherelle Robbins, RN  Outcome: Progressing  Flowsheets (Taken  6/18/2025 0929)  Free From Fall Injury: Instruct family/caregiver on patient safety  6/18/2025 0142 by Sejal Tejeda RN  Outcome: Progressing  Flowsheets (Taken 6/18/2025 0142)  Free From Fall Injury: Instruct family/caregiver on patient safety  Note: Bed locked & in low position, call light in reach, side-rails up x2, bed/chair alarm utilized, non-slip socks on when ambulating, reminded patient to use call light to call for assistance.       Problem: ABCDS Injury Assessment  Goal: Absence of physical injury  6/18/2025 0929 by Cherelle Robbins RN  Outcome: Progressing  Flowsheets (Taken 6/18/2025 0929)  Absence of Physical Injury: Implement safety measures based on patient assessment  6/18/2025 0142 by Sejal Tejeda RN  Outcome: Progressing  Flowsheets (Taken 6/18/2025 0142)  Absence of Physical Injury: Implement safety measures based on patient assessment     Problem: Cardiovascular - Adult  Goal: Maintains optimal cardiac output and hemodynamic stability  6/18/2025 0929 by Cherelle Robbins RN  Outcome: Progressing  Flowsheets (Taken 6/18/2025 0929)  Maintains optimal cardiac output and hemodynamic stability:   Monitor blood pressure and heart rate   Monitor urine output and notify Licensed Independent Practitioner for values outside of normal range  6/18/2025 0142 by Sejal Tejeda RN  Outcome: Progressing  Flowsheets (Taken 6/18/2025 0142)  Maintains optimal cardiac output and hemodynamic stability:   Monitor blood pressure and heart rate   Assess for signs of decreased cardiac output  Note: Ongoing assessment & interventions provided throughout shift.  Patient on continuous telemetry monitoring, heart tones, vitals & pulses checked at least 3 times per shift & PRN. Vitals within acceptable limits.  Peripheral pulses palpable.    Goal: Absence of cardiac dysrhythmias or at baseline  6/18/2025 0929 by Cherelle Robbins RN  Outcome: Progressing  Flowsheets (Taken 6/18/2025 0929)  Absence of cardiac dysrhythmias

## 2025-06-18 NOTE — DISCHARGE SUMMARY
Structural Heart/Cardiology Discharge Summary       Name:  Marlene Ray  YOB: 1940  Medical Record Number:  [id number]    Date of Admission:  6/10/2025  Date of Discharge:  6/18/2025    Admitting physician: Raffi Rodriguez MD  Discharge to: Home  Condition at d/c: Stable    Time spent on discharge: >60 minutes    Hospital Problem List:  Patient Active Problem List   Diagnosis    Severe aortic stenosis    Status post angioplasty with stent    CAD (coronary artery disease)    Carotid artery stenosis    Carotid stenosis, asymptomatic, bilateral    Pulmonary insufficiency    Chest pain    Iliac artery dissection    S/P TAVR (transcatheter aortic valve replacement)    Cardiac tamponade    Pericardial effusion       Procedures: TAVR 6/10/2025    Hospital Course: Underwent successful TAVR on 6/10/2025.  Hgb and renal function remained stable post procedure.  Groin sites without bleeding or hematoma. Tolerating diet, meds, ambulation in lewis and was voiding without issue.  No rhythm issues post-procedure. TVPM removed per Dr. Rodriguez without issue. Planned for discharge to home. Shortly after TVPM removal  pt became hypotensive and unresponsive. Rapid was called along with STAT ECHO showing pericardial effusion causing RV collapse. Pt taken to cath lab for pericardial drain insertion and transferred to ICU post procedure. 200 cc initially removed. Additional 115 cc OVN and 15 cc next AM. She remained HD stable. Drain continued but clamped with q8h fluid aspiration attempts. ASA and Plavix on hold. Back pain during night and some anterior chest discomfort. CRP and ESR obtained - both elevated. EKG with ST elevations consistent with pericarditis and not STEMI. Afebrile with WBC 13.6, ESR 38, CRP 8.95. Colchicine started.   TRO to ICU SD 3A evening of 6/12. Remained stable OVN into 6/13. No additional drain output. No chest discomfort - some upper back soreness. Plavix and ASA restarted. Throughout day was up  in chair and room to . No additional drain output by 6/13 evening and repeat TTE noted no significant pericardial fluid. Drain removed - moderate amount pink-tinged serous fluid from drain tract after removal - no bleeding. Dressing to site. Patient anxious, hypertensive with process and developed atrial fib with RVR.   Amiodarone bolus and 1 mg/min infusion began. Received 20 mg IV lasix for mild crackles with elevated BP and RVR; and Morphine IV for pain control. Hgb was 7.5 and she had been mildly WHITNEY with ambulation and mildly tachycardic. 1 unit PRBCs given. Received Toradol for additional pain control. 12.5 mg Metoprolol also administered for rate control with rate slowing into 90s from 130 - 140. Remained HDS.   ~ 0130 6/14/2025 abruptly developed 30 sec asystole. Received compressions x 5 per nursing staff and patient awoke promptly - complained of nausea. Amiodarone infusion stopped. No further asystole but intermittent 2 - 3 second atrial fib pauses alternating with SR with long first degree AVB to few beats CHB and then returning to afib. Several episodes but remained HDS. Returned to ICU for close monitoring. Episodes abated without further incident. No pacing or Atropine required. Converted out of afib to SR. HDS. No chest pain and repeat echo obtained. Hgb 9.1 post transfusion. CxR with bilateral pleural effusions and elevation in BNP. Diuresis with IV lasix with improvement. Eliquis restarted.   6/15 returned to afib with  - 140s - sx with SOB and chest pressure. IV cardizem bolus and infusion. Sat 96% RA; /. CxR with continued bilateral pleural effusions and 2 mg IV Bumex given. H &H stable. Remained stable OVN with rhythm aflutter with VR 80s in AM 6/16. Had diuresed 1.6 L with Bumex. Improvement in SOB and chest discomfort. 6/17 Converted back into SR - no DC-CV was needed. Then in and out of aflutter with CVR. PO Cardizem continued. Diuresis with Lasix 40 mg po BID with improvement.

## 2025-06-18 NOTE — PROCEDURES
PROCEDURE NOTE  Date: 6/18/2025   Name: Marlene Ray  YOB: 1940    Procedures  EKG Completed. Handed to RN.

## 2025-06-18 NOTE — CARE COORDINATION
6/18/25, 2:01 PM EDT    Patient goals/plan/ treatment preferences discussed by  and .  Patient goals/plan/ treatment preferences reviewed with patient/ family.  Patient/ family verbalize understanding of discharge plan and are in agreement with goal/plan/treatment preferences.  Understanding was demonstrated using the teach back method.  AVS provided by RN at time of discharge, which includes all necessary medical information pertaining to the patients current course of illness, treatment, post-discharge goals of care, and treatment preferences.     Services At/After Discharge: DME and Outpatient    Discharging home with planned Cardiac Rehab and with 30 day event monitor.     Electronically signed by Hollie Huddleston RN on 6/18/2025 at 2:02 PM

## 2025-06-18 NOTE — PLAN OF CARE
Problem: Discharge Planning  Goal: Discharge to home or other facility with appropriate resources  6/18/2025 0142 by Sejal Tejeda RN  Outcome: Progressing  Flowsheets (Taken 6/18/2025 0142)  Discharge to home or other facility with appropriate resources: Identify barriers to discharge with patient and caregiver  Note: Planning to discharge home with daughter.     Problem: Pain  Goal: Verbalizes/displays adequate comfort level or baseline comfort level  6/18/2025 0142 by Sejal Tejeda RN  Outcome: Progressing  Flowsheets (Taken 6/18/2025 0142)  Verbalizes/displays adequate comfort level or baseline comfort level:   Encourage patient to monitor pain and request assistance   Assess pain using appropriate pain scale   Administer analgesics based on type and severity of pain and evaluate response   Implement non-pharmacological measures as appropriate and evaluate response  Note: Ongoing assessment & interventions provided throughout shift.  Reminded patient to report any pain, pressure, or shortness of breath to the nurse.  Pain medications provided per physician's orders.       Problem: Safety - Adult  Goal: Free from fall injury  6/18/2025 0142 by Sejal Tejeda RN  Outcome: Progressing  Flowsheets (Taken 6/18/2025 0142)  Free From Fall Injury: Instruct family/caregiver on patient safety  Note: Bed locked & in low position, call light in reach, side-rails up x2, bed/chair alarm utilized, non-slip socks on when ambulating, reminded patient to use call light to call for assistance.       Problem: ABCDS Injury Assessment  Goal: Absence of physical injury  6/18/2025 0142 by Sejal Tejeda RN  Outcome: Progressing  Flowsheets (Taken 6/18/2025 0142)  Absence of Physical Injury: Implement safety measures based on patient assessment     Problem: Cardiovascular - Adult  Goal: Maintains optimal cardiac output and hemodynamic stability  6/18/2025 0142 by Sejal Tejeda RN  Outcome: Progressing  Flowsheets (Taken 6/18/2025

## 2025-06-18 NOTE — TELEPHONE ENCOUNTER
Orders received from Dr. Rodriguez to schedule the patient for a 1 year post TAVR follow up appointment with an ECHO, CBC, and BMP.    ECHO scheduled for 6/10/25 at ___.  Appointment is scheduled with Jacquelyn for 6/16/25 at 0930.    Dr. Rodriguez, please agree.     [place], can you please schedule this ECHO?

## 2025-06-19 NOTE — TELEPHONE ENCOUNTER
SHIRLEY Covarrubias  Ok to take diltiazem and colchicine  Colchicine dose was decreased     Spoke with Pharmacist Britt  Notified ok to give diltiazem    DTR notified to go  med

## 2025-06-20 ENCOUNTER — OFFICE VISIT (OUTPATIENT)
Dept: CARDIOLOGY CLINIC | Age: 85
End: 2025-06-20
Payer: MEDICARE

## 2025-06-20 ENCOUNTER — TELEPHONE (OUTPATIENT)
Dept: CARDIOLOGY CLINIC | Age: 85
End: 2025-06-20

## 2025-06-20 VITALS
SYSTOLIC BLOOD PRESSURE: 100 MMHG | HEIGHT: 64 IN | WEIGHT: 133.8 LBS | OXYGEN SATURATION: 96 % | HEART RATE: 92 BPM | DIASTOLIC BLOOD PRESSURE: 50 MMHG | BODY MASS INDEX: 22.84 KG/M2

## 2025-06-20 DIAGNOSIS — I77.72 ILIAC ARTERY DISSECTION: ICD-10-CM

## 2025-06-20 DIAGNOSIS — I50.32 CHRONIC HEART FAILURE WITH PRESERVED EJECTION FRACTION (HCC): ICD-10-CM

## 2025-06-20 DIAGNOSIS — Z95.820 STATUS POST ANGIOPLASTY WITH STENT: ICD-10-CM

## 2025-06-20 DIAGNOSIS — I25.10 CORONARY ARTERY DISEASE INVOLVING NATIVE CORONARY ARTERY OF NATIVE HEART WITHOUT ANGINA PECTORIS: ICD-10-CM

## 2025-06-20 DIAGNOSIS — Z95.2 S/P TAVR (TRANSCATHETER AORTIC VALVE REPLACEMENT): Primary | ICD-10-CM

## 2025-06-20 DIAGNOSIS — I48.0 PAROXYSMAL ATRIAL FIBRILLATION (HCC): ICD-10-CM

## 2025-06-20 DIAGNOSIS — I35.0 SEVERE AORTIC STENOSIS: ICD-10-CM

## 2025-06-20 PROCEDURE — 1159F MED LIST DOCD IN RCRD: CPT | Performed by: NURSE PRACTITIONER

## 2025-06-20 PROCEDURE — 99214 OFFICE O/P EST MOD 30 MIN: CPT | Performed by: NURSE PRACTITIONER

## 2025-06-20 PROCEDURE — 93000 ELECTROCARDIOGRAM COMPLETE: CPT | Performed by: NURSE PRACTITIONER

## 2025-06-20 PROCEDURE — 1123F ACP DISCUSS/DSCN MKR DOCD: CPT | Performed by: NURSE PRACTITIONER

## 2025-06-20 RX ORDER — METOPROLOL SUCCINATE 25 MG/1
12.5 TABLET, EXTENDED RELEASE ORAL DAILY
Qty: 15 TABLET | Refills: 3 | Status: SHIPPED | OUTPATIENT
Start: 2025-06-20

## 2025-06-20 NOTE — PROGRESS NOTES
Marlene Ray (:  1940) is a 85 y.o. female, Established patient, here for 7 day post TAVR evaluation.       Follow-Up from Hospital (7 day TAVR follow up)      HPI:   TAVR on 6/10/2025 with complicated hospital course. Per discharge summary:   Underwent successful TAVR on 6/10/2025.  Hgb and renal function remained stable post procedure.  Groin sites without bleeding or hematoma. Tolerating diet, meds, ambulation in lewis and was voiding without issue.  No rhythm issues post-procedure. TVPM removed per Dr. Rodriguez without issue. Planned for discharge to home. Shortly after TVPM removal  pt became hypotensive and unresponsive. Rapid was called along with STAT ECHO showing pericardial effusion causing RV collapse. Pt taken to cath lab for pericardial drain insertion and transferred to ICU post procedure. 200 cc initially removed. Additional 115 cc OVN and 15 cc next AM. She remained HD stable. Drain continued but clamped with q8h fluid aspiration attempts. ASA and Plavix on hold. Back pain during night and some anterior chest discomfort. CRP and ESR obtained - both elevated. EKG with ST elevations consistent with pericarditis and not STEMI. Afebrile with WBC 13.6, ESR 38, CRP 8.95. Colchicine started.   TRO to ICU SD 3A evening of . Remained stable OVN into . No additional drain output. No chest discomfort - some upper back soreness. Plavix and ASA restarted. Throughout day was up in chair and room to BR. No additional drain output by  evening and repeat TTE noted no significant pericardial fluid. Drain removed - moderate amount pink-tinged serous fluid from drain tract after removal - no bleeding. Dressing to site. Patient anxious, hypertensive with process and developed atrial fib with RVR.   Amiodarone bolus and 1 mg/min infusion began. Received 20 mg IV lasix for mild crackles with elevated BP and RVR; and Morphine IV for pain control. Hgb was 7.5 and she had been mildly WHITNEY with ambulation

## 2025-06-20 NOTE — PATIENT INSTRUCTIONS
Restart the Toprol XL at 12.5 mg daily (1/2 tablet).     Complete the event monitor.    Stay well hydrated.     Ease into your normal routine.     May begin to drive.     Continue other current medications as prescribed.    Continue the magnesium glycinate as taking at this time.     Eat heart healthy diet.     Follow-up with your PCP as scheduled.    Follow-up with XOCHILT Owen CNP on 7/15/2025 as scheduled or sooner if need.     Have the echo done as scheduled for the 30 day follow-up.

## 2025-06-23 ENCOUNTER — TELEPHONE (OUTPATIENT)
Dept: CARDIOLOGY CLINIC | Age: 85
End: 2025-06-23

## 2025-06-23 NOTE — TELEPHONE ENCOUNTER
Patient called in. Said she is just not doing good. Has no energy. Has to take pain pill d/t pain from the hospital, the compressions she had to go through and the fluid around her heart. Just still having pain.    No relief, feels so fatigued, just does not feel well. HR up and down. BP yest 111/??    Asking to talk to Jacquelyn or what she needs to do.   Said she expressed this while at office visit on Friday and still no better

## 2025-06-23 NOTE — TELEPHONE ENCOUNTER
Spoke with the daughter on the phone. She's calling wanting to discuss the blood pressure readings, she said she's been reading in the 110s for the systolic and unsure of the diastolic. She also states the her mom is been more lethargic than usual, unwilling to get out of bed and do daily activities. She said this was all addressed in the office visit on Friday but she hasn't seen any improvement. I did offer bringing the patient back in today or tomorrow for evaluation she declined at this time. I did provide some reassurance with the blood pressure and that seemed to help. Daughter states she will call back if she wants her to be evaluated but declined at this time

## 2025-06-24 ENCOUNTER — HOSPITAL ENCOUNTER (OUTPATIENT)
Age: 85
Discharge: HOME | End: 2025-06-24
Payer: MEDICARE

## 2025-06-24 DIAGNOSIS — Z79.899 ON POTASSIUM WASTING DIURETIC THERAPY: ICD-10-CM

## 2025-06-24 DIAGNOSIS — Z95.2 S/P TAVR (TRANSCATHETER AORTIC VALVE REPLACEMENT): ICD-10-CM

## 2025-06-24 LAB
ALBUMIN SERPL BCG-MCNC: 3.7 G/DL (ref 3.4–4.9)
ALP SERPL-CCNC: 97 U/L (ref 38–126)
ALT SERPL W/O P-5'-P-CCNC: 28 U/L (ref 10–35)
ANION GAP SERPL CALC-SCNC: 16 MEQ/L (ref 8–16)
AST SERPL-CCNC: 35 U/L (ref 10–35)
BILIRUB SERPL-MCNC: 0.7 MG/DL (ref 0.3–1.2)
BUN SERPL-MCNC: 40 MG/DL (ref 8–23)
CALCIUM SERPL-MCNC: 9.9 MG/DL (ref 8.8–10.2)
CHLORIDE SERPL-SCNC: 88 MEQ/L (ref 98–111)
CO2 SERPL-SCNC: 21 MEQ/L (ref 22–29)
CREAT SERPL-MCNC: 1.5 MG/DL (ref 0.5–0.9)
GFR SERPL CREATININE-BSD FRML MDRD: 34 ML/MIN/1.73M2
GLUCOSE SERPL-MCNC: 121 MG/DL (ref 74–109)
MAGNESIUM SERPL-MCNC: 2.4 MG/DL (ref 1.6–2.6)
NT-PROBNP SERPL IA-MCNC: 7206 PG/ML (ref 0–449)
POTASSIUM SERPL-SCNC: 5.3 MEQ/L (ref 3.5–5.2)
PROT SERPL-MCNC: 6.8 G/DL (ref 6.4–8.3)
SODIUM SERPL-SCNC: 125 MEQ/L (ref 135–145)

## 2025-06-24 PROCEDURE — 80053 COMPREHEN METABOLIC PANEL: CPT

## 2025-06-24 PROCEDURE — 83880 ASSAY OF NATRIURETIC PEPTIDE: CPT

## 2025-06-24 PROCEDURE — 83735 ASSAY OF MAGNESIUM: CPT

## 2025-06-24 NOTE — TELEPHONE ENCOUNTER
Starting to feel a little better. Will try to do more. If not feeling well within the next week she will call back

## 2025-06-25 ENCOUNTER — TELEPHONE (OUTPATIENT)
Dept: CARDIOLOGY CLINIC | Age: 85
End: 2025-06-25

## 2025-06-25 DIAGNOSIS — Z79.899 ON POTASSIUM WASTING DIURETIC THERAPY: ICD-10-CM

## 2025-06-25 DIAGNOSIS — Z95.2 S/P TAVR (TRANSCATHETER AORTIC VALVE REPLACEMENT): Primary | ICD-10-CM

## 2025-06-25 DIAGNOSIS — R06.02 SHORTNESS OF BREATH: ICD-10-CM

## 2025-06-25 NOTE — TELEPHONE ENCOUNTER
Left a detailed message with Dr Michael's office that Jacquelyn is fine with the medication changes Dr. Michael wants to make.    Per Jacquelyn's verbal order the medication changes below are okay for this patient.    Jacquelyn please agree.

## 2025-06-25 NOTE — TELEPHONE ENCOUNTER
Nurse Chiara from Dr Michael's office called stating patient was in for an appointment today and she is not feeling well, BP was 94/56 and has some crackles in the lungs, labs from yesterday show a decrease in kidney function and K+ has increased,    Dr. Michael is wanting to lower Lisinopril to 10 mg and Lasix to 20 mg and hold the K+.  He is ordering a chest x-ray but wants to know your thoughts on the medication changes.    Please advise.

## 2025-06-25 NOTE — TELEPHONE ENCOUNTER
S/p tavr  Can you review labs and lasix dose change??  Bnp elevated  Complaining of fatigue  Do you want to order f/u labs?

## 2025-06-25 NOTE — TELEPHONE ENCOUNTER
Talked to nela   She is drinking  Encouraged her to check blood pressures with med changes-   We can call her next week to get update  Daughter knows to call our office with any concerns   You want bnp, bmp, gfr?

## 2025-06-26 NOTE — TELEPHONE ENCOUNTER
Spoke to patient is not feeling better but does not want to come in but will possibly tomorrow wants a break from appts. Is ok with a call back tomorrow for update.

## 2025-06-26 NOTE — TELEPHONE ENCOUNTER
LM for pt to call office back. Spoke to Luzma , will call pt and call office back with update so appt can be made today if needed.

## 2025-06-27 NOTE — TELEPHONE ENCOUNTER
Yesterday  134/72-84  117/59-75  120/64-81    Today  122/68-82    Repeat bmp bnp next week- orders mailed  Patient notified

## 2025-07-02 ENCOUNTER — HOSPITAL ENCOUNTER (OUTPATIENT)
Age: 85
Discharge: HOME | End: 2025-07-02
Payer: MEDICARE

## 2025-07-02 DIAGNOSIS — Z79.899 ON POTASSIUM WASTING DIURETIC THERAPY: ICD-10-CM

## 2025-07-02 DIAGNOSIS — R06.02 SHORTNESS OF BREATH: ICD-10-CM

## 2025-07-02 DIAGNOSIS — Z95.2 S/P TAVR (TRANSCATHETER AORTIC VALVE REPLACEMENT): ICD-10-CM

## 2025-07-02 LAB
ANION GAP SERPL CALC-SCNC: 15 MEQ/L (ref 8–16)
BUN SERPL-MCNC: 15 MG/DL (ref 8–23)
CALCIUM SERPL-MCNC: 9.4 MG/DL (ref 8.8–10.2)
CHLORIDE SERPL-SCNC: 96 MEQ/L (ref 98–111)
CO2 SERPL-SCNC: 24 MEQ/L (ref 22–29)
CREAT SERPL-MCNC: 1 MG/DL (ref 0.5–0.9)
GFR SERPL CREATININE-BSD FRML MDRD: 55 ML/MIN/1.73M2
GLUCOSE SERPL-MCNC: 108 MG/DL (ref 74–109)
NT-PROBNP SERPL IA-MCNC: 2565 PG/ML (ref 0–449)
POTASSIUM SERPL-SCNC: 3.8 MEQ/L (ref 3.5–5.2)
SODIUM SERPL-SCNC: 135 MEQ/L (ref 135–145)

## 2025-07-02 PROCEDURE — 83880 ASSAY OF NATRIURETIC PEPTIDE: CPT

## 2025-07-02 PROCEDURE — 80048 BASIC METABOLIC PNL TOTAL CA: CPT

## 2025-07-03 ENCOUNTER — RESULTS FOLLOW-UP (OUTPATIENT)
Dept: CARDIOLOGY CLINIC | Age: 85
End: 2025-07-03

## 2025-07-08 DIAGNOSIS — R06.02 SHORTNESS OF BREATH: ICD-10-CM

## 2025-07-08 DIAGNOSIS — I50.32 CHRONIC HEART FAILURE WITH PRESERVED EJECTION FRACTION (HCC): Primary | ICD-10-CM

## 2025-07-08 DIAGNOSIS — Z95.2 S/P TAVR (TRANSCATHETER AORTIC VALVE REPLACEMENT): ICD-10-CM

## 2025-07-09 DIAGNOSIS — I50.32 CHRONIC HEART FAILURE WITH PRESERVED EJECTION FRACTION (HCC): ICD-10-CM

## 2025-07-09 DIAGNOSIS — Z95.2 S/P TAVR (TRANSCATHETER AORTIC VALVE REPLACEMENT): ICD-10-CM

## 2025-07-09 DIAGNOSIS — R06.02 SHORTNESS OF BREATH: ICD-10-CM

## 2025-07-15 ENCOUNTER — TELEPHONE (OUTPATIENT)
Dept: CARDIOLOGY CLINIC | Age: 85
End: 2025-07-15

## 2025-07-15 ENCOUNTER — HOSPITAL ENCOUNTER (OUTPATIENT)
Age: 85
Discharge: HOME | End: 2025-07-15
Payer: MEDICARE

## 2025-07-15 ENCOUNTER — OFFICE VISIT (OUTPATIENT)
Dept: CARDIOLOGY CLINIC | Age: 85
End: 2025-07-15
Payer: MEDICARE

## 2025-07-15 ENCOUNTER — HOSPITAL ENCOUNTER (OUTPATIENT)
Age: 85
Discharge: HOME | End: 2025-07-17
Payer: MEDICARE

## 2025-07-15 VITALS
OXYGEN SATURATION: 94 % | BODY MASS INDEX: 21.92 KG/M2 | DIASTOLIC BLOOD PRESSURE: 60 MMHG | HEIGHT: 64 IN | WEIGHT: 128.38 LBS | HEART RATE: 73 BPM | SYSTOLIC BLOOD PRESSURE: 122 MMHG

## 2025-07-15 DIAGNOSIS — I50.32 CHRONIC HEART FAILURE WITH PRESERVED EJECTION FRACTION (HCC): ICD-10-CM

## 2025-07-15 DIAGNOSIS — Z95.820 STATUS POST ANGIOPLASTY WITH STENT: ICD-10-CM

## 2025-07-15 DIAGNOSIS — Z95.2 S/P TAVR (TRANSCATHETER AORTIC VALVE REPLACEMENT): ICD-10-CM

## 2025-07-15 DIAGNOSIS — I35.0 SEVERE AORTIC STENOSIS: ICD-10-CM

## 2025-07-15 DIAGNOSIS — Z95.2 S/P TAVR (TRANSCATHETER AORTIC VALVE REPLACEMENT): Primary | ICD-10-CM

## 2025-07-15 DIAGNOSIS — R06.02 SHORTNESS OF BREATH: ICD-10-CM

## 2025-07-15 DIAGNOSIS — I48.0 PAROXYSMAL ATRIAL FIBRILLATION (HCC): ICD-10-CM

## 2025-07-15 LAB
ANION GAP SERPL CALC-SCNC: 14 MEQ/L (ref 8–16)
BUN SERPL-MCNC: 24 MG/DL (ref 8–23)
CALCIUM SERPL-MCNC: 10.1 MG/DL (ref 8.8–10.2)
CHLORIDE SERPL-SCNC: 96 MEQ/L (ref 98–111)
CO2 SERPL-SCNC: 25 MEQ/L (ref 22–29)
CREAT SERPL-MCNC: 1 MG/DL (ref 0.5–0.9)
ECHO AO ASC DIAM: 3 CM
ECHO AV AREA PEAK VELOCITY: 1.5 CM2
ECHO AV AREA VTI: 1.7 CM2
ECHO AV MEAN GRADIENT: 5 MMHG
ECHO AV MEAN VELOCITY: 1 M/S
ECHO AV PEAK GRADIENT: 9 MMHG
ECHO AV PEAK VELOCITY: 1.5 M/S
ECHO AV VELOCITY RATIO: 0.6
ECHO AV VTI: 27.9 CM
ECHO EST RA PRESSURE: 3 MMHG
ECHO LA AREA 2C: 17.6 CM2
ECHO LA AREA 4C: 16.7 CM2
ECHO LA DIAMETER: 4.2 CM
ECHO LA MAJOR AXIS: 5.2 CM
ECHO LA MINOR AXIS: 5.2 CM
ECHO LA VOL BP: 45 ML (ref 22–52)
ECHO LA VOL MOD A2C: 47 ML (ref 22–52)
ECHO LA VOL MOD A4C: 43 ML (ref 22–52)
ECHO LV E' LATERAL VELOCITY: 6.5 CM/S
ECHO LV E' SEPTAL VELOCITY: 4.5 CM/S
ECHO LV EF PHYSICIAN: 55 %
ECHO LV FRACTIONAL SHORTENING: 36 % (ref 28–44)
ECHO LV INTERNAL DIMENSION DIASTOLIC: 3.9 CM (ref 3.9–5.3)
ECHO LV INTERNAL DIMENSION SYSTOLIC: 2.5 CM
ECHO LV ISOVOLUMETRIC RELAXATION TIME (IVRT): 81 MS
ECHO LV IVSD: 1.1 CM (ref 0.6–0.9)
ECHO LV MASS 2D: 149.5 G (ref 67–162)
ECHO LV POSTERIOR WALL DIASTOLIC: 1.2 CM (ref 0.6–0.9)
ECHO LV RELATIVE WALL THICKNESS RATIO: 0.62
ECHO LVOT AREA: 2.5 CM2
ECHO LVOT AV VTI INDEX: 0.68
ECHO LVOT DIAM: 1.8 CM
ECHO LVOT MEAN GRADIENT: 2 MMHG
ECHO LVOT PEAK GRADIENT: 3 MMHG
ECHO LVOT PEAK VELOCITY: 0.9 M/S
ECHO LVOT SV: 48.6 ML
ECHO LVOT VTI: 19.1 CM
ECHO MV A VELOCITY: 1.14 M/S
ECHO MV E DECELERATION TIME (DT): 154 MS
ECHO MV E VELOCITY: 1.15 M/S
ECHO MV E/A RATIO: 1.01
ECHO MV E/E' LATERAL: 17.69
ECHO MV E/E' RATIO (AVERAGED): 21.62
ECHO MV E/E' SEPTAL: 25.56
ECHO MV REGURGITANT PEAK GRADIENT: 34 MMHG
ECHO MV REGURGITANT PEAK VELOCITY: 2.9 M/S
ECHO PULMONARY ARTERY END DIASTOLIC PRESSURE: 9 MMHG
ECHO PV MAX VELOCITY: 1 M/S
ECHO PV PEAK GRADIENT: 4 MMHG
ECHO PV REGURGITANT MAX VELOCITY: 1.5 M/S
ECHO RIGHT VENTRICULAR SYSTOLIC PRESSURE (RVSP): 32 MMHG
ECHO RV INTERNAL DIMENSION: 3.2 CM
ECHO RV TAPSE: 1.7 CM (ref 1.7–?)
ECHO TV E WAVE: 0.7 M/S
ECHO TV REGURGITANT MAX VELOCITY: 2.71 M/S
ECHO TV REGURGITANT PEAK GRADIENT: 29 MMHG
GFR SERPL CREATININE-BSD FRML MDRD: 55 ML/MIN/1.73M2
GLUCOSE SERPL-MCNC: 119 MG/DL (ref 74–109)
NT-PROBNP SERPL IA-MCNC: 1528 PG/ML (ref 0–449)
POTASSIUM SERPL-SCNC: 4.2 MEQ/L (ref 3.5–5.2)
SODIUM SERPL-SCNC: 135 MEQ/L (ref 135–145)

## 2025-07-15 PROCEDURE — 1123F ACP DISCUSS/DSCN MKR DOCD: CPT | Performed by: NURSE PRACTITIONER

## 2025-07-15 PROCEDURE — 99214 OFFICE O/P EST MOD 30 MIN: CPT | Performed by: NURSE PRACTITIONER

## 2025-07-15 PROCEDURE — 1159F MED LIST DOCD IN RCRD: CPT | Performed by: NURSE PRACTITIONER

## 2025-07-15 PROCEDURE — 93306 TTE W/DOPPLER COMPLETE: CPT

## 2025-07-15 PROCEDURE — 36415 COLL VENOUS BLD VENIPUNCTURE: CPT

## 2025-07-15 PROCEDURE — 83880 ASSAY OF NATRIURETIC PEPTIDE: CPT

## 2025-07-15 PROCEDURE — 93306 TTE W/DOPPLER COMPLETE: CPT | Performed by: INTERNAL MEDICINE

## 2025-07-15 PROCEDURE — 80048 BASIC METABOLIC PNL TOTAL CA: CPT

## 2025-07-15 PROCEDURE — 93000 ELECTROCARDIOGRAM COMPLETE: CPT | Performed by: NURSE PRACTITIONER

## 2025-07-15 RX ORDER — CLOPIDOGREL BISULFATE 75 MG/1
75 TABLET ORAL DAILY
Qty: 90 TABLET | Refills: 3 | Status: SHIPPED | OUTPATIENT
Start: 2025-07-15

## 2025-07-15 RX ORDER — MAGNESIUM 200 MG
200 TABLET ORAL DAILY
COMMUNITY

## 2025-07-15 RX ORDER — AMLODIPINE BESYLATE 2.5 MG/1
2.5 TABLET ORAL DAILY
Qty: 90 TABLET | Refills: 3 | Status: SHIPPED | OUTPATIENT
Start: 2025-07-15

## 2025-07-15 RX ORDER — DILTIAZEM HYDROCHLORIDE 120 MG/1
120 CAPSULE, COATED, EXTENDED RELEASE ORAL DAILY
Qty: 90 CAPSULE | Refills: 3 | Status: SHIPPED | OUTPATIENT
Start: 2025-07-15

## 2025-07-15 RX ORDER — LISINOPRIL 20 MG/1
20 TABLET ORAL DAILY
Qty: 90 TABLET | Refills: 3 | Status: SHIPPED | OUTPATIENT
Start: 2025-07-15

## 2025-07-15 RX ORDER — METOPROLOL SUCCINATE 25 MG/1
12.5 TABLET, EXTENDED RELEASE ORAL DAILY
Qty: 45 TABLET | Refills: 3 | Status: SHIPPED | OUTPATIENT
Start: 2025-07-15

## 2025-07-15 NOTE — PATIENT INSTRUCTIONS
Continue current medications as prescribed.    Stay as active as you can.   Walk some every day - start slow - like just 2 or 3 minutes and then build on that.   Use the exercise bands or light weights.     Eat heart healthy diet.     Follow-up with your PCP as scheduled.    Follow-up with Dr. Michael as scheduled or sooner if need.     Follow-up with XOCHILT Owen CNP in 8 weeks or sooner if need.  Will likely stop Colchicine at this visit and will review all medications.     You may receive a survey regarding the care you received during your visit.  Your input is valuable to us.  We encourage you to complete and return your survey.  We hope you will choose us in the future for your healthcare needs.    Your nurses today were Sabina Finn and Nguyen.  Office hours:   Mon-Thurs 8-4:30  Friday 8-12  Phone: [phone]    Continue:  Continue current medications  Daily weights and record  Fluid restriction of 2 Liters per day  Limit sodium in diet to around 4181-7272 mg/day  Monitor BP    Call the [facility] for any of the following symptoms:   Weight gain of 3 pounds in 1 day or 5 pounds in 1 week  Increased shortness of breath  Shortness of breath while laying down  Chest pain  Swelling in feet, ankles or legs  Bloating in abdomen  Fatigue     Take an extra one-half tablet of Lasix as needed:    Only take if weight up by 3 pounds overnight or more than 5 pounds in a week.   Keep a log and betty on the log the days you took the Lasix.  Make sure drinking adequately to the 2L fluid restriction.

## 2025-07-15 NOTE — PROGRESS NOTES
Pt here for 30 day post TAVR    EKG done today     Pt continues with swelling   
1.0.  - Chest x-ray revealed a small amount of fluid at the base of both lungs, expected to resolve with Bumex treatment.  - Start with short walks of 2 to 3 minutes without stopping, gradually increasing the duration over time.  - Arm exercises while seated to strengthen the heart.  - Leg exercises while seated to engage all muscles.  - Use of exercise bands or small weights encouraged, but caution advised against using heavy weights that could cause imbalance or falls.  - Monitor weight and take an extra half tablet of Lasix as needed if weight increases by more than 3 pounds overnight or more than 5 pounds in a week.  - Adequate water intake and a low-salt diet recommended.  - Avoid strenuous activities such as hedge trimming until able to walk for 10 minutes without stopping.  - Watchman device will be considered once strength is regained.  - Colchicine will be continued for another month to ensure complete resolution of pericarditis.    2. Atrial Fibrillation:  - Experienced atrial fibrillation with RVR post-TAVR.  - Amiodarone and beta-blockers were stopped due to the TAVR procedure.  - Toprol-XL 12.5 mg was restarted and placed on event monitor. Event monitor in process; no sx.    3. Pericarditis:  - Had pericarditis post-TAVR.  - Colchicine will be continued for another month to ensure complete resolution of pericarditis.    4. Cardiac Tamponade:  - Experienced cardiac tamponade post-TAVR.  - trivial pericardial effusion on follow-up exam    5. Fluid Retention:  - Reported fluid retention in the right foot, which has improved.  - Elevate legs and keep them moving.  - If weight increases by more than 3 pounds overnight or more than 5 pounds in a week, take an extra half tablet of Lasix.    6. Fatigue:  - Reported issues with fatigue post-TAVR.  - Start with short walks of 2 to 3 minutes without stopping, gradually increasing the duration over time.  - Arm exercises while seated to strengthen the heart.  - Leg

## 2025-07-20 LAB — ECHO BSA: 1.63 M2

## 2025-07-21 ENCOUNTER — TELEPHONE (OUTPATIENT)
Dept: CARDIOLOGY CLINIC | Age: 85
End: 2025-07-21

## 2025-07-21 NOTE — TELEPHONE ENCOUNTER
Pt called is not taking potassium , is pt to be taking in note from 7/15 to take daily, if pt is to restart pt c/o pill too large hard to swallow.    Jacquelyn advise?

## 2025-07-22 ENCOUNTER — RESULTS FOLLOW-UP (OUTPATIENT)
Dept: CARDIOLOGY CLINIC | Age: 85
End: 2025-07-22

## 2025-07-22 NOTE — TELEPHONE ENCOUNTER
Patient not willing to take effervescent tablets.  Says she has potassium citrate 10meq at home ordered previously by cardiologist Dr Craig and those she can take. Explained they are different potassiums.  Asking if she can just take those and if so what dose?    Claims she has not been taking ANY potassium for quite some time.

## 2025-07-22 NOTE — RESULT ENCOUNTER NOTE
Please let her know event monitor ok - HR was high post TAVR off her Metoprolol - has been well controlled since back on - no changes to meds. Thank you.

## 2025-07-23 NOTE — TELEPHONE ENCOUNTER
Spoke with Marlene-  discussed the findings of the heart Monitor   Patient verbalized understanding     Next apt 08.19.2025

## 2025-07-23 NOTE — TELEPHONE ENCOUNTER
----- Message from XOCHILT Lemons - CNP sent at 7/22/2025  5:29 PM EDT -----  Please let her know event monitor ok - HR was high post TAVR off her Metoprolol - has been well controlled since back on - no changes to meds. Thank you.

## 2025-07-29 ENCOUNTER — TELEPHONE (OUTPATIENT)
Dept: CARDIOLOGY CLINIC | Age: 85
End: 2025-07-29

## 2025-07-29 NOTE — TELEPHONE ENCOUNTER
Received fax from [facility].    Patient refusing to participate.    Next Appt is 08/19/25 with Jacquelyn.

## 2025-08-19 ENCOUNTER — OFFICE VISIT (OUTPATIENT)
Dept: CARDIOLOGY CLINIC | Age: 85
End: 2025-08-19
Payer: MEDICARE

## 2025-08-19 VITALS
BODY MASS INDEX: 21.56 KG/M2 | WEIGHT: 126.25 LBS | SYSTOLIC BLOOD PRESSURE: 130 MMHG | OXYGEN SATURATION: 97 % | HEIGHT: 64 IN | HEART RATE: 65 BPM | DIASTOLIC BLOOD PRESSURE: 76 MMHG

## 2025-08-19 DIAGNOSIS — I25.10 CORONARY ARTERY DISEASE INVOLVING NATIVE CORONARY ARTERY OF NATIVE HEART WITHOUT ANGINA PECTORIS: ICD-10-CM

## 2025-08-19 DIAGNOSIS — Z95.820 STATUS POST ANGIOPLASTY WITH STENT: ICD-10-CM

## 2025-08-19 DIAGNOSIS — I35.0 SEVERE AORTIC STENOSIS: ICD-10-CM

## 2025-08-19 DIAGNOSIS — Z95.2 S/P TAVR (TRANSCATHETER AORTIC VALVE REPLACEMENT): Primary | ICD-10-CM

## 2025-08-19 DIAGNOSIS — I77.72 ILIAC ARTERY DISSECTION: ICD-10-CM

## 2025-08-19 DIAGNOSIS — I48.0 PAROXYSMAL ATRIAL FIBRILLATION (HCC): ICD-10-CM

## 2025-08-19 DIAGNOSIS — I50.32 CHRONIC HEART FAILURE WITH PRESERVED EJECTION FRACTION (HCC): ICD-10-CM

## 2025-08-19 PROCEDURE — 99214 OFFICE O/P EST MOD 30 MIN: CPT | Performed by: NURSE PRACTITIONER

## 2025-08-19 PROCEDURE — 1123F ACP DISCUSS/DSCN MKR DOCD: CPT | Performed by: NURSE PRACTITIONER

## 2025-08-19 RX ORDER — METOPROLOL SUCCINATE 25 MG/1
50 TABLET, EXTENDED RELEASE ORAL DAILY
Qty: 90 TABLET | Refills: 3
Start: 2025-08-19

## 2025-08-19 RX ORDER — LISINOPRIL 10 MG/1
10 TABLET ORAL DAILY
Qty: 90 TABLET | Refills: 2
Start: 2025-08-19

## 2025-08-19 RX ORDER — FUROSEMIDE 40 MG/1
40 TABLET ORAL 2 TIMES DAILY
Qty: 90 TABLET | Refills: 3
Start: 2025-08-19

## (undated) DEVICE — KIT MFLD ISOLATN NACL CNTRST PRT TBNG SPIK W/ PRSS TRNSDUC

## (undated) DEVICE — GUIDE EXTENSION CATHETER: Brand: GUIDEZILLA™ II

## (undated) DEVICE — PINNACLE INTRODUCER SHEATH: Brand: PINNACLE

## (undated) DEVICE — PERCLOSE™ PROSTYLE™ SUTURE-MEDIATED CLOSURE AND REPAIR SYSTEM: Brand: PERCLOSE™ PROSTYLE™

## (undated) DEVICE — CATHETER PULM ART 5FR INFL 0.75CC L110CM BAL DIA8MM SGL WDG

## (undated) DEVICE — GOWN,SIRUS,NON REINFRCD,LARGE,SET IN SL: Brand: MEDLINE

## (undated) DEVICE — CATHETER ANGIO (ORDER IN MULTIPLES OF 5 EACHS) PIG INFINITI 5FR 110CM 0.038IN STRAIGHT

## (undated) DEVICE — GUIDEWIRE VASC L150CM DIA0.035IN FLX END L7CM J 3MM PTFE

## (undated) DEVICE — CATH BLLN ANGIO 3X15MM NC EUPHORIA RX

## (undated) DEVICE — DEVICE INFL 20ML 30ATM POLYCARB BRL ABS PLUNG DGT DISPLAY

## (undated) DEVICE — KELLY FORCEPS/STD STR STERILE: Brand: MEDLINE

## (undated) DEVICE — CATHETER DIAG 5FR L100CM LUMN ID0.047IN JL4 CRV 0 SIDE H

## (undated) DEVICE — CATHETER DIAG AD 6FR L110CM 145DEG COR GRN HYDRPHLC NYL

## (undated) DEVICE — PROVE COVER: Brand: UNBRANDED

## (undated) DEVICE — DRAPE KIT RAMAPR RADIATION SHIELD

## (undated) DEVICE — DEVICE CLSR 5FR BIOABSRB FULL INTEGR RAP HEMSTAS FOR FEM

## (undated) DEVICE — CATH BLLN ANGIO 3X8MM NC EUPHORIA RX

## (undated) DEVICE — GUIDEWIRE 25/260/FC/PTFE/3J: Brand: GUIDEWIRE

## (undated) DEVICE — TRUE™ DILATATION BALLOON VALVULOPLASTY CATHETER, 20 MM X 4.5 CM, 110 CM CATHETER: Brand: TRUE DILATATION

## (undated) DEVICE — ARMADA 35 PTA CATHETER 10 MM X 60 MM X 80 CM / OVER-THE-WIRE: Brand: ARMADA

## (undated) DEVICE — CATH BLLN ANGIO 3.50X12MM NC EUPHORIA RX

## (undated) DEVICE — PERICARDIOCENTESIS KIT HI FLO 0.035 IN 8.3 FRX40 CM PIG

## (undated) DEVICE — LOADING SYS L-EVOLUTFX-2329: Brand: EVOLUT™ FX

## (undated) DEVICE — DRAINAGE CATHETER SYSTEM: Brand: FLEXIMA™ APDL

## (undated) DEVICE — GUIDEWIRE VASC L75CM DIA0.035IN TIP L7CM PTFE COAT S STL

## (undated) DEVICE — RADIFOCUS GLIDEWIRE: Brand: GLIDEWIRE

## (undated) DEVICE — CATHETER GUID 6FR DIA0.071IN SHFT NYL STD L JR 4 CRV ENH

## (undated) DEVICE — CATHETER DIAG 5FR L100CM LUMN ID0.047IN JL3.5 CRV 0 SIDE H

## (undated) DEVICE — COTTON BALL ST

## (undated) DEVICE — GUIDEWIRE VASC L260CM DIA0.035IN RAD 3MM J TIP L7CM PTFE

## (undated) DEVICE — Device

## (undated) DEVICE — KIT ANGIO W/ AT P65 PREM HND CTRL FOR CNTRST DEL ANGIOTOUCH

## (undated) DEVICE — CATHETER COR DIAG PIGTAILS PIG 145 CRV 5FR 110CM 6 SIDE H

## (undated) DEVICE — GUIDEWIRE VASC 0.035 INX275 CM X SM CRV LUBRIGREEN SS SAFARI

## (undated) DEVICE — CATH BLLN ANGIO 2.50X12MM SC EUPHORA RX

## (undated) DEVICE — 4F (1.0MM ID) X 9CM STIFF4F (1.0 MICRO-STICK®INTRODUCER SE WITH NITINOL GUIDEWIREWITH NITIN WITH RADIOPAQUE TIPWITH RADIOPAQ: Brand: MICRO-STICK SETMICRO-STICK SET

## (undated) DEVICE — GLIDESHEATH SLENDER NITINOL HYDROPHILIC COATED INTRODUCER SHEATH: Brand: GLIDESHEATH SLENDER

## (undated) DEVICE — GUIDEWIRE VASC L150CM DIA0.035IN FLX TIP L7CM PTFE STR FIX

## (undated) DEVICE — SUTURE MCRYL SZ 4-0 L18IN ABSRB UD P-3 L13MM 3/8 CIR PRIM Y494G

## (undated) DEVICE — VALVE HEMSTAS W/ GWIRE INSRTN TOOL GRDIAN II NC

## (undated) DEVICE — CATHETER IVL C2PLUS SHOCKWAVE 3.0MM X 12MM

## (undated) DEVICE — SUTURE NONABSORBABLE BRAIDED 4-0 SH 30 IN BLK PERMA HND K831H

## (undated) DEVICE — CHECK-FLO PERFORMER INTRODUCER: Brand: PERFORMER

## (undated) DEVICE — DELIV SYS D-EVOLUTFX-2329: Brand: EVOLUT™ FX

## (undated) DEVICE — GOWN,SIRUS,NONRNF,SETINSLV,XL,20/CS: Brand: MEDLINE

## (undated) DEVICE — RUNTHROUGH NS EXTRA FLOPPY PTCA GUIDEWIRE: Brand: RUNTHROUGH

## (undated) DEVICE — COVER,TABLE,44X90,STERILE: Brand: MEDLINE

## (undated) DEVICE — SHEATH INTRO L12CM DIA6FR W/ LUERLOCK HUB HEMSTAS VLV

## (undated) DEVICE — KIT MICROINTRODUCER 4FR ECHOGENIC NDL L7CM 21GA COAX NIT

## (undated) DEVICE — KIT INTRO 5FR L7CM NDL 21GA L4CM 0018IN NIT COR PLAT TIP

## (undated) DEVICE — GUIDE CATHETER: Brand: MACH1™

## (undated) DEVICE — ELECTRODE PACE S STL BPLR BLLN TEMP CATH

## (undated) DEVICE — PACK PROCEDURE SURG PLAS SC MIN SRHP LF

## (undated) DEVICE — GLOVE ORANGE PI 7   MSG9070

## (undated) DEVICE — Device: Brand: MEDEX

## (undated) DEVICE — CATHETER ANGIO AMPLATZ LT 1 6 FRX100 CM INFIN

## (undated) DEVICE — BANDAGE,GAUZE,4.5"X4.1YD,STERILE,LF: Brand: MEDLINE

## (undated) DEVICE — CATHETER DIAG 5FR L100CM LUMN ID0.047IN JR4 CRV 0 SIDE H

## (undated) DEVICE — GLOVE SURG SZ 8 L11.77IN FNGR THK9.8MIL STRW LTX POLYMER

## (undated) DEVICE — TIBURON NEONATAL DRAPE: Brand: CONVERTORS

## (undated) DEVICE — HI-TORQUE SUPRA CORE .035 PERIPHERAL GUIDE WIRE .035 X 190 CM: Brand: HI-TORQUE SUPRA CORE

## (undated) DEVICE — FLEXOR, CHECK-FLO, INTRODUCER ANSEL MODIFICATION: Brand: FLEXOR